# Patient Record
Sex: FEMALE | Race: WHITE | NOT HISPANIC OR LATINO | Employment: OTHER | URBAN - NONMETROPOLITAN AREA
[De-identification: names, ages, dates, MRNs, and addresses within clinical notes are randomized per-mention and may not be internally consistent; named-entity substitution may affect disease eponyms.]

---

## 2023-02-01 PROBLEM — M25.552 PAIN IN LEFT HIP: Status: ACTIVE | Noted: 2023-02-01

## 2023-02-01 PROBLEM — I25.10 CAD (CORONARY ARTERY DISEASE): Status: ACTIVE | Noted: 2023-02-01

## 2023-02-01 PROBLEM — I49.5 SINUS NODE DYSFUNCTION (MULTI): Status: ACTIVE | Noted: 2023-02-01

## 2023-02-01 PROBLEM — R47.9 SPEECH DEFECT: Status: ACTIVE | Noted: 2023-02-01

## 2023-02-01 PROBLEM — I35.1 SEVERE AORTIC REGURGITATION: Status: ACTIVE | Noted: 2023-02-01

## 2023-02-01 PROBLEM — M54.50 CHRONIC LOW BACK PAIN: Status: ACTIVE | Noted: 2023-02-01

## 2023-02-01 PROBLEM — R09.81 NASAL CONGESTION: Status: ACTIVE | Noted: 2023-02-01

## 2023-02-01 PROBLEM — N18.30 STAGE 3 CHRONIC KIDNEY DISEASE (MULTI): Status: ACTIVE | Noted: 2023-02-01

## 2023-02-01 PROBLEM — R35.1 NOCTURIA: Status: ACTIVE | Noted: 2023-02-01

## 2023-02-01 PROBLEM — E55.9 MILD VITAMIN D DEFICIENCY: Status: ACTIVE | Noted: 2023-02-01

## 2023-02-01 PROBLEM — M51.36 LUMBAR DEGENERATIVE DISC DISEASE: Status: ACTIVE | Noted: 2023-02-01

## 2023-02-01 PROBLEM — J98.4 RESTRICTIVE LUNG DISEASE: Status: ACTIVE | Noted: 2023-02-01

## 2023-02-01 PROBLEM — E87.6 HYPOKALEMIA: Status: ACTIVE | Noted: 2023-02-01

## 2023-02-01 PROBLEM — I35.2 AORTIC INSUFFICIENCY WITH AORTIC STENOSIS: Status: ACTIVE | Noted: 2023-02-01

## 2023-02-01 PROBLEM — R39.9 URINARY SYMPTOM OR SIGN: Status: ACTIVE | Noted: 2023-02-01

## 2023-02-01 PROBLEM — F51.04 CHRONIC INSOMNIA: Status: ACTIVE | Noted: 2023-02-01

## 2023-02-01 PROBLEM — R29.6 MULTIPLE FALLS: Status: ACTIVE | Noted: 2023-02-01

## 2023-02-01 PROBLEM — I10 BENIGN ESSENTIAL HTN: Status: ACTIVE | Noted: 2023-02-01

## 2023-02-01 PROBLEM — R60.0 LEG EDEMA, LEFT: Status: ACTIVE | Noted: 2023-02-01

## 2023-02-01 PROBLEM — M16.12 PRIMARY OSTEOARTHRITIS OF LEFT HIP: Status: ACTIVE | Noted: 2023-02-01

## 2023-02-01 PROBLEM — J45.909 ASTHMA (HHS-HCC): Status: ACTIVE | Noted: 2023-02-01

## 2023-02-01 PROBLEM — M81.0 OSTEOPOROSIS: Status: ACTIVE | Noted: 2023-02-01

## 2023-02-01 PROBLEM — I50.42 CHRONIC COMBINED SYSTOLIC AND DIASTOLIC CONGESTIVE HEART FAILURE (MULTI): Status: ACTIVE | Noted: 2023-02-01

## 2023-02-01 PROBLEM — J32.9 CHRONIC SINUSITIS: Status: ACTIVE | Noted: 2023-02-01

## 2023-02-01 PROBLEM — D69.6 THROMBOCYTOPENIA (CMS-HCC): Status: ACTIVE | Noted: 2023-02-01

## 2023-02-01 PROBLEM — J30.9 ALLERGIC RHINITIS: Status: ACTIVE | Noted: 2023-02-01

## 2023-02-01 PROBLEM — J96.11 CHRONIC RESPIRATORY FAILURE WITH HYPOXIA (MULTI): Status: ACTIVE | Noted: 2023-02-01

## 2023-02-01 PROBLEM — R19.7 BLOODY DIARRHEA: Status: ACTIVE | Noted: 2023-02-01

## 2023-02-01 PROBLEM — M54.9 UPPER BACK PAIN: Status: ACTIVE | Noted: 2023-02-01

## 2023-02-01 PROBLEM — R05.9 COUGH: Status: ACTIVE | Noted: 2023-02-01

## 2023-02-01 PROBLEM — R53.1 WEAKNESS: Status: ACTIVE | Noted: 2023-02-01

## 2023-02-01 PROBLEM — R51.9 HEADACHE: Status: ACTIVE | Noted: 2023-02-01

## 2023-02-01 PROBLEM — K59.00 CONSTIPATION: Status: ACTIVE | Noted: 2023-02-01

## 2023-02-01 PROBLEM — R91.1 LUNG NODULE: Status: ACTIVE | Noted: 2023-02-01

## 2023-02-01 PROBLEM — G89.29 CHRONIC LOW BACK PAIN: Status: ACTIVE | Noted: 2023-02-01

## 2023-02-01 PROBLEM — G62.9 NEUROPATHY: Status: ACTIVE | Noted: 2023-02-01

## 2023-02-01 PROBLEM — M87.051 AVASCULAR NECROSIS OF RIGHT FEMORAL HEAD (MULTI): Status: ACTIVE | Noted: 2023-02-01

## 2023-02-01 PROBLEM — R06.02 SHORTNESS OF BREATH: Status: ACTIVE | Noted: 2023-02-01

## 2023-02-01 PROBLEM — Z95.0 S/P CARDIAC PACEMAKER PROCEDURE: Status: ACTIVE | Noted: 2023-02-01

## 2023-02-01 PROBLEM — N39.0 ACUTE UTI: Status: ACTIVE | Noted: 2023-02-01

## 2023-02-01 PROBLEM — M25.551 RIGHT HIP PAIN: Status: ACTIVE | Noted: 2023-02-01

## 2023-02-01 PROBLEM — Z95.828 H/O ASCENDING AORTIC REPLACEMENT: Status: ACTIVE | Noted: 2023-02-01

## 2023-02-01 PROBLEM — Z95.5 HISTORY OF CORONARY ARTERY STENT PLACEMENT: Status: ACTIVE | Noted: 2023-02-01

## 2023-02-01 PROBLEM — E78.5 DYSLIPIDEMIA: Status: ACTIVE | Noted: 2023-02-01

## 2023-02-01 PROBLEM — M43.17 SPONDYLOLISTHESIS OF LUMBOSACRAL REGION: Status: ACTIVE | Noted: 2023-02-01

## 2023-02-01 PROBLEM — D50.9 IRON DEFICIENCY ANEMIA: Status: ACTIVE | Noted: 2023-02-01

## 2023-02-01 PROBLEM — S82.209A TIBIA FRACTURE: Status: ACTIVE | Noted: 2023-02-01

## 2023-02-01 PROBLEM — R05.3 CHRONIC COUGH: Status: ACTIVE | Noted: 2023-02-01

## 2023-02-01 PROBLEM — I35.9 AORTIC VALVE DISORDER: Status: ACTIVE | Noted: 2023-02-01

## 2023-02-01 PROBLEM — K27.9 PUD (PEPTIC ULCER DISEASE): Status: ACTIVE | Noted: 2023-02-01

## 2023-02-01 PROBLEM — R06.2 WHEEZING: Status: ACTIVE | Noted: 2023-02-01

## 2023-02-01 PROBLEM — Z95.3 S/P AORTIC VALVE REPLACEMENT WITH BIOPROSTHETIC VALVE: Status: ACTIVE | Noted: 2023-02-01

## 2023-02-01 PROBLEM — E03.9 HYPOTHYROID: Status: ACTIVE | Noted: 2023-02-01

## 2023-02-01 PROBLEM — Z95.4 STATUS POST COMBINED AORTIC ROOT AND VALVE REPLACEMENT USING STENTLESS BIOPROSTHETIC AORTIC VALVE: Status: ACTIVE | Noted: 2023-02-01

## 2023-02-01 PROBLEM — M16.11 ARTHROPATHY OF RIGHT HIP: Status: ACTIVE | Noted: 2023-02-01

## 2023-02-01 PROBLEM — E87.8 ELECTROLYTE AND FLUID DISORDER: Status: ACTIVE | Noted: 2023-02-01

## 2023-02-01 PROBLEM — L03.90 CELLULITIS: Status: ACTIVE | Noted: 2023-02-01

## 2023-02-01 PROBLEM — M51.369 LUMBAR DEGENERATIVE DISC DISEASE: Status: ACTIVE | Noted: 2023-02-01

## 2023-02-01 PROBLEM — R68.89 ACTIVITY INTOLERANCE: Status: ACTIVE | Noted: 2023-02-01

## 2023-02-01 PROBLEM — M79.89 SWELLING OF LOWER EXTREMITY: Status: ACTIVE | Noted: 2023-02-01

## 2023-02-01 PROBLEM — Z95.2 HISTORY OF AORTIC VALVE REPLACEMENT: Status: ACTIVE | Noted: 2023-02-01

## 2023-02-01 PROBLEM — D64.9 ANEMIA: Status: ACTIVE | Noted: 2023-02-01

## 2023-02-01 PROBLEM — M48.061 LUMBAR CANAL STENOSIS: Status: ACTIVE | Noted: 2023-02-01

## 2023-02-01 PROBLEM — R53.83 FATIGUE: Status: ACTIVE | Noted: 2023-02-01

## 2023-02-01 PROBLEM — K20.90 ESOPHAGITIS: Status: ACTIVE | Noted: 2023-02-01

## 2023-02-01 PROBLEM — R31.9 HEMATURIA: Status: ACTIVE | Noted: 2023-02-01

## 2023-02-01 PROBLEM — I48.91 ATRIAL FIBRILLATION (MULTI): Status: ACTIVE | Noted: 2023-02-01

## 2023-02-01 PROBLEM — J84.9 ILD (INTERSTITIAL LUNG DISEASE) (MULTI): Status: ACTIVE | Noted: 2023-02-01

## 2023-02-01 PROBLEM — E83.42 HYPOMAGNESEMIA: Status: ACTIVE | Noted: 2023-02-01

## 2023-02-01 PROBLEM — M47.816 FACET DEGENERATION OF LUMBAR REGION: Status: ACTIVE | Noted: 2023-02-01

## 2023-02-01 PROBLEM — N63.0 BREAST LUMP: Status: ACTIVE | Noted: 2023-02-01

## 2023-02-01 PROBLEM — R23.3 EASY BRUISING: Status: ACTIVE | Noted: 2023-02-01

## 2023-02-01 RX ORDER — METOPROLOL SUCCINATE 25 MG/1
25 TABLET, EXTENDED RELEASE ORAL NIGHTLY
COMMUNITY
Start: 2020-05-21 | End: 2024-06-03

## 2023-02-01 RX ORDER — ALLOPURINOL 100 MG/1
100 TABLET ORAL DAILY
Status: ON HOLD | COMMUNITY
Start: 2022-12-19 | End: 2023-10-09 | Stop reason: ALTCHOICE

## 2023-02-01 RX ORDER — CLOPIDOGREL BISULFATE 75 MG/1
75 TABLET ORAL DAILY
COMMUNITY
Start: 2022-08-14 | End: 2023-10-31

## 2023-02-01 RX ORDER — LEVOTHYROXINE SODIUM 88 UG/1
88 TABLET ORAL
Status: ON HOLD | COMMUNITY
Start: 2021-07-26 | End: 2023-10-09 | Stop reason: ALTCHOICE

## 2023-02-01 RX ORDER — GABAPENTIN 300 MG/1
300 CAPSULE ORAL 2 TIMES DAILY
COMMUNITY
Start: 2022-08-14 | End: 2023-10-03

## 2023-02-01 RX ORDER — DOCUSATE SODIUM 100 MG/1
100 CAPSULE, LIQUID FILLED ORAL 2 TIMES DAILY PRN
Status: ON HOLD | COMMUNITY
Start: 2021-11-01 | End: 2023-10-09 | Stop reason: ALTCHOICE

## 2023-02-01 RX ORDER — LANOLIN ALCOHOL/MO/W.PET/CERES
400 CREAM (GRAM) TOPICAL DAILY
COMMUNITY
Start: 2022-12-12 | End: 2023-07-13

## 2023-02-01 RX ORDER — FERROUS SULFATE 325(65) MG
65 TABLET ORAL 2 TIMES DAILY
COMMUNITY
Start: 2022-05-17 | End: 2023-06-07

## 2023-02-01 RX ORDER — VALSARTAN 40 MG/1
40 TABLET ORAL DAILY
COMMUNITY
Start: 2022-06-14 | End: 2023-10-16

## 2023-02-01 RX ORDER — TORSEMIDE 20 MG/1
60 TABLET ORAL DAILY
Status: ON HOLD | COMMUNITY
Start: 2019-06-19 | End: 2023-10-09 | Stop reason: ALTCHOICE

## 2023-02-01 RX ORDER — ACETAMINOPHEN 500 MG
5 TABLET ORAL NIGHTLY
Status: ON HOLD | COMMUNITY
End: 2023-10-09 | Stop reason: ALTCHOICE

## 2023-02-01 RX ORDER — FLUTICASONE PROPIONATE 50 MCG
1 SPRAY, SUSPENSION (ML) NASAL 2 TIMES DAILY
Status: ON HOLD | COMMUNITY
End: 2023-10-09 | Stop reason: ALTCHOICE

## 2023-02-01 RX ORDER — PANTOPRAZOLE SODIUM 40 MG/1
40 TABLET, DELAYED RELEASE ORAL 2 TIMES DAILY
COMMUNITY
Start: 2022-05-17 | End: 2023-04-17

## 2023-02-01 RX ORDER — BISACODYL 5 MG
10 TABLET, DELAYED RELEASE (ENTERIC COATED) ORAL
Status: ON HOLD | COMMUNITY
Start: 2022-05-23 | End: 2023-10-09 | Stop reason: ALTCHOICE

## 2023-02-01 RX ORDER — DAPAGLIFLOZIN 10 MG/1
10 TABLET, FILM COATED ORAL DAILY
COMMUNITY
End: 2023-10-26 | Stop reason: ALTCHOICE

## 2023-02-01 RX ORDER — LIDOCAINE 50 MG/G
1 PATCH TOPICAL
Status: ON HOLD | COMMUNITY
Start: 2021-08-28 | End: 2023-10-09 | Stop reason: ALTCHOICE

## 2023-02-01 RX ORDER — SPIRONOLACTONE 25 MG/1
25 TABLET ORAL DAILY
Status: ON HOLD | COMMUNITY
Start: 2017-11-21 | End: 2023-10-09 | Stop reason: ALTCHOICE

## 2023-03-21 DIAGNOSIS — I48.91 ATRIAL FIBRILLATION, UNSPECIFIED TYPE (MULTI): Primary | ICD-10-CM

## 2023-03-22 RX ORDER — APIXABAN 2.5 MG/1
TABLET, FILM COATED ORAL
Qty: 60 TABLET | Refills: 6 | Status: SHIPPED | OUTPATIENT
Start: 2023-03-22 | End: 2023-11-13

## 2023-04-06 LAB
ALBUMIN (G/DL) IN SER/PLAS: 4.4 G/DL (ref 3.4–5)
ANION GAP IN SER/PLAS: 16 MMOL/L (ref 10–20)
CALCIUM (MG/DL) IN SER/PLAS: 9.7 MG/DL (ref 8.6–10.3)
CARBON DIOXIDE, TOTAL (MMOL/L) IN SER/PLAS: 26 MMOL/L (ref 21–32)
CHLORIDE (MMOL/L) IN SER/PLAS: 100 MMOL/L (ref 98–107)
CREATININE (MG/DL) IN SER/PLAS: 1.26 MG/DL (ref 0.5–1.05)
ERYTHROCYTE DISTRIBUTION WIDTH (RATIO) BY AUTOMATED COUNT: 14.5 % (ref 11.5–14.5)
ERYTHROCYTE MEAN CORPUSCULAR HEMOGLOBIN CONCENTRATION (G/DL) BY AUTOMATED: 33.6 G/DL (ref 32–36)
ERYTHROCYTE MEAN CORPUSCULAR VOLUME (FL) BY AUTOMATED COUNT: 94 FL (ref 80–100)
ERYTHROCYTES (10*6/UL) IN BLOOD BY AUTOMATED COUNT: 4.5 X10E12/L (ref 4–5.2)
GFR FEMALE: 43 ML/MIN/1.73M2
GLUCOSE (MG/DL) IN SER/PLAS: 72 MG/DL (ref 74–99)
HEMATOCRIT (%) IN BLOOD BY AUTOMATED COUNT: 42.5 % (ref 36–46)
HEMOGLOBIN (G/DL) IN BLOOD: 14.3 G/DL (ref 12–16)
LEUKOCYTES (10*3/UL) IN BLOOD BY AUTOMATED COUNT: 7.1 X10E9/L (ref 4.4–11.3)
PHOSPHATE (MG/DL) IN SER/PLAS: 3.6 MG/DL (ref 2.5–4.9)
PLATELETS (10*3/UL) IN BLOOD AUTOMATED COUNT: 164 X10E9/L (ref 150–450)
POTASSIUM (MMOL/L) IN SER/PLAS: 4.6 MMOL/L (ref 3.5–5.3)
SODIUM (MMOL/L) IN SER/PLAS: 137 MMOL/L (ref 136–145)
URATE (MG/DL) IN SER/PLAS: 6.1 MG/DL (ref 2.3–6.7)
UREA NITROGEN (MG/DL) IN SER/PLAS: 34 MG/DL (ref 6–23)

## 2023-04-16 DIAGNOSIS — K20.90 ESOPHAGITIS: Primary | ICD-10-CM

## 2023-04-17 RX ORDER — PANTOPRAZOLE SODIUM 40 MG/1
TABLET, DELAYED RELEASE ORAL
Qty: 180 TABLET | Refills: 1 | Status: SHIPPED | OUTPATIENT
Start: 2023-04-17 | End: 2023-10-24

## 2023-05-03 ENCOUNTER — TELEPHONE (OUTPATIENT)
Dept: PRIMARY CARE | Facility: CLINIC | Age: 83
End: 2023-05-03
Payer: MEDICARE

## 2023-05-03 NOTE — TELEPHONE ENCOUNTER
Son called stating that the passed couple of days, Shaina has been passing blood in her urine. She said that she does not want to go to the ER. Appt? Culture?

## 2023-05-04 ENCOUNTER — LAB (OUTPATIENT)
Dept: LAB | Facility: LAB | Age: 83
End: 2023-05-04
Payer: MEDICARE

## 2023-05-04 ENCOUNTER — OFFICE VISIT (OUTPATIENT)
Dept: PRIMARY CARE | Facility: CLINIC | Age: 83
End: 2023-05-04
Payer: MEDICARE

## 2023-05-04 VITALS
WEIGHT: 130.4 LBS | TEMPERATURE: 98.6 F | OXYGEN SATURATION: 94 % | SYSTOLIC BLOOD PRESSURE: 104 MMHG | HEIGHT: 60 IN | DIASTOLIC BLOOD PRESSURE: 62 MMHG | HEART RATE: 82 BPM | BODY MASS INDEX: 25.6 KG/M2

## 2023-05-04 DIAGNOSIS — I48.20 CHRONIC ATRIAL FIBRILLATION, UNSPECIFIED (MULTI): ICD-10-CM

## 2023-05-04 DIAGNOSIS — N39.0 ACUTE UTI: ICD-10-CM

## 2023-05-04 DIAGNOSIS — I50.42 CHRONIC COMBINED SYSTOLIC (CONGESTIVE) AND DIASTOLIC (CONGESTIVE) HEART FAILURE (MULTI): ICD-10-CM

## 2023-05-04 DIAGNOSIS — N18.30 STAGE 3 CHRONIC KIDNEY DISEASE, UNSPECIFIED WHETHER STAGE 3A OR 3B CKD (MULTI): ICD-10-CM

## 2023-05-04 DIAGNOSIS — D69.6 THROMBOCYTOPENIA (CMS-HCC): ICD-10-CM

## 2023-05-04 DIAGNOSIS — J96.11 CHRONIC RESPIRATORY FAILURE WITH HYPOXIA (MULTI): ICD-10-CM

## 2023-05-04 DIAGNOSIS — Z00.00 MEDICARE ANNUAL WELLNESS VISIT, SUBSEQUENT: Primary | ICD-10-CM

## 2023-05-04 DIAGNOSIS — R31.9 HEMATURIA, UNSPECIFIED TYPE: ICD-10-CM

## 2023-05-04 DIAGNOSIS — J84.10 PULMONARY FIBROSIS, UNSPECIFIED (MULTI): ICD-10-CM

## 2023-05-04 PROBLEM — I35.1 SEVERE AORTIC REGURGITATION: Status: RESOLVED | Noted: 2023-02-01 | Resolved: 2023-05-04

## 2023-05-04 PROBLEM — E87.8 ELECTROLYTE AND FLUID DISORDER: Status: RESOLVED | Noted: 2023-02-01 | Resolved: 2023-05-04

## 2023-05-04 PROBLEM — M54.9 UPPER BACK PAIN: Status: RESOLVED | Noted: 2023-02-01 | Resolved: 2023-05-04

## 2023-05-04 PROBLEM — Z95.828 H/O ASCENDING AORTIC REPLACEMENT: Status: RESOLVED | Noted: 2023-02-01 | Resolved: 2023-05-04

## 2023-05-04 PROBLEM — R51.9 HEADACHE: Status: RESOLVED | Noted: 2023-02-01 | Resolved: 2023-05-04

## 2023-05-04 PROBLEM — M25.552 PAIN IN LEFT HIP: Status: RESOLVED | Noted: 2023-02-01 | Resolved: 2023-05-04

## 2023-05-04 PROBLEM — M25.551 RIGHT HIP PAIN: Status: RESOLVED | Noted: 2023-02-01 | Resolved: 2023-05-04

## 2023-05-04 PROBLEM — R05.9 COUGH: Status: RESOLVED | Noted: 2023-02-01 | Resolved: 2023-05-04

## 2023-05-04 PROBLEM — R53.83 FATIGUE: Status: RESOLVED | Noted: 2023-02-01 | Resolved: 2023-05-04

## 2023-05-04 PROBLEM — M87.051 AVASCULAR NECROSIS OF RIGHT FEMORAL HEAD (MULTI): Status: RESOLVED | Noted: 2023-02-01 | Resolved: 2023-05-04

## 2023-05-04 PROBLEM — R47.9 SPEECH DEFECT: Status: RESOLVED | Noted: 2023-02-01 | Resolved: 2023-05-04

## 2023-05-04 PROBLEM — R60.0 LEG EDEMA, LEFT: Status: RESOLVED | Noted: 2023-02-01 | Resolved: 2023-05-04

## 2023-05-04 PROBLEM — I49.5 SINUS NODE DYSFUNCTION (MULTI): Status: RESOLVED | Noted: 2023-02-01 | Resolved: 2023-05-04

## 2023-05-04 PROBLEM — S82.209A TIBIA FRACTURE: Status: RESOLVED | Noted: 2023-02-01 | Resolved: 2023-05-04

## 2023-05-04 PROBLEM — E87.6 HYPOKALEMIA: Status: RESOLVED | Noted: 2023-02-01 | Resolved: 2023-05-04

## 2023-05-04 PROBLEM — R19.7 BLOODY DIARRHEA: Status: RESOLVED | Noted: 2023-02-01 | Resolved: 2023-05-04

## 2023-05-04 PROBLEM — R06.02 SHORTNESS OF BREATH: Status: RESOLVED | Noted: 2023-02-01 | Resolved: 2023-05-04

## 2023-05-04 PROBLEM — R09.81 NASAL CONGESTION: Status: RESOLVED | Noted: 2023-02-01 | Resolved: 2023-05-04

## 2023-05-04 PROBLEM — Z95.4 STATUS POST COMBINED AORTIC ROOT AND VALVE REPLACEMENT USING STENTLESS BIOPROSTHETIC AORTIC VALVE: Status: RESOLVED | Noted: 2023-02-01 | Resolved: 2023-05-04

## 2023-05-04 PROBLEM — Z95.2 STATUS POST COMBINED AORTIC ROOT AND VALVE REPLACEMENT USING STENTLESS BIOPROSTHETIC AORTIC VALVE: Status: RESOLVED | Noted: 2023-02-01 | Resolved: 2023-05-04

## 2023-05-04 PROBLEM — L03.90 CELLULITIS: Status: RESOLVED | Noted: 2023-02-01 | Resolved: 2023-05-04

## 2023-05-04 PROBLEM — R68.89 ACTIVITY INTOLERANCE: Status: RESOLVED | Noted: 2023-02-01 | Resolved: 2023-05-04

## 2023-05-04 PROBLEM — Z95.0 S/P CARDIAC PACEMAKER PROCEDURE: Status: RESOLVED | Noted: 2023-02-01 | Resolved: 2023-05-04

## 2023-05-04 PROBLEM — R53.1 WEAKNESS: Status: RESOLVED | Noted: 2023-02-01 | Resolved: 2023-05-04

## 2023-05-04 PROBLEM — R39.9 URINARY SYMPTOM OR SIGN: Status: RESOLVED | Noted: 2023-02-01 | Resolved: 2023-05-04

## 2023-05-04 PROBLEM — R06.2 WHEEZING: Status: RESOLVED | Noted: 2023-02-01 | Resolved: 2023-05-04

## 2023-05-04 PROBLEM — M79.89 SWELLING OF LOWER EXTREMITY: Status: RESOLVED | Noted: 2023-02-01 | Resolved: 2023-05-04

## 2023-05-04 PROBLEM — R35.1 NOCTURIA: Status: RESOLVED | Noted: 2023-02-01 | Resolved: 2023-05-04

## 2023-05-04 PROBLEM — R23.3 EASY BRUISING: Status: RESOLVED | Noted: 2023-02-01 | Resolved: 2023-05-04

## 2023-05-04 PROBLEM — K59.00 CONSTIPATION: Status: RESOLVED | Noted: 2023-02-01 | Resolved: 2023-05-04

## 2023-05-04 PROBLEM — N63.0 BREAST LUMP: Status: RESOLVED | Noted: 2023-02-01 | Resolved: 2023-05-04

## 2023-05-04 PROBLEM — R05.3 CHRONIC COUGH: Status: RESOLVED | Noted: 2023-02-01 | Resolved: 2023-05-04

## 2023-05-04 PROBLEM — D64.9 ANEMIA: Status: RESOLVED | Noted: 2023-02-01 | Resolved: 2023-05-04

## 2023-05-04 PROBLEM — Z95.5 S/P CORONARY ARTERY STENT PLACEMENT: Status: RESOLVED | Noted: 2023-02-01 | Resolved: 2023-05-04

## 2023-05-04 PROBLEM — E83.42 HYPOMAGNESEMIA: Status: RESOLVED | Noted: 2023-02-01 | Resolved: 2023-05-04

## 2023-05-04 LAB
ERYTHROCYTE DISTRIBUTION WIDTH (RATIO) BY AUTOMATED COUNT: 14.1 % (ref 11.5–14.5)
ERYTHROCYTE MEAN CORPUSCULAR HEMOGLOBIN CONCENTRATION (G/DL) BY AUTOMATED: 31.4 G/DL (ref 32–36)
ERYTHROCYTE MEAN CORPUSCULAR VOLUME (FL) BY AUTOMATED COUNT: 96 FL (ref 80–100)
ERYTHROCYTES (10*6/UL) IN BLOOD BY AUTOMATED COUNT: 4.07 X10E12/L (ref 4–5.2)
HEMATOCRIT (%) IN BLOOD BY AUTOMATED COUNT: 39.2 % (ref 36–46)
HEMOGLOBIN (G/DL) IN BLOOD: 12.3 G/DL (ref 12–16)
LEUKOCYTES (10*3/UL) IN BLOOD BY AUTOMATED COUNT: 5.2 X10E9/L (ref 4.4–11.3)
PLATELETS (10*3/UL) IN BLOOD AUTOMATED COUNT: 156 X10E9/L (ref 150–450)
POC APPEARANCE, URINE: ABNORMAL
POC BILIRUBIN, URINE: ABNORMAL
POC BLOOD, URINE: ABNORMAL
POC COLOR, URINE: ABNORMAL
POC GLUCOSE, URINE: NEGATIVE MG/DL
POC KETONES, URINE: ABNORMAL MG/DL
POC LEUKOCYTES, URINE: ABNORMAL
POC NITRITE,URINE: POSITIVE
POC PH, URINE: 6 PH
POC PROTEIN, URINE: ABNORMAL MG/DL
POC SPECIFIC GRAVITY, URINE: 1.02
POC UROBILINOGEN, URINE: 1 EU/DL

## 2023-05-04 PROCEDURE — 1160F RVW MEDS BY RX/DR IN RCRD: CPT | Performed by: FAMILY MEDICINE

## 2023-05-04 PROCEDURE — 81003 URINALYSIS AUTO W/O SCOPE: CPT | Performed by: FAMILY MEDICINE

## 2023-05-04 PROCEDURE — 1159F MED LIST DOCD IN RCRD: CPT | Performed by: FAMILY MEDICINE

## 2023-05-04 PROCEDURE — 1157F ADVNC CARE PLAN IN RCRD: CPT | Performed by: FAMILY MEDICINE

## 2023-05-04 PROCEDURE — 3078F DIAST BP <80 MM HG: CPT | Performed by: FAMILY MEDICINE

## 2023-05-04 PROCEDURE — 87077 CULTURE AEROBIC IDENTIFY: CPT

## 2023-05-04 PROCEDURE — 3074F SYST BP LT 130 MM HG: CPT | Performed by: FAMILY MEDICINE

## 2023-05-04 PROCEDURE — 1036F TOBACCO NON-USER: CPT | Performed by: FAMILY MEDICINE

## 2023-05-04 PROCEDURE — 1170F FXNL STATUS ASSESSED: CPT | Performed by: FAMILY MEDICINE

## 2023-05-04 PROCEDURE — 36415 COLL VENOUS BLD VENIPUNCTURE: CPT

## 2023-05-04 PROCEDURE — 85027 COMPLETE CBC AUTOMATED: CPT

## 2023-05-04 PROCEDURE — G0439 PPPS, SUBSEQ VISIT: HCPCS | Performed by: FAMILY MEDICINE

## 2023-05-04 PROCEDURE — 87186 SC STD MICRODIL/AGAR DIL: CPT

## 2023-05-04 PROCEDURE — 87086 URINE CULTURE/COLONY COUNT: CPT

## 2023-05-04 RX ORDER — CEPHALEXIN 500 MG/1
500 CAPSULE ORAL 2 TIMES DAILY
Qty: 14 CAPSULE | Refills: 0 | Status: SHIPPED | OUTPATIENT
Start: 2023-05-04 | End: 2023-05-11

## 2023-05-04 ASSESSMENT — PATIENT HEALTH QUESTIONNAIRE - PHQ9
SUM OF ALL RESPONSES TO PHQ9 QUESTIONS 1 AND 2: 0
1. LITTLE INTEREST OR PLEASURE IN DOING THINGS: NOT AT ALL
2. FEELING DOWN, DEPRESSED OR HOPELESS: NOT AT ALL

## 2023-05-04 ASSESSMENT — ACTIVITIES OF DAILY LIVING (ADL)
BATHING: INDEPENDENT
GROCERY_SHOPPING: INDEPENDENT
DRESSING: INDEPENDENT
DOING_HOUSEWORK: INDEPENDENT
TAKING_MEDICATION: INDEPENDENT
MANAGING_FINANCES: INDEPENDENT

## 2023-05-04 NOTE — PROGRESS NOTES
Subjective   Reason for Visit: Shaina Iverson is an 82 y.o. female here for a Medicare Wellness visit.     Past Medical, Surgical, and Family History reviewed and updated in chart.    Reviewed all medications by prescribing practitioner or clinical pharmacist (such as prescriptions, OTCs, herbal therapies and supplements) and documented in the medical record.    HPI    Here for medicare annual physical, also has been having hematuria x 3 days, along with clots. No dysuria or urinary urgency. No pelvic pain. Does have spotting on her underwear. S/p hysterectomy  Is on eliquis.  Has CHF and A-fib, following with cardiology, denies any chest pain, sob.  Has stage 3 CKD, following with renal, has ultrasound kidney scheduled  Had thrombocytopenia on recent blood work  Following with pulmonology for pulmonary fibrosis, on 2LO2 with exertion    Patient Care Team:  Steven Hdz MD as PCP - General  Steven Hdz MD as PCP - [employer] PCP     Review of Systems  General: no fever  Eyes: no blurry vision  ENT: no sore throat, no ear pain  Resp: no cough, sob or wheezing  Cardio: no chest pain, no palpitations  Abd: no nausea/vomiting  : see HPI      Objective   Vitals:  /62   Pulse 82   Temp 37 °C (98.6 °F)   Ht 1.524 m (5')   Wt 59.1 kg (130 lb 6.4 oz)   SpO2 94%   BMI 25.47 kg/m²       Physical Exam  Gen: NAD, alert  Head: normocephalic/atraumatic  Eyes: conjunctivae normal  Ears: canals clear bilaterally, TM normal   Nose: Deferred due to covid precautions, wearing mask  Oropharynx: Deferred due to covid precautions, wearing mask  Resp: Clear to auscultation  CVS: Regular rate and rhythm  Abdomen: soft, NT, ND  Ext: no edema, NT of lower extremities      Assessment/Plan   Problem List Items Addressed This Visit       Acute UTI    Relevant Medications    cephalexin (Keflex) 500 mg capsule    Chronic respiratory failure with hypoxia (CMS/HCC)    Hematuria    Relevant  Orders    POCT UA Automated manually resulted (Completed)    CBC (Completed)    Referral to Urology    Urine Culture (Completed)    US bladder    Stage 3 chronic kidney disease (CMS/HCC)    Thrombocytopenia (CMS/HCC)  Check CBC     Other Visit Diagnoses       Medicare annual wellness visit, subsequent    -  Primary    Chronic combined systolic (congestive) and diastolic (congestive) heart failure (CMS/HCC)        Chronic atrial fibrillation, unspecified (CMS/HCC)      Continue follow up with cardiology    Pulmonary fibrosis, unspecified (CMS/HCC)      Continue follow up with pulmonology

## 2023-05-07 LAB — URINE CULTURE: ABNORMAL

## 2023-06-05 LAB
BACTERIA, URINE: ABNORMAL /HPF
RBC, URINE: ABNORMAL /HPF (ref 0–5)
WBC CLUMPS, URINE: ABNORMAL /HPF
WBC, URINE: 47 /HPF (ref 0–5)

## 2023-06-06 ENCOUNTER — TELEPHONE (OUTPATIENT)
Dept: PRIMARY CARE | Facility: CLINIC | Age: 83
End: 2023-06-06
Payer: MEDICARE

## 2023-06-06 DIAGNOSIS — D50.0 IRON DEFICIENCY ANEMIA DUE TO CHRONIC BLOOD LOSS: ICD-10-CM

## 2023-06-06 NOTE — TELEPHONE ENCOUNTER
Went to see Dr. Miner, urology.  He wants her to stop Plavix Friday and stop Eliquis on Tuesday for procedure to remove cyst on bladder on the 16th.  Yi Pugh is out of the office, will you please advise on how to proceed? Javi [phone]

## 2023-06-07 RX ORDER — FERROUS SULFATE 325(65) MG
1 TABLET ORAL 2 TIMES DAILY
Qty: 180 TABLET | Refills: 1 | Status: SHIPPED | OUTPATIENT
Start: 2023-06-07 | End: 2024-02-06

## 2023-06-09 LAB — URINE CULTURE: ABNORMAL

## 2023-06-14 LAB
ACTIVATED PARTIAL THROMBOPLASTIN TIME IN PPP BY COAGULATION ASSAY: 33 SEC (ref 26–39)
ANION GAP IN SER/PLAS: 13 MMOL/L (ref 10–20)
APPEARANCE, URINE: ABNORMAL
BACTERIA, URINE: ABNORMAL /HPF
BASOPHILS (10*3/UL) IN BLOOD BY AUTOMATED COUNT: 0.03 X10E9/L (ref 0–0.1)
BASOPHILS/100 LEUKOCYTES IN BLOOD BY AUTOMATED COUNT: 0.6 % (ref 0–2)
BILIRUBIN, URINE: NEGATIVE
BLOOD, URINE: ABNORMAL
CALCIUM (MG/DL) IN SER/PLAS: 9.8 MG/DL (ref 8.6–10.3)
CARBON DIOXIDE, TOTAL (MMOL/L) IN SER/PLAS: 27 MMOL/L (ref 21–32)
CHLORIDE (MMOL/L) IN SER/PLAS: 102 MMOL/L (ref 98–107)
COLOR, URINE: YELLOW
CREATININE (MG/DL) IN SER/PLAS: 1.12 MG/DL (ref 0.5–1.05)
EOSINOPHILS (10*3/UL) IN BLOOD BY AUTOMATED COUNT: 0.27 X10E9/L (ref 0–0.4)
EOSINOPHILS/100 LEUKOCYTES IN BLOOD BY AUTOMATED COUNT: 5.3 % (ref 0–6)
ERYTHROCYTE DISTRIBUTION WIDTH (RATIO) BY AUTOMATED COUNT: 12.7 % (ref 11.5–14.5)
ERYTHROCYTE MEAN CORPUSCULAR HEMOGLOBIN CONCENTRATION (G/DL) BY AUTOMATED: 31.3 G/DL (ref 32–36)
ERYTHROCYTE MEAN CORPUSCULAR VOLUME (FL) BY AUTOMATED COUNT: 99 FL (ref 80–100)
ERYTHROCYTES (10*6/UL) IN BLOOD BY AUTOMATED COUNT: 4.36 X10E12/L (ref 4–5.2)
GFR FEMALE: 50 ML/MIN/1.73M2
GLUCOSE (MG/DL) IN SER/PLAS: 81 MG/DL (ref 74–99)
GLUCOSE, URINE: NEGATIVE MG/DL
HEMATOCRIT (%) IN BLOOD BY AUTOMATED COUNT: 43.2 % (ref 36–46)
HEMOGLOBIN (G/DL) IN BLOOD: 13.5 G/DL (ref 12–16)
IMMATURE GRANULOCYTES/100 LEUKOCYTES IN BLOOD BY AUTOMATED COUNT: 0.4 % (ref 0–0.9)
INR IN PPP BY COAGULATION ASSAY: 1.2 (ref 0.9–1.1)
KETONES, URINE: NEGATIVE MG/DL
LEUKOCYTE ESTERASE, URINE: ABNORMAL
LEUKOCYTES (10*3/UL) IN BLOOD BY AUTOMATED COUNT: 5.1 X10E9/L (ref 4.4–11.3)
LYMPHOCYTES (10*3/UL) IN BLOOD BY AUTOMATED COUNT: 1.23 X10E9/L (ref 0.8–3)
LYMPHOCYTES/100 LEUKOCYTES IN BLOOD BY AUTOMATED COUNT: 24 % (ref 13–44)
MONOCYTES (10*3/UL) IN BLOOD BY AUTOMATED COUNT: 0.49 X10E9/L (ref 0.05–0.8)
MONOCYTES/100 LEUKOCYTES IN BLOOD BY AUTOMATED COUNT: 9.6 % (ref 2–10)
NEUTROPHILS (10*3/UL) IN BLOOD BY AUTOMATED COUNT: 3.09 X10E9/L (ref 1.6–5.5)
NEUTROPHILS/100 LEUKOCYTES IN BLOOD BY AUTOMATED COUNT: 60.1 % (ref 40–80)
NITRITE, URINE: POSITIVE
PH, URINE: 7 (ref 5–8)
PLATELETS (10*3/UL) IN BLOOD AUTOMATED COUNT: 171 X10E9/L (ref 150–450)
POTASSIUM (MMOL/L) IN SER/PLAS: 4.1 MMOL/L (ref 3.5–5.3)
PROTEIN, URINE: NEGATIVE MG/DL
PROTHROMBIN TIME (PT) IN PPP BY COAGULATION ASSAY: 13.8 SEC (ref 9.8–13.4)
RBC, URINE: 1 /HPF (ref 0–5)
SODIUM (MMOL/L) IN SER/PLAS: 138 MMOL/L (ref 136–145)
SPECIFIC GRAVITY, URINE: 1 (ref 1–1.03)
UREA NITROGEN (MG/DL) IN SER/PLAS: 36 MG/DL (ref 6–23)
UROBILINOGEN, URINE: <2 MG/DL (ref 0–1.9)
WBC CLUMPS, URINE: ABNORMAL /HPF
WBC, URINE: 126 /HPF (ref 0–5)

## 2023-06-16 ENCOUNTER — HOSPITAL ENCOUNTER (OUTPATIENT)
Dept: DATA CONVERSION | Facility: HOSPITAL | Age: 83
End: 2023-06-16
Attending: STUDENT IN AN ORGANIZED HEALTH CARE EDUCATION/TRAINING PROGRAM | Admitting: STUDENT IN AN ORGANIZED HEALTH CARE EDUCATION/TRAINING PROGRAM
Payer: MEDICARE

## 2023-06-16 DIAGNOSIS — J45.909 UNSPECIFIED ASTHMA, UNCOMPLICATED (HHS-HCC): ICD-10-CM

## 2023-06-16 DIAGNOSIS — I13.0 HYPERTENSIVE HEART AND CHRONIC KIDNEY DISEASE WITH HEART FAILURE AND STAGE 1 THROUGH STAGE 4 CHRONIC KIDNEY DISEASE, OR UNSPECIFIED CHRONIC KIDNEY DISEASE (MULTI): ICD-10-CM

## 2023-06-16 DIAGNOSIS — I50.42 CHRONIC COMBINED SYSTOLIC (CONGESTIVE) AND DIASTOLIC (CONGESTIVE) HEART FAILURE (MULTI): ICD-10-CM

## 2023-06-16 DIAGNOSIS — I48.20 CHRONIC ATRIAL FIBRILLATION, UNSPECIFIED (MULTI): ICD-10-CM

## 2023-06-16 DIAGNOSIS — E03.9 HYPOTHYROIDISM, UNSPECIFIED: ICD-10-CM

## 2023-06-16 DIAGNOSIS — E78.5 HYPERLIPIDEMIA, UNSPECIFIED: ICD-10-CM

## 2023-06-16 DIAGNOSIS — I25.10 ATHEROSCLEROTIC HEART DISEASE OF NATIVE CORONARY ARTERY WITHOUT ANGINA PECTORIS: ICD-10-CM

## 2023-06-16 DIAGNOSIS — Z79.82 LONG TERM (CURRENT) USE OF ASPIRIN: ICD-10-CM

## 2023-06-16 DIAGNOSIS — N30.01 ACUTE CYSTITIS WITH HEMATURIA: ICD-10-CM

## 2023-06-16 DIAGNOSIS — Z95.5 PRESENCE OF CORONARY ANGIOPLASTY IMPLANT AND GRAFT: ICD-10-CM

## 2023-06-16 DIAGNOSIS — N18.30 CHRONIC KIDNEY DISEASE, STAGE 3 UNSPECIFIED (MULTI): ICD-10-CM

## 2023-06-16 DIAGNOSIS — N17.9 ACUTE KIDNEY FAILURE, UNSPECIFIED (CMS-HCC): ICD-10-CM

## 2023-06-16 DIAGNOSIS — Z79.01 LONG TERM (CURRENT) USE OF ANTICOAGULANTS: ICD-10-CM

## 2023-06-16 DIAGNOSIS — N32.89 OTHER SPECIFIED DISORDERS OF BLADDER: ICD-10-CM

## 2023-06-16 DIAGNOSIS — N30.21 OTHER CHRONIC CYSTITIS WITH HEMATURIA: ICD-10-CM

## 2023-06-16 DIAGNOSIS — Z95.810 PRESENCE OF AUTOMATIC (IMPLANTABLE) CARDIAC DEFIBRILLATOR: ICD-10-CM

## 2023-06-16 DIAGNOSIS — D64.9 ANEMIA, UNSPECIFIED: ICD-10-CM

## 2023-06-29 LAB
COMPLETE PATHOLOGY REPORT: NORMAL
CONVERTED CLINICAL DIAGNOSIS-HISTORY: NORMAL
CONVERTED FINAL DIAGNOSIS: NORMAL
CONVERTED FINAL REPORT PDF LINK TO COPY AND PASTE: NORMAL
CONVERTED GROSS DESCRIPTION: NORMAL

## 2023-07-06 LAB
ALANINE AMINOTRANSFERASE (SGPT) (U/L) IN SER/PLAS: 5 U/L (ref 7–45)
ALBUMIN (G/DL) IN SER/PLAS: 3.8 G/DL (ref 3.4–5)
ALKALINE PHOSPHATASE (U/L) IN SER/PLAS: 138 U/L (ref 33–136)
ANION GAP IN SER/PLAS: 16 MMOL/L (ref 10–20)
ASPARTATE AMINOTRANSFERASE (SGOT) (U/L) IN SER/PLAS: 14 U/L (ref 9–39)
BACTERIA, URINE: ABNORMAL /HPF
BILIRUBIN TOTAL (MG/DL) IN SER/PLAS: 0.3 MG/DL (ref 0–1.2)
CALCIUM (MG/DL) IN SER/PLAS: 9.3 MG/DL (ref 8.6–10.6)
CARBON DIOXIDE, TOTAL (MMOL/L) IN SER/PLAS: 24 MMOL/L (ref 21–32)
CHLORIDE (MMOL/L) IN SER/PLAS: 106 MMOL/L (ref 98–107)
CREATININE (MG/DL) IN SER/PLAS: 1.88 MG/DL (ref 0.5–1.05)
ERYTHROCYTE DISTRIBUTION WIDTH (RATIO) BY AUTOMATED COUNT: 12.8 % (ref 11.5–14.5)
ERYTHROCYTE MEAN CORPUSCULAR HEMOGLOBIN CONCENTRATION (G/DL) BY AUTOMATED: 31.5 G/DL (ref 32–36)
ERYTHROCYTE MEAN CORPUSCULAR VOLUME (FL) BY AUTOMATED COUNT: 96 FL (ref 80–100)
ERYTHROCYTES (10*6/UL) IN BLOOD BY AUTOMATED COUNT: 2.7 X10E12/L (ref 4–5.2)
GFR FEMALE: 27 ML/MIN/1.73M2
GLUCOSE (MG/DL) IN SER/PLAS: 81 MG/DL (ref 74–99)
HEMATOCRIT (%) IN BLOOD BY AUTOMATED COUNT: 26 % (ref 36–46)
HEMOGLOBIN (G/DL) IN BLOOD: 8.2 G/DL (ref 12–16)
LEUKOCYTES (10*3/UL) IN BLOOD BY AUTOMATED COUNT: 4.3 X10E9/L (ref 4.4–11.3)
NRBC (PER 100 WBCS) BY AUTOMATED COUNT: 0 /100 WBC (ref 0–0)
PLATELETS (10*3/UL) IN BLOOD AUTOMATED COUNT: 177 X10E9/L (ref 150–450)
POTASSIUM (MMOL/L) IN SER/PLAS: 4.5 MMOL/L (ref 3.5–5.3)
PROTEIN TOTAL: 6 G/DL (ref 6.4–8.2)
RBC, URINE: 689 /HPF (ref 0–5)
SODIUM (MMOL/L) IN SER/PLAS: 141 MMOL/L (ref 136–145)
SQUAMOUS EPITHELIAL CELLS, URINE: 1 /HPF
UREA NITROGEN (MG/DL) IN SER/PLAS: 44 MG/DL (ref 6–23)
WBC CLUMPS, URINE: ABNORMAL /HPF
WBC, URINE: 885 /HPF (ref 0–5)

## 2023-07-08 LAB — URINE CULTURE: ABNORMAL

## 2023-07-13 DIAGNOSIS — D50.9 IRON DEFICIENCY ANEMIA, UNSPECIFIED IRON DEFICIENCY ANEMIA TYPE: ICD-10-CM

## 2023-07-13 RX ORDER — LANOLIN ALCOHOL/MO/W.PET/CERES
CREAM (GRAM) TOPICAL
Qty: 30 TABLET | Refills: 2 | Status: SHIPPED | OUTPATIENT
Start: 2023-07-13 | End: 2023-10-31

## 2023-07-24 LAB
ERYTHROCYTE DISTRIBUTION WIDTH (RATIO) BY AUTOMATED COUNT: 15 % (ref 11.5–14.5)
ERYTHROCYTE MEAN CORPUSCULAR HEMOGLOBIN CONCENTRATION (G/DL) BY AUTOMATED: 30 G/DL (ref 32–36)
ERYTHROCYTE MEAN CORPUSCULAR VOLUME (FL) BY AUTOMATED COUNT: 100 FL (ref 80–100)
ERYTHROCYTES (10*6/UL) IN BLOOD BY AUTOMATED COUNT: 3.26 X10E12/L (ref 4–5.2)
HEMATOCRIT (%) IN BLOOD BY AUTOMATED COUNT: 32.7 % (ref 36–46)
HEMOGLOBIN (G/DL) IN BLOOD: 9.8 G/DL (ref 12–16)
LEUKOCYTES (10*3/UL) IN BLOOD BY AUTOMATED COUNT: 3.7 X10E9/L (ref 4.4–11.3)
NRBC (PER 100 WBCS) BY AUTOMATED COUNT: 0 /100 WBC (ref 0–0)
PLATELETS (10*3/UL) IN BLOOD AUTOMATED COUNT: 162 X10E9/L (ref 150–450)

## 2023-07-27 LAB — URINE CULTURE: ABNORMAL

## 2023-09-07 VITALS — BODY MASS INDEX: 23.83 KG/M2 | WEIGHT: 134.48 LBS | HEIGHT: 63 IN

## 2023-09-08 PROBLEM — I25.10 CORONARY ARTERIOSCLEROSIS: Status: ACTIVE | Noted: 2023-09-08

## 2023-09-08 PROBLEM — Z79.899 MEDICATION MANAGEMENT: Status: ACTIVE | Noted: 2023-09-08

## 2023-09-08 PROBLEM — I10 ESSENTIAL HYPERTENSION: Status: ACTIVE | Noted: 2023-09-08

## 2023-09-08 PROBLEM — R31.0 INTERMITTENT GROSS HEMATURIA: Status: ACTIVE | Noted: 2023-09-08

## 2023-09-08 PROBLEM — Z95.5 HISTORY OF CORONARY ARTERY STENT PLACEMENT: Status: ACTIVE | Noted: 2023-09-08

## 2023-09-08 PROBLEM — E11.9 DIABETES MELLITUS (MULTI): Status: ACTIVE | Noted: 2023-09-08

## 2023-09-08 PROBLEM — Z96.641 STATUS POST RIGHT HIP REPLACEMENT: Status: ACTIVE | Noted: 2023-09-08

## 2023-09-08 PROBLEM — M48.07 NEUROFORAMINAL STENOSIS OF LUMBOSACRAL SPINE: Status: ACTIVE | Noted: 2023-09-08

## 2023-09-08 PROBLEM — Z96.649 HISTORY OF TOTAL HIP REPLACEMENT: Status: ACTIVE | Noted: 2023-09-08

## 2023-09-08 PROBLEM — Z95.3 S/P AORTIC VALVE REPLACEMENT WITH BIOPROSTHETIC VALVE: Status: ACTIVE | Noted: 2023-09-08

## 2023-09-08 PROBLEM — I50.9 HEART FAILURE (MULTI): Status: ACTIVE | Noted: 2023-09-08

## 2023-09-08 PROBLEM — J44.9 CHRONIC OBSTRUCTIVE LUNG DISEASE (MULTI): Status: ACTIVE | Noted: 2023-09-08

## 2023-09-08 PROBLEM — Z87.19 HISTORY OF GI BLEED: Status: ACTIVE | Noted: 2023-09-08

## 2023-09-08 PROBLEM — R31.0 GROSS HEMATURIA: Status: ACTIVE | Noted: 2023-09-08

## 2023-09-08 PROBLEM — N18.9 CHRONIC KIDNEY DISEASE: Status: ACTIVE | Noted: 2023-09-08

## 2023-09-08 PROBLEM — Z95.2 S/P AORTIC VALVE REPLACEMENT: Status: ACTIVE | Noted: 2023-09-08

## 2023-09-08 RX ORDER — TORSEMIDE 20 MG/1
2 TABLET ORAL DAILY
Status: ON HOLD | COMMUNITY
Start: 2019-06-19 | End: 2023-10-09 | Stop reason: ALTCHOICE

## 2023-09-08 RX ORDER — ASPIRIN 81 MG/1
1 TABLET ORAL DAILY
Status: ON HOLD | COMMUNITY
End: 2023-10-09 | Stop reason: ALTCHOICE

## 2023-09-08 RX ORDER — BUTALBITAL, ACETAMINOPHEN AND CAFFEINE 50; 325; 40 MG/1; MG/1; MG/1
1 TABLET ORAL 3 TIMES DAILY PRN
Status: ON HOLD | COMMUNITY
Start: 2023-07-08 | End: 2023-10-09 | Stop reason: ALTCHOICE

## 2023-09-30 NOTE — H&P
History & Physical Reviewed:   I have reviewed the History and Physical dated:  05-Jun-2023   History and Physical reviewed and relevant findings noted. Patient examined to review pertinent physical  findings.: Significant findings noted below   Findings: Patient has cardiac clearance by Yi Pugh with 's office. EKG from 6/14 reviewed and seen by anesthesia. Showing atrial flutter but this is not new. Stopped Plavix 6/9. Stopped Eliquis 6/13. She also took her morning dose of nitrofurantoin.   Home Medications Reviewed: no changes noted   Allergies Reviewed: no changes noted       ERAS (Enhanced Recovery After Surgery):  ·  ERAS Patient: no     Consent:   COVID-19 Consent:  ·  COVID-19 Risk Consent Surgeon has reviewed key risks related to the risk of pro COVID-19 and if they contract COVID-19 what the risks are.       Electronic Signatures:  Radha Castellanos (PAC)   (Signed 16-Jun-2023 09:49)   Authored: History & Physical Reviewed, ERAS, Consent, Note Completion  Greta Miner)   (Signed 17-Jun-2023 08:07)   Co-Signer: History & Physical Reviewed, ERAS, Consent, Note Completion    Last Updated: 17-Jun-2023 08:07 by Greta Miner)

## 2023-10-02 NOTE — OP NOTE
PROCEDURE DETAILS    Preoperative Diagnosis:  Bladder mass  Postoperative Diagnosis:  Hemorrhagic cystitis  Suspicious bladder tumor  Surgeon: Greta Miner  Resident/Fellow/Other Assistant: None of these were associated with this case    Procedure:  1. Bladder Resection Transurethral (TURBT)  2. Fulguration of bleeder    Anesthesia: No anesthesiologist associated with this case  Estimated Blood Loss: 10 cc  Findings: Several areas of spontaneous mucosal cracks and bleeding  Suspicious 1cm area of urothelial atypia on left anterolateral wall  No mass  Specimens(s) Collected: yes,  bladder lesion   Complications: none  Drains and/or Catheters: 18 Fr Zhang catheter  Patient Returned To/Condition: PACU/stable        Operative Report:   Patient was consented and antibiotics were started on call to OR. In OR, under GA, patient in dorsal lithotomy position, genitalia was scrubbed and draped in  the usual manner.  No 26 resectoscope was inserted and cystoscopy was performed showing no large mass as described on the ultrasound.  There was an area of urothelial atypic on the left anterolateral wall, 1cm in size, that was resected  The bladder started to bleed from multiple sites which were fulgurated but the urine remained slightly pink in color.  18 Fr Zhang catheter was placed to decompress the bladder.  The resected specimen was sent for pathological examination.  This concluded the procedure.  Patient tolerated it well, was awakened, and transferred to PACU in stable condition.                        Attestation:   Note Completion:  Attending Attestation I performed the procedure without a resident         Electronic Signatures:  Greta Miner)  (Signed 16-Jun-2023 17:33)   Authored: Post-Operative Note, Chart Review, Note Completion      Last Updated: 16-Jun-2023 17:33 by Greta Miner)

## 2023-10-03 DIAGNOSIS — G62.9 NEUROPATHY: ICD-10-CM

## 2023-10-03 RX ORDER — GABAPENTIN 300 MG/1
300 CAPSULE ORAL 2 TIMES DAILY
Qty: 60 CAPSULE | Refills: 0 | Status: SHIPPED | OUTPATIENT
Start: 2023-10-03 | End: 2023-11-18

## 2023-10-04 ENCOUNTER — LAB (OUTPATIENT)
Dept: LAB | Facility: LAB | Age: 83
End: 2023-10-04
Payer: MEDICARE

## 2023-10-04 DIAGNOSIS — I25.10 ATHEROSCLEROTIC HEART DISEASE OF NATIVE CORONARY ARTERY WITHOUT ANGINA PECTORIS: Primary | ICD-10-CM

## 2023-10-04 DIAGNOSIS — E87.6 HYPOKALEMIA: ICD-10-CM

## 2023-10-04 DIAGNOSIS — D64.9 ANEMIA, UNSPECIFIED: ICD-10-CM

## 2023-10-04 DIAGNOSIS — N18.30 CHRONIC KIDNEY DISEASE, STAGE 3 UNSPECIFIED (MULTI): ICD-10-CM

## 2023-10-04 LAB
ALBUMIN SERPL BCP-MCNC: 3.7 G/DL (ref 3.4–5)
ANION GAP SERPL CALC-SCNC: 14 MMOL/L (ref 10–20)
BUN SERPL-MCNC: 35 MG/DL (ref 6–23)
CALCIUM SERPL-MCNC: 8.9 MG/DL (ref 8.6–10.3)
CHLORIDE SERPL-SCNC: 100 MMOL/L (ref 98–107)
CO2 SERPL-SCNC: 25 MMOL/L (ref 21–32)
CREAT SERPL-MCNC: 1.61 MG/DL (ref 0.5–1.05)
ERYTHROCYTE [DISTWIDTH] IN BLOOD BY AUTOMATED COUNT: 14.5 % (ref 11.5–14.5)
FERRITIN SERPL-MCNC: 85 NG/ML (ref 8–150)
GFR SERPL CREATININE-BSD FRML MDRD: 32 ML/MIN/1.73M*2
GLUCOSE SERPL-MCNC: 88 MG/DL (ref 74–99)
HCT VFR BLD AUTO: 37.3 % (ref 36–46)
HGB BLD-MCNC: 11.7 G/DL (ref 12–16)
IRON SATN MFR SERPL: 10 % (ref 25–45)
IRON SERPL-MCNC: 25 UG/DL (ref 35–150)
MCH RBC QN AUTO: 29.3 PG (ref 26–34)
MCHC RBC AUTO-ENTMCNC: 31.4 G/DL (ref 32–36)
MCV RBC AUTO: 94 FL (ref 80–100)
NRBC BLD-RTO: 0 /100 WBCS (ref 0–0)
PHOSPHATE SERPL-MCNC: 3.5 MG/DL (ref 2.5–4.9)
PLATELET # BLD AUTO: 102 X10*3/UL (ref 150–450)
PMV BLD AUTO: 9.2 FL (ref 7.5–11.5)
POTASSIUM SERPL-SCNC: 4 MMOL/L (ref 3.5–5.3)
RBC # BLD AUTO: 3.99 X10*6/UL (ref 4–5.2)
SODIUM SERPL-SCNC: 135 MMOL/L (ref 136–145)
TIBC SERPL-MCNC: 262 UG/DL (ref 240–445)
UIBC SERPL-MCNC: 237 UG/DL (ref 110–370)
URATE SERPL-MCNC: 7.5 MG/DL (ref 2.3–6.7)
WBC # BLD AUTO: 2.5 X10*3/UL (ref 4.4–11.3)

## 2023-10-04 PROCEDURE — 82306 VITAMIN D 25 HYDROXY: CPT

## 2023-10-04 PROCEDURE — 36415 COLL VENOUS BLD VENIPUNCTURE: CPT

## 2023-10-05 LAB — 25(OH)D3 SERPL-MCNC: 27 NG/ML (ref 30–100)

## 2023-10-08 ENCOUNTER — HOSPITAL ENCOUNTER (OUTPATIENT)
Facility: HOSPITAL | Age: 83
Setting detail: OBSERVATION
Discharge: STILL ADMITTED OR ADMINISTRATIVE | End: 2023-10-11
Attending: EMERGENCY MEDICINE | Admitting: INTERNAL MEDICINE
Payer: MEDICARE

## 2023-10-08 ENCOUNTER — APPOINTMENT (OUTPATIENT)
Dept: RADIOLOGY | Facility: HOSPITAL | Age: 83
End: 2023-10-08
Payer: MEDICARE

## 2023-10-08 DIAGNOSIS — N12 PYELONEPHRITIS: ICD-10-CM

## 2023-10-08 DIAGNOSIS — G89.29 CHRONIC BILATERAL LOW BACK PAIN WITH BILATERAL SCIATICA: Primary | ICD-10-CM

## 2023-10-08 DIAGNOSIS — M16.11 ARTHROPATHY OF RIGHT HIP: ICD-10-CM

## 2023-10-08 DIAGNOSIS — N28.9 ACUTE ON CHRONIC RENAL INSUFFICIENCY: ICD-10-CM

## 2023-10-08 DIAGNOSIS — N18.9 ACUTE ON CHRONIC RENAL INSUFFICIENCY: ICD-10-CM

## 2023-10-08 DIAGNOSIS — M54.42 CHRONIC BILATERAL LOW BACK PAIN WITH BILATERAL SCIATICA: Primary | ICD-10-CM

## 2023-10-08 DIAGNOSIS — M54.40 BACK PAIN OF LUMBAR REGION WITH SCIATICA: ICD-10-CM

## 2023-10-08 DIAGNOSIS — M54.41 CHRONIC BILATERAL LOW BACK PAIN WITH BILATERAL SCIATICA: Primary | ICD-10-CM

## 2023-10-08 LAB
ALBUMIN SERPL BCP-MCNC: 4.1 G/DL (ref 3.4–5)
ALP SERPL-CCNC: 129 U/L (ref 33–136)
ALT SERPL W P-5'-P-CCNC: 9 U/L (ref 7–45)
AMORPH CRY #/AREA UR COMP ASSIST: ABNORMAL /HPF
ANION GAP SERPL CALC-SCNC: 15 MMOL/L (ref 10–20)
APPEARANCE UR: ABNORMAL
AST SERPL W P-5'-P-CCNC: 16 U/L (ref 9–39)
BACTERIA #/AREA URNS AUTO: ABNORMAL /HPF
BASOPHILS # BLD AUTO: 0.03 X10*3/UL (ref 0–0.1)
BASOPHILS NFR BLD AUTO: 0.3 %
BILIRUB SERPL-MCNC: 1 MG/DL (ref 0–1.2)
BILIRUB UR STRIP.AUTO-MCNC: NEGATIVE MG/DL
BUN SERPL-MCNC: 42 MG/DL (ref 6–23)
CALCIUM SERPL-MCNC: 9.6 MG/DL (ref 8.6–10.3)
CHLORIDE SERPL-SCNC: 97 MMOL/L (ref 98–107)
CO2 SERPL-SCNC: 27 MMOL/L (ref 21–32)
COLOR UR: YELLOW
CREAT SERPL-MCNC: 1.95 MG/DL (ref 0.5–1.05)
CRP SERPL-MCNC: 21.65 MG/DL
EOSINOPHIL # BLD AUTO: 0.09 X10*3/UL (ref 0–0.4)
EOSINOPHIL NFR BLD AUTO: 1 %
ERYTHROCYTE [DISTWIDTH] IN BLOOD BY AUTOMATED COUNT: 14.6 % (ref 11.5–14.5)
GFR SERPL CREATININE-BSD FRML MDRD: 26 ML/MIN/1.73M*2
GLUCOSE SERPL-MCNC: 96 MG/DL (ref 74–99)
GLUCOSE UR STRIP.AUTO-MCNC: NEGATIVE MG/DL
HCT VFR BLD AUTO: 39.5 % (ref 36–46)
HGB BLD-MCNC: 12.6 G/DL (ref 12–16)
HYALINE CASTS #/AREA URNS AUTO: ABNORMAL /LPF
IMM GRANULOCYTES # BLD AUTO: 0.04 X10*3/UL (ref 0–0.5)
IMM GRANULOCYTES NFR BLD AUTO: 0.4 % (ref 0–0.9)
KETONES UR STRIP.AUTO-MCNC: NEGATIVE MG/DL
LACTATE SERPL-SCNC: 2.5 MMOL/L (ref 0.4–2)
LEUKOCYTE ESTERASE UR QL STRIP.AUTO: ABNORMAL
LYMPHOCYTES # BLD AUTO: 1.41 X10*3/UL (ref 0.8–3)
LYMPHOCYTES NFR BLD AUTO: 15.3 %
MCH RBC QN AUTO: 29.7 PG (ref 26–34)
MCHC RBC AUTO-ENTMCNC: 31.9 G/DL (ref 32–36)
MCV RBC AUTO: 93 FL (ref 80–100)
MONOCYTES # BLD AUTO: 0.88 X10*3/UL (ref 0.05–0.8)
MONOCYTES NFR BLD AUTO: 9.6 %
NEUTROPHILS # BLD AUTO: 6.74 X10*3/UL (ref 1.6–5.5)
NEUTROPHILS NFR BLD AUTO: 73.4 %
NITRITE UR QL STRIP.AUTO: NEGATIVE
NRBC BLD-RTO: 0 /100 WBCS (ref 0–0)
PH UR STRIP.AUTO: 7 [PH]
PLATELET # BLD AUTO: 137 X10*3/UL (ref 150–450)
PMV BLD AUTO: 10.2 FL (ref 7.5–11.5)
POTASSIUM SERPL-SCNC: 4.2 MMOL/L (ref 3.5–5.3)
PROT SERPL-MCNC: 7.4 G/DL (ref 6.4–8.2)
PROT UR STRIP.AUTO-MCNC: ABNORMAL MG/DL
RBC # BLD AUTO: 4.24 X10*6/UL (ref 4–5.2)
RBC # UR STRIP.AUTO: ABNORMAL /UL
RBC #/AREA URNS AUTO: ABNORMAL /HPF
SODIUM SERPL-SCNC: 135 MMOL/L (ref 136–145)
SP GR UR STRIP.AUTO: 1.01
SQUAMOUS #/AREA URNS AUTO: ABNORMAL /HPF
UROBILINOGEN UR STRIP.AUTO-MCNC: <2 MG/DL
WBC # BLD AUTO: 9.2 X10*3/UL (ref 4.4–11.3)
WBC #/AREA URNS AUTO: >50 /HPF
WBC CLUMPS #/AREA URNS AUTO: ABNORMAL /HPF

## 2023-10-08 PROCEDURE — 96365 THER/PROPH/DIAG IV INF INIT: CPT

## 2023-10-08 PROCEDURE — 2500000004 HC RX 250 GENERAL PHARMACY W/ HCPCS (ALT 636 FOR OP/ED): Performed by: EMERGENCY MEDICINE

## 2023-10-08 PROCEDURE — 85025 COMPLETE CBC W/AUTO DIFF WBC: CPT | Performed by: EMERGENCY MEDICINE

## 2023-10-08 PROCEDURE — 99285 EMERGENCY DEPT VISIT HI MDM: CPT | Performed by: EMERGENCY MEDICINE

## 2023-10-08 PROCEDURE — 86140 C-REACTIVE PROTEIN: CPT | Performed by: EMERGENCY MEDICINE

## 2023-10-08 PROCEDURE — 72131 CT LUMBAR SPINE W/O DYE: CPT | Mod: FOREIGN READ | Performed by: RADIOLOGY

## 2023-10-08 PROCEDURE — 72131 CT LUMBAR SPINE W/O DYE: CPT | Mod: ME

## 2023-10-08 PROCEDURE — 36415 COLL VENOUS BLD VENIPUNCTURE: CPT | Performed by: EMERGENCY MEDICINE

## 2023-10-08 PROCEDURE — 81001 URINALYSIS AUTO W/SCOPE: CPT | Performed by: EMERGENCY MEDICINE

## 2023-10-08 PROCEDURE — 87086 URINE CULTURE/COLONY COUNT: CPT | Mod: GENLAB | Performed by: EMERGENCY MEDICINE

## 2023-10-08 PROCEDURE — 83605 ASSAY OF LACTIC ACID: CPT | Mod: 91 | Performed by: EMERGENCY MEDICINE

## 2023-10-08 PROCEDURE — 87086 URINE CULTURE/COLONY COUNT: CPT | Mod: CMCLAB,GENLAB | Performed by: EMERGENCY MEDICINE

## 2023-10-08 PROCEDURE — 87186 SC STD MICRODIL/AGAR DIL: CPT | Mod: CMCLAB,GENLAB | Performed by: EMERGENCY MEDICINE

## 2023-10-08 PROCEDURE — 80053 COMPREHEN METABOLIC PANEL: CPT | Performed by: EMERGENCY MEDICINE

## 2023-10-08 PROCEDURE — 96375 TX/PRO/DX INJ NEW DRUG ADDON: CPT

## 2023-10-08 PROCEDURE — G1004 CDSM NDSC: HCPCS

## 2023-10-08 RX ORDER — CEFTRIAXONE 2 G/50ML
2 INJECTION, SOLUTION INTRAVENOUS ONCE
Status: COMPLETED | OUTPATIENT
Start: 2023-10-08 | End: 2023-10-08

## 2023-10-08 RX ORDER — MORPHINE SULFATE 4 MG/ML
4 INJECTION, SOLUTION INTRAMUSCULAR; INTRAVENOUS ONCE
Status: COMPLETED | OUTPATIENT
Start: 2023-10-08 | End: 2023-10-08

## 2023-10-08 RX ORDER — ONDANSETRON HYDROCHLORIDE 2 MG/ML
4 INJECTION, SOLUTION INTRAVENOUS ONCE
Status: COMPLETED | OUTPATIENT
Start: 2023-10-08 | End: 2023-10-08

## 2023-10-08 RX ADMIN — MORPHINE SULFATE 4 MG: 4 INJECTION, SOLUTION INTRAMUSCULAR; INTRAVENOUS at 20:17

## 2023-10-08 RX ADMIN — ONDANSETRON 4 MG: 2 INJECTION INTRAMUSCULAR; INTRAVENOUS at 20:17

## 2023-10-08 RX ADMIN — CEFTRIAXONE 2 G: 2 INJECTION, SOLUTION INTRAVENOUS at 22:30

## 2023-10-08 RX ADMIN — SODIUM CHLORIDE 1000 ML: 9 INJECTION, SOLUTION INTRAVENOUS at 23:21

## 2023-10-08 ASSESSMENT — PAIN DESCRIPTION - DESCRIPTORS: DESCRIPTORS: SHARP;SHOOTING

## 2023-10-08 ASSESSMENT — PAIN DESCRIPTION - LOCATION: LOCATION: BACK

## 2023-10-08 ASSESSMENT — COLUMBIA-SUICIDE SEVERITY RATING SCALE - C-SSRS
1. IN THE PAST MONTH, HAVE YOU WISHED YOU WERE DEAD OR WISHED YOU COULD GO TO SLEEP AND NOT WAKE UP?: NO
2. HAVE YOU ACTUALLY HAD ANY THOUGHTS OF KILLING YOURSELF?: NO
6. HAVE YOU EVER DONE ANYTHING, STARTED TO DO ANYTHING, OR PREPARED TO DO ANYTHING TO END YOUR LIFE?: NO

## 2023-10-08 ASSESSMENT — PAIN SCALES - GENERAL
PAINLEVEL_OUTOF10: 10 - WORST POSSIBLE PAIN
PAINLEVEL_OUTOF10: 10 - WORST POSSIBLE PAIN

## 2023-10-08 ASSESSMENT — PAIN DESCRIPTION - PAIN TYPE: TYPE: ACUTE PAIN

## 2023-10-08 ASSESSMENT — PAIN - FUNCTIONAL ASSESSMENT: PAIN_FUNCTIONAL_ASSESSMENT: 0-10

## 2023-10-09 ENCOUNTER — APPOINTMENT (OUTPATIENT)
Dept: RADIOLOGY | Facility: HOSPITAL | Age: 83
End: 2023-10-09
Payer: MEDICARE

## 2023-10-09 DIAGNOSIS — M54.50 LOWER BACK PAIN: Primary | ICD-10-CM

## 2023-10-09 PROBLEM — G89.29 CHRONIC BILATERAL LOW BACK PAIN WITH BILATERAL SCIATICA: Status: ACTIVE | Noted: 2023-10-09

## 2023-10-09 PROBLEM — M54.41 CHRONIC BILATERAL LOW BACK PAIN WITH BILATERAL SCIATICA: Status: ACTIVE | Noted: 2023-10-09

## 2023-10-09 PROBLEM — M54.42 CHRONIC BILATERAL LOW BACK PAIN WITH BILATERAL SCIATICA: Status: ACTIVE | Noted: 2023-10-09

## 2023-10-09 PROBLEM — M54.40 BACK PAIN OF LUMBAR REGION WITH SCIATICA: Status: ACTIVE | Noted: 2023-10-09

## 2023-10-09 LAB
ANION GAP SERPL CALC-SCNC: 10 MMOL/L (ref 10–20)
BUN SERPL-MCNC: 37 MG/DL (ref 6–23)
CALCIUM SERPL-MCNC: 8.8 MG/DL (ref 8.6–10.3)
CHLORIDE SERPL-SCNC: 101 MMOL/L (ref 98–107)
CO2 SERPL-SCNC: 26 MMOL/L (ref 21–32)
CREAT SERPL-MCNC: 1.66 MG/DL (ref 0.5–1.05)
ERYTHROCYTE [DISTWIDTH] IN BLOOD BY AUTOMATED COUNT: 14.4 % (ref 11.5–14.5)
GFR SERPL CREATININE-BSD FRML MDRD: 31 ML/MIN/1.73M*2
GLUCOSE SERPL-MCNC: 96 MG/DL (ref 74–99)
HCT VFR BLD AUTO: 33.5 % (ref 36–46)
HGB BLD-MCNC: 10.6 G/DL (ref 12–16)
LACTATE SERPL-SCNC: 0.6 MMOL/L (ref 0.4–2)
MCH RBC QN AUTO: 29.3 PG (ref 26–34)
MCHC RBC AUTO-ENTMCNC: 31.6 G/DL (ref 32–36)
MCV RBC AUTO: 93 FL (ref 80–100)
NRBC BLD-RTO: 0 /100 WBCS (ref 0–0)
PLATELET # BLD AUTO: 101 X10*3/UL (ref 150–450)
PMV BLD AUTO: 9.1 FL (ref 7.5–11.5)
POTASSIUM SERPL-SCNC: 3.9 MMOL/L (ref 3.5–5.3)
RBC # BLD AUTO: 3.62 X10*6/UL (ref 4–5.2)
SODIUM SERPL-SCNC: 133 MMOL/L (ref 136–145)
WBC # BLD AUTO: 6.8 X10*3/UL (ref 4.4–11.3)

## 2023-10-09 PROCEDURE — 80048 BASIC METABOLIC PNL TOTAL CA: CPT | Performed by: NURSE PRACTITIONER

## 2023-10-09 PROCEDURE — 36415 COLL VENOUS BLD VENIPUNCTURE: CPT | Performed by: NURSE PRACTITIONER

## 2023-10-09 PROCEDURE — 85027 COMPLETE CBC AUTOMATED: CPT | Performed by: NURSE PRACTITIONER

## 2023-10-09 PROCEDURE — 71045 X-RAY EXAM CHEST 1 VIEW: CPT | Mod: FY,FR

## 2023-10-09 PROCEDURE — 2500000001 HC RX 250 WO HCPCS SELF ADMINISTERED DRUGS (ALT 637 FOR MEDICARE OP): Performed by: NURSE PRACTITIONER

## 2023-10-09 PROCEDURE — 83605 ASSAY OF LACTIC ACID: CPT | Performed by: EMERGENCY MEDICINE

## 2023-10-09 PROCEDURE — 2500000004 HC RX 250 GENERAL PHARMACY W/ HCPCS (ALT 636 FOR OP/ED): Mod: MUE | Performed by: NURSE PRACTITIONER

## 2023-10-09 PROCEDURE — 2500000004 HC RX 250 GENERAL PHARMACY W/ HCPCS (ALT 636 FOR OP/ED): Performed by: NURSE PRACTITIONER

## 2023-10-09 PROCEDURE — G0378 HOSPITAL OBSERVATION PER HR: HCPCS

## 2023-10-09 PROCEDURE — 99223 1ST HOSP IP/OBS HIGH 75: CPT | Performed by: NURSE PRACTITIONER

## 2023-10-09 PROCEDURE — 71045 X-RAY EXAM CHEST 1 VIEW: CPT | Mod: FOREIGN READ | Performed by: RADIOLOGY

## 2023-10-09 RX ORDER — OXYCODONE HYDROCHLORIDE 5 MG/1
5 TABLET ORAL EVERY 6 HOURS PRN
Status: DISCONTINUED | OUTPATIENT
Start: 2023-10-09 | End: 2023-10-11 | Stop reason: HOSPADM

## 2023-10-09 RX ORDER — ONDANSETRON HYDROCHLORIDE 2 MG/ML
4 INJECTION, SOLUTION INTRAVENOUS EVERY 6 HOURS PRN
Status: DISCONTINUED | OUTPATIENT
Start: 2023-10-09 | End: 2023-10-11 | Stop reason: HOSPADM

## 2023-10-09 RX ORDER — METOPROLOL SUCCINATE 25 MG/1
25 TABLET, EXTENDED RELEASE ORAL NIGHTLY
Status: DISCONTINUED | OUTPATIENT
Start: 2023-10-09 | End: 2023-10-11 | Stop reason: HOSPADM

## 2023-10-09 RX ORDER — LANOLIN ALCOHOL/MO/W.PET/CERES
400 CREAM (GRAM) TOPICAL DAILY
Status: DISCONTINUED | OUTPATIENT
Start: 2023-10-09 | End: 2023-10-11 | Stop reason: HOSPADM

## 2023-10-09 RX ORDER — DAPAGLIFLOZIN 10 MG/1
10 TABLET, FILM COATED ORAL DAILY
Status: DISCONTINUED | OUTPATIENT
Start: 2023-10-09 | End: 2023-10-11 | Stop reason: HOSPADM

## 2023-10-09 RX ORDER — CLOPIDOGREL BISULFATE 75 MG/1
75 TABLET ORAL DAILY
Status: DISCONTINUED | OUTPATIENT
Start: 2023-10-09 | End: 2023-10-11 | Stop reason: HOSPADM

## 2023-10-09 RX ORDER — PANTOPRAZOLE SODIUM 40 MG/1
40 TABLET, DELAYED RELEASE ORAL 2 TIMES DAILY
Status: DISCONTINUED | OUTPATIENT
Start: 2023-10-09 | End: 2023-10-11 | Stop reason: HOSPADM

## 2023-10-09 RX ORDER — TIZANIDINE 4 MG/1
2 TABLET ORAL DAILY
Status: DISCONTINUED | OUTPATIENT
Start: 2023-10-09 | End: 2023-10-11 | Stop reason: HOSPADM

## 2023-10-09 RX ORDER — ACETAMINOPHEN 325 MG/1
975 TABLET ORAL EVERY 8 HOURS
Status: DISCONTINUED | OUTPATIENT
Start: 2023-10-09 | End: 2023-10-11 | Stop reason: HOSPADM

## 2023-10-09 RX ORDER — FERROUS SULFATE 325(65) MG
1 TABLET ORAL 2 TIMES DAILY
Status: DISCONTINUED | OUTPATIENT
Start: 2023-10-09 | End: 2023-10-11 | Stop reason: HOSPADM

## 2023-10-09 RX ORDER — VALSARTAN 80 MG/1
40 TABLET ORAL DAILY
Status: DISCONTINUED | OUTPATIENT
Start: 2023-10-09 | End: 2023-10-11 | Stop reason: HOSPADM

## 2023-10-09 RX ORDER — GABAPENTIN 300 MG/1
300 CAPSULE ORAL 2 TIMES DAILY
Status: DISCONTINUED | OUTPATIENT
Start: 2023-10-09 | End: 2023-10-11 | Stop reason: HOSPADM

## 2023-10-09 RX ORDER — TIZANIDINE 4 MG/1
2 TABLET ORAL 3 TIMES DAILY
Status: DISCONTINUED | OUTPATIENT
Start: 2023-10-09 | End: 2023-10-09

## 2023-10-09 RX ORDER — PREDNISONE 20 MG/1
40 TABLET ORAL DAILY
Status: DISCONTINUED | OUTPATIENT
Start: 2023-10-09 | End: 2023-10-11 | Stop reason: HOSPADM

## 2023-10-09 RX ORDER — CEFTRIAXONE 2 G/50ML
2 INJECTION, SOLUTION INTRAVENOUS ONCE
Status: COMPLETED | OUTPATIENT
Start: 2023-10-09 | End: 2023-10-09

## 2023-10-09 RX ADMIN — VALSARTAN 40 MG: 80 TABLET, FILM COATED ORAL at 09:24

## 2023-10-09 RX ADMIN — APIXABAN 2.5 MG: 2.5 TABLET, FILM COATED ORAL at 09:25

## 2023-10-09 RX ADMIN — PANTOPRAZOLE SODIUM 40 MG: 40 TABLET, DELAYED RELEASE ORAL at 09:25

## 2023-10-09 RX ADMIN — GABAPENTIN 300 MG: 300 CAPSULE ORAL at 20:40

## 2023-10-09 RX ADMIN — SALINE NASAL SPRAY 2 SPRAY: 1.5 SOLUTION NASAL at 09:25

## 2023-10-09 RX ADMIN — METOPROLOL SUCCINATE 25 MG: 25 TABLET, FILM COATED, EXTENDED RELEASE ORAL at 20:40

## 2023-10-09 RX ADMIN — FERROUS SULFATE TAB 325 MG (65 MG ELEMENTAL FE) 325 MG: 325 (65 FE) TAB at 09:24

## 2023-10-09 RX ADMIN — PREDNISONE 40 MG: 20 TABLET ORAL at 11:53

## 2023-10-09 RX ADMIN — Medication 400 MG: at 09:25

## 2023-10-09 RX ADMIN — DAPAGLIFLOZIN 10 MG: 10 TABLET, FILM COATED ORAL at 09:25

## 2023-10-09 RX ADMIN — ACETAMINOPHEN 975 MG: 325 TABLET ORAL at 19:16

## 2023-10-09 RX ADMIN — GABAPENTIN 300 MG: 300 CAPSULE ORAL at 09:24

## 2023-10-09 RX ADMIN — CLOPIDOGREL BISULFATE 75 MG: 75 TABLET ORAL at 09:25

## 2023-10-09 RX ADMIN — APIXABAN 2.5 MG: 2.5 TABLET, FILM COATED ORAL at 20:40

## 2023-10-09 RX ADMIN — TIZANIDINE 2 MG: 4 TABLET ORAL at 11:53

## 2023-10-09 RX ADMIN — FERROUS SULFATE TAB 325 MG (65 MG ELEMENTAL FE) 325 MG: 325 (65 FE) TAB at 20:40

## 2023-10-09 RX ADMIN — PANTOPRAZOLE SODIUM 40 MG: 40 TABLET, DELAYED RELEASE ORAL at 20:40

## 2023-10-09 RX ADMIN — CEFTRIAXONE 2 G: 2 INJECTION, SOLUTION INTRAVENOUS at 22:11

## 2023-10-09 RX ADMIN — ACETAMINOPHEN 975 MG: 325 TABLET ORAL at 11:53

## 2023-10-09 SDOH — ECONOMIC STABILITY: INCOME INSECURITY: HOW HARD IS IT FOR YOU TO PAY FOR THE VERY BASICS LIKE FOOD, HOUSING, MEDICAL CARE, AND HEATING?: NOT HARD AT ALL

## 2023-10-09 SDOH — SOCIAL STABILITY: SOCIAL INSECURITY: WITHIN THE LAST YEAR, HAVE YOU BEEN HUMILIATED OR EMOTIONALLY ABUSED IN OTHER WAYS BY YOUR PARTNER OR EX-PARTNER?: NO

## 2023-10-09 SDOH — ECONOMIC STABILITY: INCOME INSECURITY: IN THE PAST 12 MONTHS, HAS THE ELECTRIC, GAS, OIL, OR WATER COMPANY THREATENED TO SHUT OFF SERVICE IN YOUR HOME?: NO

## 2023-10-09 SDOH — ECONOMIC STABILITY: TRANSPORTATION INSECURITY
IN THE PAST 12 MONTHS, HAS LACK OF TRANSPORTATION KEPT YOU FROM MEETINGS, WORK, OR FROM GETTING THINGS NEEDED FOR DAILY LIVING?: NO

## 2023-10-09 SDOH — SOCIAL STABILITY: SOCIAL INSECURITY
WITHIN THE LAST YEAR, HAVE TO BEEN RAPED OR FORCED TO HAVE ANY KIND OF SEXUAL ACTIVITY BY YOUR PARTNER OR EX-PARTNER?: NO

## 2023-10-09 SDOH — ECONOMIC STABILITY: HOUSING INSECURITY
IN THE LAST 12 MONTHS, WAS THERE A TIME WHEN YOU DID NOT HAVE A STEADY PLACE TO SLEEP OR SLEPT IN A SHELTER (INCLUDING NOW)?: NO

## 2023-10-09 SDOH — SOCIAL STABILITY: SOCIAL INSECURITY: WERE YOU ABLE TO COMPLETE ALL THE BEHAVIORAL HEALTH SCREENINGS?: YES

## 2023-10-09 SDOH — SOCIAL STABILITY: SOCIAL INSECURITY
WITHIN THE LAST YEAR, HAVE YOU BEEN KICKED, HIT, SLAPPED, OR OTHERWISE PHYSICALLY HURT BY YOUR PARTNER OR EX-PARTNER?: NO

## 2023-10-09 SDOH — SOCIAL STABILITY: SOCIAL INSECURITY: ARE THERE ANY APPARENT SIGNS OF INJURIES/BEHAVIORS THAT COULD BE RELATED TO ABUSE/NEGLECT?: NO

## 2023-10-09 SDOH — ECONOMIC STABILITY: INCOME INSECURITY: IN THE LAST 12 MONTHS, WAS THERE A TIME WHEN YOU WERE NOT ABLE TO PAY THE MORTGAGE OR RENT ON TIME?: NO

## 2023-10-09 SDOH — SOCIAL STABILITY: SOCIAL INSECURITY: DO YOU FEEL ANYONE HAS EXPLOITED OR TAKEN ADVANTAGE OF YOU FINANCIALLY OR OF YOUR PERSONAL PROPERTY?: NO

## 2023-10-09 SDOH — SOCIAL STABILITY: SOCIAL INSECURITY: DOES ANYONE TRY TO KEEP YOU FROM HAVING/CONTACTING OTHER FRIENDS OR DOING THINGS OUTSIDE YOUR HOME?: NO

## 2023-10-09 SDOH — SOCIAL STABILITY: SOCIAL INSECURITY: WITHIN THE LAST YEAR, HAVE YOU BEEN AFRAID OF YOUR PARTNER OR EX-PARTNER?: NO

## 2023-10-09 SDOH — SOCIAL STABILITY: SOCIAL INSECURITY: HAS ANYONE EVER THREATENED TO HURT YOUR FAMILY OR YOUR PETS?: NO

## 2023-10-09 SDOH — ECONOMIC STABILITY: TRANSPORTATION INSECURITY
IN THE PAST 12 MONTHS, HAS THE LACK OF TRANSPORTATION KEPT YOU FROM MEDICAL APPOINTMENTS OR FROM GETTING MEDICATIONS?: NO

## 2023-10-09 SDOH — ECONOMIC STABILITY: FOOD INSECURITY: WITHIN THE PAST 12 MONTHS, THE FOOD YOU BOUGHT JUST DIDN'T LAST AND YOU DIDN'T HAVE MONEY TO GET MORE.: NEVER TRUE

## 2023-10-09 SDOH — SOCIAL STABILITY: SOCIAL INSECURITY: DO YOU FEEL UNSAFE GOING BACK TO THE PLACE WHERE YOU ARE LIVING?: NO

## 2023-10-09 SDOH — SOCIAL STABILITY: SOCIAL INSECURITY: HAVE YOU HAD THOUGHTS OF HARMING ANYONE ELSE?: NO

## 2023-10-09 SDOH — ECONOMIC STABILITY: FOOD INSECURITY: WITHIN THE PAST 12 MONTHS, YOU WORRIED THAT YOUR FOOD WOULD RUN OUT BEFORE YOU GOT MONEY TO BUY MORE.: NEVER TRUE

## 2023-10-09 SDOH — ECONOMIC STABILITY: HOUSING INSECURITY: IN THE LAST 12 MONTHS, HOW MANY PLACES HAVE YOU LIVED?: 1

## 2023-10-09 SDOH — SOCIAL STABILITY: SOCIAL INSECURITY: ABUSE: ADULT

## 2023-10-09 SDOH — SOCIAL STABILITY: SOCIAL INSECURITY: ARE YOU OR HAVE YOU BEEN THREATENED OR ABUSED PHYSICALLY, EMOTIONALLY, OR SEXUALLY BY ANYONE?: NO

## 2023-10-09 ASSESSMENT — ENCOUNTER SYMPTOMS
ENDOCRINE NEGATIVE: 1
GASTROINTESTINAL NEGATIVE: 1
FEVER: 0
EYES NEGATIVE: 1
RESPIRATORY NEGATIVE: 1
BACK PAIN: 1
CHILLS: 0
ACTIVITY CHANGE: 1
HEMATOLOGIC/LYMPHATIC NEGATIVE: 1
PSYCHIATRIC NEGATIVE: 1
CARDIOVASCULAR NEGATIVE: 1

## 2023-10-09 ASSESSMENT — ACTIVITIES OF DAILY LIVING (ADL)
JUDGMENT_ADEQUATE_SAFELY_COMPLETE_DAILY_ACTIVITIES: YES
HEARING - LEFT EAR: FUNCTIONAL
GROOMING: NEEDS ASSISTANCE
DRESSING YOURSELF: NEEDS ASSISTANCE
PATIENT'S MEMORY ADEQUATE TO SAFELY COMPLETE DAILY ACTIVITIES?: YES
JUDGMENT_ADEQUATE_SAFELY_COMPLETE_DAILY_ACTIVITIES: YES
HEARING - RIGHT EAR: FUNCTIONAL
FEEDING YOURSELF: INDEPENDENT
PATIENT'S MEMORY ADEQUATE TO SAFELY COMPLETE DAILY ACTIVITIES?: YES
GROOMING: INDEPENDENT
ADEQUATE_TO_COMPLETE_ADL: YES
TOILETING: INDEPENDENT
ASSISTIVE_DEVICE: WALKER
HEARING - RIGHT EAR: FUNCTIONAL
TOILETING: NEEDS ASSISTANCE
BATHING: INDEPENDENT
DRESSING YOURSELF: INDEPENDENT
HEARING - LEFT EAR: FUNCTIONAL
ADEQUATE_TO_COMPLETE_ADL: YES
WALKS IN HOME: INDEPENDENT
BATHING: NEEDS ASSISTANCE
FEEDING YOURSELF: INDEPENDENT
WALKS IN HOME: INDEPENDENT

## 2023-10-09 ASSESSMENT — PAIN SCALES - GENERAL
PAINLEVEL_OUTOF10: 2
PAINLEVEL_OUTOF10: 0 - NO PAIN
PAINLEVEL_OUTOF10: 2
PAINLEVEL_OUTOF10: 2
PAINLEVEL_OUTOF10: 0 - NO PAIN

## 2023-10-09 ASSESSMENT — COGNITIVE AND FUNCTIONAL STATUS - GENERAL
PATIENT BASELINE BEDBOUND: NO
DAILY ACTIVITIY SCORE: 14
DRESSING REGULAR UPPER BODY CLOTHING: A LOT
MOVING TO AND FROM BED TO CHAIR: A LOT
DAILY ACTIVITIY SCORE: 19
WALKING IN HOSPITAL ROOM: A LOT
CLIMB 3 TO 5 STEPS WITH RAILING: A LITTLE
STANDING UP FROM CHAIR USING ARMS: A LITTLE
DRESSING REGULAR UPPER BODY CLOTHING: A LITTLE
TOILETING: A LOT
HELP NEEDED FOR BATHING: A LOT
MOVING FROM LYING ON BACK TO SITTING ON SIDE OF FLAT BED WITH BEDRAILS: A LOT
TURNING FROM BACK TO SIDE WHILE IN FLAT BAD: A LOT
MOVING FROM LYING ON BACK TO SITTING ON SIDE OF FLAT BED WITH BEDRAILS: A LITTLE
HELP NEEDED FOR BATHING: A LITTLE
CLIMB 3 TO 5 STEPS WITH RAILING: A LOT
PERSONAL GROOMING: A LITTLE
TURNING FROM BACK TO SIDE WHILE IN FLAT BAD: A LITTLE
WALKING IN HOSPITAL ROOM: A LITTLE
STANDING UP FROM CHAIR USING ARMS: A LOT
DRESSING REGULAR LOWER BODY CLOTHING: A LOT
PERSONAL GROOMING: A LOT
MOBILITY SCORE: 12
TOILETING: A LITTLE
DRESSING REGULAR LOWER BODY CLOTHING: A LITTLE
MOBILITY SCORE: 18
MOVING TO AND FROM BED TO CHAIR: A LITTLE

## 2023-10-09 ASSESSMENT — PATIENT HEALTH QUESTIONNAIRE - PHQ9
2. FEELING DOWN, DEPRESSED OR HOPELESS: NOT AT ALL
SUM OF ALL RESPONSES TO PHQ9 QUESTIONS 1 & 2: 0
1. LITTLE INTEREST OR PLEASURE IN DOING THINGS: NOT AT ALL

## 2023-10-09 ASSESSMENT — COLUMBIA-SUICIDE SEVERITY RATING SCALE - C-SSRS
6. HAVE YOU EVER DONE ANYTHING, STARTED TO DO ANYTHING, OR PREPARED TO DO ANYTHING TO END YOUR LIFE?: NO
1. IN THE PAST MONTH, HAVE YOU WISHED YOU WERE DEAD OR WISHED YOU COULD GO TO SLEEP AND NOT WAKE UP?: NO
2. HAVE YOU ACTUALLY HAD ANY THOUGHTS OF KILLING YOURSELF?: NO

## 2023-10-09 ASSESSMENT — PAIN - FUNCTIONAL ASSESSMENT
PAIN_FUNCTIONAL_ASSESSMENT: 0-10
PAIN_FUNCTIONAL_ASSESSMENT: 0-10
PAIN_FUNCTIONAL_ASSESSMENT: CPOT (CRITICAL CARE PAIN OBSERVATION TOOL)
PAIN_FUNCTIONAL_ASSESSMENT: CPOT (CRITICAL CARE PAIN OBSERVATION TOOL)
PAIN_FUNCTIONAL_ASSESSMENT: 0-10

## 2023-10-09 ASSESSMENT — LIFESTYLE VARIABLES
AUDIT-C TOTAL SCORE: 0
SUBSTANCE_ABUSE_PAST_12_MONTHS: NO
HOW OFTEN DO YOU HAVE 6 OR MORE DRINKS ON ONE OCCASION: NEVER
HOW OFTEN DO YOU HAVE A DRINK CONTAINING ALCOHOL: NEVER
PRESCIPTION_ABUSE_PAST_12_MONTHS: NO
HOW MANY STANDARD DRINKS CONTAINING ALCOHOL DO YOU HAVE ON A TYPICAL DAY: PATIENT DOES NOT DRINK
SKIP TO QUESTIONS 9-10: 1
AUDIT-C TOTAL SCORE: 0

## 2023-10-09 ASSESSMENT — PAIN SCALES - PAIN ASSESSMENT IN ADVANCED DEMENTIA (PAINAD)
FACIALEXPRESSION: SMILING OR INEXPRESSIVE
CONSOLABILITY: NO NEED TO CONSOLE

## 2023-10-09 NOTE — PROGRESS NOTES
TCC spoke with patient regarding discharge planning and home going needs. Patient lives with her son Pradeep. Patient says she is independent  with ADLs.  She has DME:  BSC, Raised toilet, grab bars, walk in shower with seat, adjustable bed, rollator, WW, and a cane.  She lives in a 2 story house and she utilizes the first floor.  There is a ramp going into the house.  She has two dogs.  She denies C.  Confirmed with patient PCP is Dr. Salgado and was scene recently.    Discharge Plan is for patient to return home.. Her son can drive her home.

## 2023-10-09 NOTE — CARE PLAN
The patient's goals for the shift include pain at 6 or less    The clinical goals for the shift include pt will rate pain at 5 or less    Pt is new admit for low back pain Pt denies pain unless moved. Usually independent with ADLs @ home. On antibiotic for UTI- denies symptoms. VS stable

## 2023-10-09 NOTE — ED PROVIDER NOTES
HPI   Chief Complaint   Patient presents with    Back Pain     Low back pain that began last night., denies any falls or injury.       Patient is a pleasant 82-year-old female a little long and comes in tonight because of low back pain.  She says her back hurts all the time but today is much worse.  It hurts to move breeze to try to rollover and she can barely stand with the pain.  She has also had some alternating hot and cold episodes.  Denies any shortness of breath or cough.  No chest pain.  She states the pain in her low back goes into both hips and buttocks.  No neurological deficits in the lower extremities.  Her work-up shows a lot of degenerative disc changes and arthritic spurs in the low back.  Urinalysis consistent with UTI which we will treat as a pyelonephritis.  I need to get a chest x-ray as well to rule out any other source of infection since her CRP was quite high tonight.                          Garcia Coma Scale Score: 15                  Patient History   Past Medical History:   Diagnosis Date    Acute on chronic diastolic (congestive) heart failure (CMS/[facility]) 01/28/2020    Acute on chronic diastolic CHF (congestive heart failure), NYHA class 3    Aneurysm of the ascending aorta, without rupture (CMS/[facility]) 01/28/2020    Ascending aortic aneurysm    Atherosclerotic heart disease of native coronary artery with other forms of angina pectoris (CMS/[facility]) 11/15/2019    Coronary artery disease with other form of angina pectoris    H/O ascending aortic replacement 02/01/2023    Heart failure, unspecified (CMS/[facility]) 08/26/2019    CHF exacerbation    Nonrheumatic aortic (valve) insufficiency 06/23/2022    Severe aortic regurgitation    Other general symptoms and signs 04/03/2018    Flu-like symptoms    Personal history of other diseases of the circulatory system     History of hypertension    Personal history of other diseases of the circulatory system     History of cardiac disorder    Personal history of  other diseases of the musculoskeletal system and connective tissue 07/27/2021    History of inflammation of sacroiliac joint    S/P cardiac pacemaker procedure 02/01/2023    S/P coronary artery stent placement 02/01/2023    Severe aortic regurgitation 02/01/2023    Sinus node dysfunction (CMS/HCC) 02/01/2023    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    Tibia fracture 02/01/2023    Urinary tract infection, site not specified 09/21/2017    Acute lower urinary tract infection     Past Surgical History:   Procedure Laterality Date    BLADDER SURGERY  04/06/2017    Bladder Surgery    CARDIAC PACEMAKER PLACEMENT  04/06/2017    Pacemaker Placement    HYSTERECTOMY  04/06/2017    Hysterectomy    OTHER SURGICAL HISTORY  09/21/2017    Total Hip Replacement Left    OTHER SURGICAL HISTORY  04/06/2017    Previous Stent Placement    OTHER SURGICAL HISTORY  03/13/2019    Aortic valve replacement     Family History   Problem Relation Name Age of Onset    Diabetes Mother      Hypertension Brother      Other (Cardiac Disorder) Brother      Liver cancer Brother      Diabetes Mother's Sister      Diabetes Mother's Brother       Social History     Tobacco Use    Smoking status: Never    Smokeless tobacco: Never   Substance Use Topics    Alcohol use: Never    Drug use: Not on file       Physical Exam   ED Triage Vitals [10/08/23 1918]   Temp Heart Rate Resp BP   36.9 °C (98.5 °F) 91 18 165/85      SpO2 Temp Source Heart Rate Source Patient Position   96 % Temporal Monitor --      BP Location FiO2 (%)     -- --       Physical Exam    ED Course & MDM   Diagnoses as of 10/09/23 0037   Chronic bilateral low back pain with bilateral sciatica   Acute on chronic renal insufficiency   Pyelonephritis       Medical Decision Making  We will check her chest x-ray and alter our antibiotic choice if it shows any sort of infiltrative process.  Given the location of her pain and the  findings in her urine it is likely to be more urinary issue and respiratory, but both together could really change the inflammatory markers considerably.    Amount and/or Complexity of Data Reviewed  Labs:  Decision-making details documented in ED Course.  Radiology:  Decision-making details documented in ED Course.        Procedure  Procedures     Mayank Baker MD  10/09/23 0046

## 2023-10-09 NOTE — CARE PLAN
The patient's goals for the shift include pain at 6 or less    The clinical goals for the shift include pt will rate pain at 5 or less    Over the shift, the patient did not make progress toward the following goals. Barriers to progression include not being able to maintain being able to turn comfortably to left side or supine . Recommendations to address these barriers include continue with plan of care to maintain comfort .

## 2023-10-09 NOTE — NURSING NOTE
"Pt. States she is comfortable & pain at a \"2\". Continue to monitor. Son at bedside & updated on pt.s tatus.  "

## 2023-10-09 NOTE — H&P
History Of Present Illness  Shaina Iverson is a 82 y.o. female presenting with a complaint of lower back pain. She has chronic back pain; but yesterday was worse. It hurts to move, breath, she can barely stand. She has been hot than cold. She denies chest pain, SOB, or cough.    In the ED:  Labs: Glu 96; Na 135; K 4.2; BUN 42; Cr 1.95; Lactate 2.5; CRP 21.65;WBC 9.2; Hb 12.6; Hcrt 39.5; ; UA 2+ leukocytes; > 50 WBC; 2+ bacteria;  Radiology: CT L Spine Multilevel degenerative changes of the lumbar spine. No acute  osseous findings. Dextroconvex scoliosis of the lumbar spine is present. Grade 1 anterolisthesis of L4 on L5 and L5 on S1 is present, secondary to adjacent facet arthropathy. Hypertrophic degenerative changes noted about the concave surface of the curvature with multilevel disc space narrowing. Moderate severe vertebral canal narrowing at L4-L5 is present. Moderate vertebral canal narrowing at L3-L4 and L5-S1 is present. Multilevel neural foraminal narrowing noted, worse on the LEFT L2-L3 and L3-L4 and on the RIGHT at L4-L5. CXR negative  Medication: ceftriaxone  Disposition: Admitted to Med/surg     Past Medical History  She has a past medical history of Acute on chronic diastolic (congestive) heart failure (CMS/[facility]) (01/28/2020), Aneurysm of the ascending aorta, without rupture (CMS/[facility]) (01/28/2020), Atherosclerotic heart disease of native coronary artery with other forms of angina pectoris (CMS/[facility]) (11/15/2019), H/O ascending aortic replacement (02/01/2023), Heart failure, unspecified (CMS/[facility]) (08/26/2019), Nonrheumatic aortic (valve) insufficiency (06/23/2022), Other general symptoms and signs (04/03/2018), Personal history of other diseases of the circulatory system, Personal history of other diseases of the circulatory system, Personal history of other diseases of the musculoskeletal system and connective tissue (07/27/2021), S/P cardiac pacemaker procedure (02/01/2023), S/P coronary  artery stent placement (02/01/2023), Severe aortic regurgitation (02/01/2023), Sinus node dysfunction (CMS/HCC) (02/01/2023), 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    Surgical History  She has a past surgical history that includes Other surgical history (09/21/2017); Bladder surgery (04/06/2017); Hysterectomy (04/06/2017); Other surgical history (04/06/2017); Cardiac pacemaker placement (04/06/2017); and Other surgical history (03/13/2019).     Social History  She reports that she has never smoked. She has never used smokeless tobacco. She reports that she does not drink alcohol. No history on file for drug use.    Family History  Family History   Problem Relation Name Age of Onset    Diabetes Mother      Hypertension Brother      Other (Cardiac Disorder) Brother      Liver cancer Brother      Diabetes Mother's Sister      Diabetes Mother's Brother          Allergies  Farxiga [dapagliflozin], Iodides, Iodinated contrast media, Ketorolac, Lisinopril, Nsaids (non-steroidal anti-inflammatory drug), and Shellfish containing products    Review of Systems   Constitutional:  Positive for activity change. Negative for chills and fever.   HENT: Negative.     Eyes: Negative.    Respiratory: Negative.     Cardiovascular: Negative.    Gastrointestinal: Negative.    Endocrine: Negative.    Genitourinary: Negative.    Musculoskeletal:  Positive for back pain and gait problem.   Skin: Negative.    Hematological: Negative.    Psychiatric/Behavioral: Negative.          Physical Exam  Constitutional:       Appearance: Normal appearance.   HENT:      Head: Normocephalic and atraumatic.      Nose: Nose normal.      Mouth/Throat:      Mouth: Mucous membranes are moist.      Pharynx: Oropharynx is clear.   Eyes:      Conjunctiva/sclera: Conjunctivae normal.   Cardiovascular:       Rate and Rhythm: Normal rate and regular rhythm.      Pulses: Normal pulses.      Heart sounds: Normal heart sounds.   Pulmonary:      Effort: Pulmonary effort is normal.      Breath sounds: Normal breath sounds.   Abdominal:      General: Abdomen is flat.      Palpations: Abdomen is soft.   Musculoskeletal:         General: Tenderness present.      Cervical back: Normal range of motion and neck supple.      Comments: Pain in lower back   Skin:     General: Skin is warm and dry.   Neurological:      General: No focal deficit present.      Mental Status: She is alert and oriented to person, place, and time.   Psychiatric:         Mood and Affect: Mood normal.         Behavior: Behavior normal.          Last Recorded Vitals  /60 (BP Location: Right arm, Patient Position: Lying)   Pulse 98   Temp 36.8 °C (98.2 °F) (Temporal)   Resp 22   Wt 60.8 kg (134 lb 0.6 oz)   SpO2 96%     Relevant Results    Scheduled medications  apixaban, 2.5 mg, oral, BID  cefTRIAXone, 2 g, intravenous, Once  clopidogrel, 75 mg, oral, Daily  dapagliflozin propanediol, 10 mg, oral, Daily  ferrous sulfate, 1 tablet, oral, BID  influenza, 0.7 mL, intramuscular, During hospitalization  gabapentin, 300 mg, oral, BID  magnesium oxide, 400 mg, oral, Daily  metoprolol succinate XL, 25 mg, oral, Nightly  pantoprazole, 40 mg, oral, BID  predniSONE, 40 mg, oral, Daily  tiZANidine, 2 mg, oral, TID  valsartan, 40 mg, oral, Daily      Continuous medications     PRN medications  PRN medications: sodium chloride    Results for orders placed or performed during the hospital encounter of 10/08/23 (from the past 24 hour(s))   Urinalysis with Reflex Microscopic and Culture   Result Value Ref Range    Color, Urine Yellow Straw, Yellow    Appearance, Urine Hazy (N) Clear    Specific Gravity, Urine 1.011 1.005 - 1.035    pH, Urine 7.0 5.0, 5.5, 6.0, 6.5, 7.0, 7.5, 8.0    Protein, Urine 100 (2+) (N) NEGATIVE mg/dL    Glucose, Urine NEGATIVE NEGATIVE  mg/dL    Blood, Urine MODERATE (2+) (A) NEGATIVE    Ketones, Urine NEGATIVE NEGATIVE mg/dL    Bilirubin, Urine NEGATIVE NEGATIVE    Urobilinogen, Urine <2.0 <2.0 mg/dL    Nitrite, Urine NEGATIVE NEGATIVE    Leukocyte Esterase, Urine MODERATE (2+) (A) NEGATIVE   Urinalysis Microscopic Only   Result Value Ref Range    WBC, Urine >50 (A) 1-5, NONE /HPF    WBC Clumps, Urine MANY Reference range not established. /HPF    RBC, Urine 6-10 (A) NONE, 1-2, 3-5 /HPF    Squamous Epithelial Cells, Urine 1-9 (SPARSE) Reference range not established. /HPF    Bacteria, Urine 2+ (A) NONE SEEN /HPF    Hyaline Casts, Urine 2+ (A) NONE /LPF    Amorphous Crystals, Urine 1+ NONE, 1+, 2+ /HPF   Comprehensive metabolic panel   Result Value Ref Range    Glucose 96 74 - 99 mg/dL    Sodium 135 (L) 136 - 145 mmol/L    Potassium 4.2 3.5 - 5.3 mmol/L    Chloride 97 (L) 98 - 107 mmol/L    Bicarbonate 27 21 - 32 mmol/L    Anion Gap 15 10 - 20 mmol/L    Urea Nitrogen 42 (H) 6 - 23 mg/dL    Creatinine 1.95 (H) 0.50 - 1.05 mg/dL    eGFR 26 (L) >60 mL/min/1.73m*2    Calcium 9.6 8.6 - 10.3 mg/dL    Albumin 4.1 3.4 - 5.0 g/dL    Alkaline Phosphatase 129 33 - 136 U/L    Total Protein 7.4 6.4 - 8.2 g/dL    AST 16 9 - 39 U/L    Bilirubin, Total 1.0 0.0 - 1.2 mg/dL    ALT 9 7 - 45 U/L   CBC and Auto Differential   Result Value Ref Range    WBC 9.2 4.4 - 11.3 x10*3/uL    nRBC 0.0 0.0 - 0.0 /100 WBCs    RBC 4.24 4.00 - 5.20 x10*6/uL    Hemoglobin 12.6 12.0 - 16.0 g/dL    Hematocrit 39.5 36.0 - 46.0 %    MCV 93 80 - 100 fL    MCH 29.7 26.0 - 34.0 pg    MCHC 31.9 (L) 32.0 - 36.0 g/dL    RDW 14.6 (H) 11.5 - 14.5 %    Platelets 137 (L) 150 - 450 x10*3/uL    MPV 10.2 7.5 - 11.5 fL    Neutrophils % 73.4 40.0 - 80.0 %    Immature Granulocytes %, Automated 0.4 0.0 - 0.9 %    Lymphocytes % 15.3 13.0 - 44.0 %    Monocytes % 9.6 2.0 - 10.0 %    Eosinophils % 1.0 0.0 - 6.0 %    Basophils % 0.3 0.0 - 2.0 %    Neutrophils Absolute 6.74 (H) 1.60 - 5.50 x10*3/uL    Immature  Granulocytes Absolute, Automated 0.04 0.00 - 0.50 x10*3/uL    Lymphocytes Absolute 1.41 0.80 - 3.00 x10*3/uL    Monocytes Absolute 0.88 (H) 0.05 - 0.80 x10*3/uL    Eosinophils Absolute 0.09 0.00 - 0.40 x10*3/uL    Basophils Absolute 0.03 0.00 - 0.10 x10*3/uL   Lactate   Result Value Ref Range    Lactate 2.5 (H) 0.4 - 2.0 mmol/L   C-Reactive Protein   Result Value Ref Range    C-Reactive Protein 21.65 (H) <1.00 mg/dL   Lactate   Result Value Ref Range    Lactate 0.6 0.4 - 2.0 mmol/L   CBC   Result Value Ref Range    WBC 6.8 4.4 - 11.3 x10*3/uL    nRBC 0.0 0.0 - 0.0 /100 WBCs    RBC 3.62 (L) 4.00 - 5.20 x10*6/uL    Hemoglobin 10.6 (L) 12.0 - 16.0 g/dL    Hematocrit 33.5 (L) 36.0 - 46.0 %    MCV 93 80 - 100 fL    MCH 29.3 26.0 - 34.0 pg    MCHC 31.6 (L) 32.0 - 36.0 g/dL    RDW 14.4 11.5 - 14.5 %    Platelets 101 (L) 150 - 450 x10*3/uL    MPV 9.1 7.5 - 11.5 fL   Basic metabolic panel   Result Value Ref Range    Glucose 96 74 - 99 mg/dL    Sodium 133 (L) 136 - 145 mmol/L    Potassium 3.9 3.5 - 5.3 mmol/L    Chloride 101 98 - 107 mmol/L    Bicarbonate 26 21 - 32 mmol/L    Anion Gap 10 10 - 20 mmol/L    Urea Nitrogen 37 (H) 6 - 23 mg/dL    Creatinine 1.66 (H) 0.50 - 1.05 mg/dL    eGFR 31 (L) >60 mL/min/1.73m*2    Calcium 8.8 8.6 - 10.3 mg/dL      XR chest 1 view    Result Date: 10/9/2023  STUDY: Chest Radiograph;  10/9/23 at 12:47am INDICATION: Back pain, cough. COMPARISON: 6/14/23 XR Chest, 5/20/22 XR Chest ACCESSION NUMBER(S): [id number] ORDERING CLINICIAN: LETY MCKEON TECHNIQUE:  Frontal chest was obtained at 00:32 hours. FINDINGS: CARDIOMEDIASTINAL SILHOUETTE: Cardiomediastinal silhouette is enlarged.  Atrial ventricular cardiac leads are demonstrated. LUNGS: Lungs reveal minor atelectasis at the bases. ABDOMEN: No remarkable upper abdominal findings.  BONES: No acute osseous changes.    Minor atelectasis at the bases.  Cardiomegaly Signed by Sergio Turk DO    CT lumbar spine wo IV contrast    Result Date:  10/8/2023  STUDY: CT Lumbar Spine without IV Contrast; 10/08/2023 at 8:06 PM INDICATION: Severe lumbar pain. COMPARISON: CT lumbar spine 05/20/22, 06/10/20. ACCESSION NUMBER(S): [id number] ORDERING CLINICIAN: LETY MCKEON TECHNIQUE:  CT of the lumbar spine was performed without intravenous or intrathecal contrast.  Sagittal and coronal reconstructions were generated.  Automated mA/kV exposure control was utilized and patient examination was performed in strict accordance with principles of ALARA. FINDINGS: There is no fracture or traumatic subluxation.  Dextroconvex scoliosis of the lumbar spine is present.  Grade 1 anterolisthesis of L4 on L5 and L5 on S1 is present, secondary to adjacent facet arthropathy. Hypertrophic degenerative change noted about the concave surface of the curvature with multilevel disc space narrowing.  Broad-based disc bulges noted throughout the lumbar spine with multilevel vertebral canal narrowing.  Moderate to severe vertebral canal narrowing at L4-L5 is present.  Moderate vertebral canal narrowing at L3-L4 and L5-S1 is present.  Multilevel neural foraminal narrowing noted, worse on the LEFT at L2-L3 and L3-L4 and on the at RIGHT L4-L5.  The paravertebral soft tissues are within normal limits.  Atelectatic changes noted within the lower lobes.  Partially imaged low-attenuation lesion RIGHT hepatic lobe noted measuring 3.2 cm AP. This is presumably a cyst or hemangioma but lack of IV contrast limits assessment.  Similar smaller lesion about the medial aspect of the RIGHT hepatic lobe measures 1.4 cm in size.    1. Multilevel degenerative changes of the lumbar spine. No acute osseous findings. 2. Low-attenuation lesions in the liver, likely benign. Follow-up ultrasound can be considered for further assessment of these findings. 3. Dextroconvex scoliosis of the lumbar spine is present. Grade 1 anterolisthesis of L4 on L5 and L5 on S1 is present, secondary to adjacent facet  arthropathy. Hypertrophic degenerative changes noted about the concave surface of the curvature with multilevel disc space narrowing. Moderate severe vertebral canal narrowing at L4-L5 is present. Moderate vertebral canal narrowing at L3-L4 and L5-S1 is present. Multilevel neural foraminal narrowing noted, worse on the LEFT L2-L3 and L3-L4 and on the RIGHT at L4-L5. Signed by Abe De La Garza II, MD               Assessment/Plan   Principal Problem:    Chronic bilateral low back pain with bilateral sciatica  Active Problems:    Back pain of lumbar region with sciatica  UTI    Acute on Chronic Lower Back pain  - CT L Spine: negative for fracture; multiple degenerative changes  - Bone scan pending  - continue gabapentin  - started prednisone, tizanidine  - started acetaminophen scheduled  - started oxycodone prn   - PT/OT evaluation    UTI  - UA 2+ leukocytes; >50 WBC; 2+ bacteria  - Urine culture pending  - given ceftriaxone in the ED  - continue ceftriaxone    Chronic Atrial Fibrillation  CAD  Essential HTN  - continue apixaban, clopidogrel, dapagliflozin,metoprolol succinate, valsartan  - monitor BP and HR    Iron Deficiency Anemia  - continue ferrous sulfate    GERD  - continue pantoprazole    DVT ppx  - on apixaban    Code Status: Full    Disposition: Patient requires more than 2 inpatient days.    Total accumulated time spent face to face and not face to face preparing to see the patient, obtaining and reviewing separately obtained history; performing a medically appropriate examination and/or evaluation; counseling and educating the patient, family; ordering medications, tests, or procedures; referring and communicating with other health care professionals; documenting clinical information in the patient's medical record; independently interpreting results and communicating the results to the patient, family; and care coordination was 60 minutes.                Lizzeth Houston, APRN-CNP

## 2023-10-09 NOTE — NURSING NOTE
Pt. Repositioned as tolerated for pain control. Pt. Tolerating only minor adjustments to  bed for comfort. Pt. Declining to be turned to sides for comfort.

## 2023-10-10 ENCOUNTER — APPOINTMENT (OUTPATIENT)
Dept: RADIOLOGY | Facility: HOSPITAL | Age: 83
End: 2023-10-10
Payer: MEDICARE

## 2023-10-10 ENCOUNTER — HOSPITAL ENCOUNTER (OUTPATIENT)
Dept: RADIOLOGY | Facility: HOSPITAL | Age: 83
Setting detail: OBSERVATION
Discharge: HOME | End: 2023-10-10
Payer: MEDICARE

## 2023-10-10 DIAGNOSIS — M54.50 LOWER BACK PAIN: ICD-10-CM

## 2023-10-10 LAB
ANION GAP SERPL CALC-SCNC: 16 MMOL/L (ref 10–20)
BUN SERPL-MCNC: 40 MG/DL (ref 6–23)
CALCIUM SERPL-MCNC: 9 MG/DL (ref 8.6–10.3)
CHLORIDE SERPL-SCNC: 99 MMOL/L (ref 98–107)
CO2 SERPL-SCNC: 23 MMOL/L (ref 21–32)
CREAT SERPL-MCNC: 1.6 MG/DL (ref 0.5–1.05)
ERYTHROCYTE [DISTWIDTH] IN BLOOD BY AUTOMATED COUNT: 14.2 % (ref 11.5–14.5)
GFR SERPL CREATININE-BSD FRML MDRD: 32 ML/MIN/1.73M*2
GLUCOSE SERPL-MCNC: 124 MG/DL (ref 74–99)
HCT VFR BLD AUTO: 32.2 % (ref 36–46)
HGB BLD-MCNC: 10.3 G/DL (ref 12–16)
MCH RBC QN AUTO: 29.4 PG (ref 26–34)
MCHC RBC AUTO-ENTMCNC: 32 G/DL (ref 32–36)
MCV RBC AUTO: 92 FL (ref 80–100)
NRBC BLD-RTO: 0 /100 WBCS (ref 0–0)
PLATELET # BLD AUTO: 139 X10*3/UL (ref 150–450)
PMV BLD AUTO: 9.9 FL (ref 7.5–11.5)
POTASSIUM SERPL-SCNC: 4.2 MMOL/L (ref 3.5–5.3)
RBC # BLD AUTO: 3.5 X10*6/UL (ref 4–5.2)
SODIUM SERPL-SCNC: 134 MMOL/L (ref 136–145)
WBC # BLD AUTO: 5.5 X10*3/UL (ref 4.4–11.3)

## 2023-10-10 PROCEDURE — 85027 COMPLETE CBC AUTOMATED: CPT | Performed by: NURSE PRACTITIONER

## 2023-10-10 PROCEDURE — 78315 BONE IMAGING 3 PHASE: CPT | Performed by: STUDENT IN AN ORGANIZED HEALTH CARE EDUCATION/TRAINING PROGRAM

## 2023-10-10 PROCEDURE — 99233 SBSQ HOSP IP/OBS HIGH 50: CPT | Performed by: NURSE PRACTITIONER

## 2023-10-10 PROCEDURE — 2500000004 HC RX 250 GENERAL PHARMACY W/ HCPCS (ALT 636 FOR OP/ED): Performed by: NURSE PRACTITIONER

## 2023-10-10 PROCEDURE — 97161 PT EVAL LOW COMPLEX 20 MIN: CPT | Mod: GP | Performed by: PHYSICAL THERAPIST

## 2023-10-10 PROCEDURE — A9503 TC99M MEDRONATE: HCPCS | Performed by: INTERNAL MEDICINE

## 2023-10-10 PROCEDURE — 74176 CT ABD & PELVIS W/O CONTRAST: CPT | Performed by: STUDENT IN AN ORGANIZED HEALTH CARE EDUCATION/TRAINING PROGRAM

## 2023-10-10 PROCEDURE — 74176 CT ABD & PELVIS W/O CONTRAST: CPT | Mod: ME

## 2023-10-10 PROCEDURE — 3430000001 HC RX 343 DIAGNOSTIC RADIOPHARMACEUTICALS

## 2023-10-10 PROCEDURE — G0378 HOSPITAL OBSERVATION PER HR: HCPCS

## 2023-10-10 PROCEDURE — 2500000001 HC RX 250 WO HCPCS SELF ADMINISTERED DRUGS (ALT 637 FOR MEDICARE OP): Performed by: NURSE PRACTITIONER

## 2023-10-10 PROCEDURE — 97535 SELF CARE MNGMENT TRAINING: CPT | Mod: GO

## 2023-10-10 PROCEDURE — 3430000001 HC RX 343 DIAGNOSTIC RADIOPHARMACEUTICALS: Performed by: INTERNAL MEDICINE

## 2023-10-10 PROCEDURE — 36415 COLL VENOUS BLD VENIPUNCTURE: CPT | Performed by: NURSE PRACTITIONER

## 2023-10-10 PROCEDURE — 80048 BASIC METABOLIC PNL TOTAL CA: CPT | Performed by: NURSE PRACTITIONER

## 2023-10-10 PROCEDURE — A9503 TC99M MEDRONATE: HCPCS

## 2023-10-10 PROCEDURE — G1004 CDSM NDSC: HCPCS

## 2023-10-10 PROCEDURE — 97166 OT EVAL MOD COMPLEX 45 MIN: CPT | Mod: GO

## 2023-10-10 RX ORDER — CEFTRIAXONE 1 G/50ML
1 INJECTION, SOLUTION INTRAVENOUS EVERY 24 HOURS
Status: DISCONTINUED | OUTPATIENT
Start: 2023-10-10 | End: 2023-10-11 | Stop reason: HOSPADM

## 2023-10-10 RX ORDER — TC 99M MEDRONATE 20 MG/10ML
25 INJECTION, POWDER, LYOPHILIZED, FOR SOLUTION INTRAVENOUS
Status: COMPLETED | OUTPATIENT
Start: 2023-10-10 | End: 2023-10-10

## 2023-10-10 RX ADMIN — GABAPENTIN 300 MG: 300 CAPSULE ORAL at 09:00

## 2023-10-10 RX ADMIN — CEFTRIAXONE 1 G: 1 INJECTION, SOLUTION INTRAVENOUS at 20:06

## 2023-10-10 RX ADMIN — TIZANIDINE 2 MG: 4 TABLET ORAL at 08:43

## 2023-10-10 RX ADMIN — ACETAMINOPHEN 975 MG: 325 TABLET ORAL at 11:25

## 2023-10-10 RX ADMIN — VALSARTAN 40 MG: 80 TABLET, FILM COATED ORAL at 08:47

## 2023-10-10 RX ADMIN — ACETAMINOPHEN 975 MG: 325 TABLET ORAL at 02:35

## 2023-10-10 RX ADMIN — FERROUS SULFATE TAB 325 MG (65 MG ELEMENTAL FE) 325 MG: 325 (65 FE) TAB at 20:33

## 2023-10-10 RX ADMIN — GABAPENTIN 300 MG: 300 CAPSULE ORAL at 20:33

## 2023-10-10 RX ADMIN — ACETAMINOPHEN 975 MG: 325 TABLET ORAL at 18:39

## 2023-10-10 RX ADMIN — CLOPIDOGREL BISULFATE 75 MG: 75 TABLET ORAL at 08:50

## 2023-10-10 RX ADMIN — TC 99M MEDRONATE 25 MILLICURIE: 20 INJECTION, POWDER, LYOPHILIZED, FOR SOLUTION INTRAVENOUS at 09:10

## 2023-10-10 RX ADMIN — PANTOPRAZOLE SODIUM 40 MG: 40 TABLET, DELAYED RELEASE ORAL at 08:42

## 2023-10-10 RX ADMIN — PREDNISONE 40 MG: 20 TABLET ORAL at 08:50

## 2023-10-10 RX ADMIN — METOPROLOL SUCCINATE 25 MG: 25 TABLET, FILM COATED, EXTENDED RELEASE ORAL at 20:32

## 2023-10-10 RX ADMIN — APIXABAN 2.5 MG: 2.5 TABLET, FILM COATED ORAL at 20:33

## 2023-10-10 RX ADMIN — PANTOPRAZOLE SODIUM 40 MG: 40 TABLET, DELAYED RELEASE ORAL at 20:33

## 2023-10-10 RX ADMIN — DAPAGLIFLOZIN 10 MG: 10 TABLET, FILM COATED ORAL at 08:42

## 2023-10-10 RX ADMIN — APIXABAN 2.5 MG: 2.5 TABLET, FILM COATED ORAL at 08:42

## 2023-10-10 RX ADMIN — FERROUS SULFATE TAB 325 MG (65 MG ELEMENTAL FE) 325 MG: 325 (65 FE) TAB at 08:49

## 2023-10-10 RX ADMIN — Medication 400 MG: at 08:49

## 2023-10-10 ASSESSMENT — COGNITIVE AND FUNCTIONAL STATUS - GENERAL
HELP NEEDED FOR BATHING: A LITTLE
CLIMB 3 TO 5 STEPS WITH RAILING: A LOT
DRESSING REGULAR UPPER BODY CLOTHING: A LITTLE
STANDING UP FROM CHAIR USING ARMS: A LITTLE
TURNING FROM BACK TO SIDE WHILE IN FLAT BAD: A LITTLE
MOBILITY SCORE: 17
WALKING IN HOSPITAL ROOM: A LITTLE
PERSONAL GROOMING: A LITTLE
DAILY ACTIVITIY SCORE: 16
MOBILITY SCORE: 14
MOVING TO AND FROM BED TO CHAIR: A LITTLE
MOVING FROM LYING ON BACK TO SITTING ON SIDE OF FLAT BED WITH BEDRAILS: A LITTLE
DAILY ACTIVITIY SCORE: 19
DRESSING REGULAR LOWER BODY CLOTHING: A LOT
TURNING FROM BACK TO SIDE WHILE IN FLAT BAD: A LITTLE
WALKING IN HOSPITAL ROOM: A LITTLE
DRESSING REGULAR LOWER BODY CLOTHING: A LITTLE
MOVING FROM LYING ON BACK TO SITTING ON SIDE OF FLAT BED WITH BEDRAILS: A LITTLE
TOILETING: A LITTLE
TOILETING: A LOT
DAILY ACTIVITIY SCORE: 19
HELP NEEDED FOR BATHING: A LOT
PERSONAL GROOMING: A LITTLE
HELP NEEDED FOR BATHING: A LITTLE
PERSONAL GROOMING: A LITTLE
STANDING UP FROM CHAIR USING ARMS: A LOT
WALKING IN HOSPITAL ROOM: A LOT
MOVING TO AND FROM BED TO CHAIR: A LOT
MOBILITY SCORE: 17
CLIMB 3 TO 5 STEPS WITH RAILING: A LOT
DRESSING REGULAR UPPER BODY CLOTHING: A LITTLE
MOVING TO AND FROM BED TO CHAIR: A LITTLE
STANDING UP FROM CHAIR USING ARMS: A LITTLE
TOILETING: A LITTLE
CLIMB 3 TO 5 STEPS WITH RAILING: A LOT
DRESSING REGULAR UPPER BODY CLOTHING: A LITTLE
TURNING FROM BACK TO SIDE WHILE IN FLAT BAD: A LITTLE
MOVING FROM LYING ON BACK TO SITTING ON SIDE OF FLAT BED WITH BEDRAILS: A LITTLE
DRESSING REGULAR LOWER BODY CLOTHING: A LITTLE

## 2023-10-10 ASSESSMENT — ACTIVITIES OF DAILY LIVING (ADL)
BATHING_LEVEL_OF_ASSISTANCE: MAXIMUM ASSISTANCE
BATHING_ASSISTANCE: MAXIMAL
BATHING_WHERE_ASSESSED: EDGE OF BED
HOME_MANAGEMENT_TIME_ENTRY: 25

## 2023-10-10 ASSESSMENT — PAIN SCALES - PAIN ASSESSMENT IN ADVANCED DEMENTIA (PAINAD)
CONSOLABILITY: NO NEED TO CONSOLE
FACIALEXPRESSION: SMILING OR INEXPRESSIVE

## 2023-10-10 ASSESSMENT — PAIN SCALES - GENERAL
PAINLEVEL_OUTOF10: 0 - NO PAIN
PAINLEVEL_OUTOF10: 0 - NO PAIN
PAINLEVEL_OUTOF10: 2
PAINLEVEL_OUTOF10: 5 - MODERATE PAIN
PAINLEVEL_OUTOF10: 0 - NO PAIN

## 2023-10-10 ASSESSMENT — PAIN - FUNCTIONAL ASSESSMENT
PAIN_FUNCTIONAL_ASSESSMENT: 0-10

## 2023-10-10 ASSESSMENT — PAIN DESCRIPTION - DESCRIPTORS
DESCRIPTORS: ACHING
DESCRIPTORS: ACHING

## 2023-10-10 ASSESSMENT — PAIN SCALES - WONG BAKER: WONGBAKER_NUMERICALRESPONSE: NO HURT

## 2023-10-10 NOTE — CARE PLAN
Problem: Balance  Goal: STG - Maintains dynamic standing balance without upper extremity support    Outcome: Progressing     Problem: Mobility  Goal: LTG - Patient will ambulate household distance  Outcome: Progressing     Problem: Transfers  Goal: STG - Patient will perform bed mobility IND  Outcome: Progressing     Problem: Transfers  Goal: STG - Patient will transfer sit to and from stand IND  Outcome: Progressing

## 2023-10-10 NOTE — PROGRESS NOTES
Occupational Therapy    Evaluation/Treatment    Patient Name: Shaina Iverson  MRN: [id number]  : 1940  Today's Date: 10/10/23      Assessment:  OT Assessment: The pt. displays a decline from her prior level of functioning for self care skills & functional transfers due to her hospitalization for low back apin with B sciatica acute on renal insufficiency & pyelonephritis.  OT intervention is indicated to address the pt.'s deficit areas to restore the pt. to her previous level of functioning.  Prognosis: Good  Barriers to Discharge: Pain, comorbidities  Evaluation/Treatment Tolerance: Patient limited by pain  OT Assessment Results: Decreased ADL status, Decreased upper extremity range of motion, Decreased endurance, Decreased functional mobility  Prognosis: Good  Barriers to Discharge:  (Pain, comorbidities)  Evaluation/Treatment Tolerance: Patient limited by pain  Barriers to Participation: Comorbidities  Plan:  Treatment Interventions: ADL retraining, Patient/family training, Functional transfer training  OT Frequency: 4 times per week  OT Discharge Recommendations: Moderate intensity level of continued care  OT Recommended Transfer Status: Assist of 1  Treatment Interventions: ADL retraining, Patient/family training, Functional transfer training    Subjective   Current Problem:  1. Chronic bilateral low back pain with bilateral sciatica        2. Acute on chronic renal insufficiency        3. Pyelonephritis          General:   OT Received On: 10/10/23  General  Reason for Referral: Impaired self care skills & functional transfers  Referred By: Lizzeth Houston CNP  Past Medical History Relevant to Rehab: CHF, aneurysm of ascending aorta, severe aortic regurgitation, pacemaker, coronary artery stent, tibia fx, bladder surgery, aortic valve replacement, L HAN  Family/Caregiver Present: No  Prior to Session Communication: Bedside nurse  Preferred Learning Style: auditory, verbal, visual  Precautions:  Medical  Precautions: Cardiac precautions, Fall precautions.Spine precautions observed due to the pt.'s c/o back pain.  Vital Signs:  Heart Rate: 75  Heart Rate Source: Monitor  SpO2: 96 %  BP: 158/83  BP Location: Left arm  Patient Position: Lying  Pain:  Pain Assessment  Pain Assessment: 0-10  Pain Score: 5 - Moderate pain  Pain Location:  (Back with the pt. reporting primarily on her L side.)  Pain Descriptors: Aching  Pain Interventions: Repositioned (Pt.'s nurse was notified of the pt.'s request for pain medication.)    Objective   Cognition:  Overall Cognitive Status: Within Functional Limits  Attention: Within Functional Limits  Memory: Within Funtional Limits  Safety/Judgement: Min to mod cues for safety during transfers.       Home Living:  Lives With: Her son  Home Adaptive Equipment: rollator, FWW, adjustable bed, raised toilet seat with arms, built in shower chair, BSC, reacher, sock aid, grab bars in the shower  Home Layout: One level  Home Access: Ramped entrance  Bathroom Shower/Tub: Walk-in shower  Bathroom Toilet: Raised toilet seat with arms  Prior Function:  Level of [place]: Independent with ADLs and functional transfers  Receives Help From: Pt.'s son prepares the meals & she has hired help to do the laundry.  Homemaking Assistance: Needs assistance  Driving/Transportation: Pt. has her license, however does not drive.  Leisure: Pt. likes to read & gloria.  Hand Dominance: Right  Prior Function Comments: Pt.'s son is home with her during the day, however he is an uber  in the evening when she is settled in bed.    ADL:  Equipment: Reacher, Sock aid, Long-handled sponge  Eating Assistance: Independent  Grooming Assistance: Close SBA with set up  Bathing Assistance: Maximal  UE Dressing Assistance: Minimal  UE Dressing Deficit: Daniella to assist with donning a [facility] gown)  LE Dressing Assistance: Maximal  LE Dressing Deficit: Don/doff R sock, Don/doff L sock, Verbal cueing, Steadying, Setup,  Thread RLE into pants, Thread LLE into pants, Thread RLE into underwear, Thread LLE into underwear, Pull up over hips  Toileting Assistance with Device: Maximal  Toileting Deficit: Steadying, Verbal cueing, Perineal hygiene, Clothing management up, Clothing management down    Bed Mobility/Transfers: Bed Mobility  Bed Mobility:modA & cues for supine to sit  Balance:CGA/Chandra & cues for static standing balance with B UE support.  Transfer 1  Technique 1: Stand pivot, Sit to stand  Transfer Device 1: Walker  Transfer Level of Assistance 1: Moderate assistance  Trials/Comments 1: Pt. displayed difficulty with transitional movements.  Transfers 2  Technique 2: Sit to stand, Stand pivot  Transfer Device 2: Walker  Transfer Level of Assistance 2: Moderate assistance    Vision:WFL.  The pt. wears glasses for reading.    Strength:  Strength Comments: Distal R UE strength is grossly G-, L UE distal strength is grossly F+     Hand Function  Coordination: Arthritic changes B hands/digits  Extremities: RUE   RUE : Within Functional Limits and LUE   LUE: Exceptions to WFL  LUE AROM (degrees)  LUE AROM Comment: Crepitation with L UE ROM  L Shoulder Flexion  0-170: 40 Degrees  L Shoulder ABduction 0-160/180: 20 Degrees    Outcome Measures: [facility] Daily Activity  Putting on and taking off regular lower body clothing: A lot  Bathing (including washing, rinsing, drying): A lot  Putting on and taking off regular upper body clothing: A little  Toileting, which includes using toilet, bedpan or urinal: A lot  Taking care of personal grooming such as brushing teeth: A little  Eating Meals: None  Daily Activity - Total Score: 16      Education Documentation  Precautions, taught by Becki Rudolph OT at 10/10/2023 12:13 PM.  Learner: Patient  Readiness: Acceptance  Method: Explanation, Demonstration  Response: Verbalizes Understanding    ADL Training, taught by Becki Rudolph OT at 10/10/2023 12:13 PM.  Learner: Patient  Readiness:  Acceptance  Method: Explanation, Demonstration  Response: Verbalizes Understanding    Education Comments  No comments found.      OP EDUCATION:  Education  Individual(s) Educated: Patient  Education Provided: Fall precautons, POC discussed and agreed upon (compensatory techniques for self care)  Diagnosis and Precautions:Instructed the pt. on use of spine precautions due to her c/o back pain at this time.  Risk and Benefits Discussed with Patient/Caregiver/Other: yes  Patient/Caregiver Demonstrated Understanding: yes  Patient Response to Education: Patient/Caregiver Verbalized Understanding of Information    Goals:  Encounter Problems       Encounter Problems (Active)       ADLs       OT GOAL 1 (Progressing)       Start:  10/10/23    Expected End:  10/23/23       Close S toileting with A/safety devices         OT GOAL 2 (Progressing)       Start:  10/10/23    Expected End:  10/23/23       Close SBA LE bathing with A/safety devices.         OT GOAL 3 (Progressing)       Start:  10/10/23    Expected End:  10/23/23       Close S LE dressing with use of adaptive equipment as indicated            BALANCE        Close S standing balance as needed for self care performance with A device.      OT GOAL 1 (Progressing)       Start:  10/10/23    Expected End:  10/23/23               Balance       STG - Maintains dynamic standing balance without upper extremity support (Progressing)       Start:  10/10/23    Expected End:  10/24/23       INTERVENTIONS:  1. Practice standing with minimal support.  2. Educate patient about standing tolerance.  3. Educate patient about independence with gait, transfers, and ADL's.  4. Educate patient about use of assistive device.  5. Educate patient about self-directed care.            MOBILITY       Modified independent bed mobility with A device as indicated      OT GOAL 1 (Progressing)       Start:  10/10/23    Expected End:  10/23/23               Mobility       LTG - Patient will ambulate  household distance (Progressing)       Start:  10/10/23    Expected End:  10/24/23               TRANSFERS       Close S & good safety with transfers to the bed, chair & toilet with A/safety devices.      OT GOAL 1 (Progressing)       Start:  10/10/23    Expected End:  10/23/23               Transfers       STG - Patient will perform bed mobility IND (Progressing)       Start:  10/10/23    Expected End:  10/24/23            STG - Patient will transfer sit to and from stand IND (Progressing)       Start:  10/10/23    Expected End:  10/24/23

## 2023-10-10 NOTE — CARE PLAN
Problem: ADLs  Goal: OT GOAL 1  Description: Close S toileting with A/safety devices  Outcome: Progressing  Goal: OT GOAL 2  Description: Close SBA LE bathing with A/safety devices.  Outcome: Progressing  Goal: OT GOAL 3  Description: Close S LE dressing with use of adaptive equipment as indicated  Outcome: Progressing     Problem: TRANSFERS  Description: Close S & good safety with transfers to the bed, chair & toilet with A/safety devices.  Goal: OT GOAL 1  Outcome: Progressing     Problem: MOBILITY  Description: Modified independent bed mobility with A device as indicated  Goal: OT GOAL 1  Outcome: Progressing     Problem: BALANCE  Description:  Close S standing balance as needed for self care performance with A device.  Goal: OT GOAL 1  Outcome: Progressing

## 2023-10-10 NOTE — CARE PLAN
Problem: ADLs  Goal: S toileting with A/safety devices  Outcome: Progressing  Goal: Close SBA LE bathing with A/safety devices.  Outcome: Progressing  Goal: Close SBA LE dressing with adaptive equipment as indicated.  Outcome: Progressing     Problem: TRANSFERS  Goal: Close S transfers to the bed, chair & toilet with A/safety devices.  Outcome: Progressing     Problem: MOBILITY  Goal: S bed mobility as needed to participate in ADL's.  Outcome: Progressing     Problem: BALANCE  Goal: Close SBA standing balance as needed  for self care performance with A device.  Outcome: Progressing

## 2023-10-10 NOTE — CARE PLAN
The patient's goals for the shift include pain at 6 or less    The clinical goals for the shift include pt will rate pain at 5 or less    Over the shift, the patient did well with pain control & able to sit in chair & use BSC most of the shift without difficulty using a walker.

## 2023-10-10 NOTE — PROGRESS NOTES
Discussed with patient the MOD recommendation from therapy.  She does not want to go to rehab.  She would prefer [facility]-she said she has had [facility] in the past and would like them.  Sent message to provider regarding needing order for [facility].

## 2023-10-10 NOTE — PROGRESS NOTES
Physical Therapy    Physical Therapy Evaluation    Patient Name: Shaina Iverson  MRN: [id number]  Today's Date: 10/10/2023   Time Calculation  Start Time: 1020  Stop Time: 1040  Time Calculation (min): 20 min    Assessment/Plan   PT Assessment  PT Assessment Results: Decreased strength, Decreased endurance, Decreased mobility, Pain  Rehab Prognosis: Good  Strengths: Housing layout, Support of Caregivers  End of Session Communication: Bedside nurse  Assessment Comment: Pleasant 82 y.o presents with pain and weakness. Pt. lives with her son and is normally IND with amb. (uses furniture to amb.)  Pt. currently requires CGA for amb. and safety and would benefit from additional PT to address above noted limitaions and prevent further decline.  End of Session Patient Position: Up in chair  IP OR SWING BED PT PLAN  Inpatient or Swing Bed: Inpatient  PT Plan  PT Plan: Skilled PT  PT Frequency: 4 times per week  PT Discharge Recommendations: Moderate intensity level of continued care      Subjective   General Visit Information:  General  Reason for Referral: weakness; LBP  Referred By: Lizzeth Houston CNP  Past Medical History Relevant to Rehab: CHF, aneurysm of ascending aorta, severe aortic regurgitation, pacemaker, coronary artery stent, tibia fx, bladder surgery, aortic valve replacement, L HAN  Family/Caregiver Present: Yes  Prior to Session Communication: Bedside nurse  Home Living:  Home Living  Home Layout: One level  Home Access: Ramped entrance  Bathroom Shower/Tub: Walk-in shower  Bathroom Toilet: Handicapped height  Bathroom Equipment: Grab bars in shower (shower chair)  Prior Level of Function:  Prior Function Per Pt/Caregiver Report  Level of [place]:  (IND with transfers, bed mobility and amb. (states she furniture walks). IND with dressing, tolieting. Son does the cooking/cleaning. Has someone nearby with bathing)  Receives Help From:  (son and grandchildren)  Prior Function Comments: son is home  during the day  Precautions:  Precautions  Medical Precautions: Fall precautions  Vital Signs:  Vital Signs  Heart Rate: 97  SpO2: 94 %  BP: 176/74    Objective   Pain:  Pain Assessment  Pain Assessment: 0-10  Pain Score:  (0 at rest; 6//10 in L hip with mobility)  Pain Type: Chronic pain  Cognition:  Cognition  Overall Cognitive Status: Within Functional Limits (could not recall year)    General Assessments:      Pt. In supine with son present. Tele, pulse ox, cleared for PT       Static Sitting Balance  Static Sitting-Balance Support: No upper extremity supported  Dynamic Sitting Balance  Dynamic Sitting-Balance Support: No upper extremity supported    Static Standing Balance  Static Standing-Balance Support: Bilateral upper extremity supported  Static Standing-Level of Assistance: Contact guard  Dynamic Standing Balance  Dynamic Standing-Balance Support: Bilateral upper extremity supported  Functional Assessments:       Transfers  Transfer: Yes  Transfer 1  Technique 1: Sit to stand  Transfer Device 1: Walker  Transfer Level of Assistance 1: Contact guard  Transfers 2  Transfer From 2: Toilet to (stand)  Technique 2: Stand to sit  Transfer Device 2: Walker  Transfer Level of Assistance 2: Minimum assistance    Ambulation/Gait Training  Ambulation/Gait Training Performed: Yes  Ambulation/Gait Training 1  Surface 1: Level tile  Device 1: Rolling walker  Assistance 1: Contact guard  Quality of Gait 1: Diminished heel strike (decreased gait speed (pt. states this is not her norm))  Comments/Distance (ft) 1: 3' 6'  Extremity/Trunk Assessments:  Defer to OT for UE detail   BLE ROM WFL  BLE: grossly 4-/5  Outcome Measures:  [facility] Basic Mobility  Turning from your back to your side while in a flat bed without using bedrails: A little  Moving from lying on your back to sitting on the side of a flat bed without using bedrails: A little  Moving to and from bed to chair (including a wheelchair): A little  Standing up from a  chair using your arms (e.g. wheelchair or bedside chair): A little  To walk in hospital room: A little  Climbing 3-5 steps with railing: A lot  Basic Mobility - Total Score: 17    Encounter Problems       Encounter Problems (Active)       Balance       STG - Maintains dynamic standing balance without upper extremity support (Progressing)       Start:  10/10/23    Expected End:  10/24/23       INTERVENTIONS:  1. Practice standing with minimal support.  2. Educate patient about standing tolerance.  3. Educate patient about independence with gait, transfers, and ADL's.  4. Educate patient about use of assistive device.  5. Educate patient about self-directed care.            Mobility       LTG - Patient will ambulate household distance (Progressing)       Start:  10/10/23    Expected End:  10/24/23               Pain          Pain - Adult          Transfers       STG - Patient will perform bed mobility IND (Progressing)       Start:  10/10/23    Expected End:  10/24/23            STG - Patient will transfer sit to and from stand IND (Progressing)       Start:  10/10/23    Expected End:  10/24/23                   Education Documentation  Precautions, taught by Sepideh Coburn PT at 10/10/2023 10:59 AM.  Learner: Patient  Readiness: Acceptance  Method: Explanation  Response: Verbalizes Understanding, Needs Reinforcement  Comment: Reommend use of WW for support    Education Comments  No comments found.

## 2023-10-10 NOTE — CARE PLAN
The patient's goals for the shift include pain at 6 or less    The clinical goals for the shift include pt will rate pain at 5 or less    Over the shift, the patient did not c/o of back pain but we kept up on scheduled tylenol and repositioning this shift. Purwick in place for incontinence this shift. Patient receiving ATB therapy for UTI and tolerated, afebrile. Patient was able to get rest intermittently this shift. Patient became confused regarding when she got to the hospital and how long she has been here for. She is alert and oriented however. She was confused about when she got to the ED, remembers calling EMS, but does not recall when she got upstairs and into a room. RN re-oriented patient on when she was in the ED, when she was admitted upstairs, and what she will have happening during day shift, such as a bone density scan, and atb therapy for her UTI. Pt. [place] safe and unafraid. Patient was able to rest after this and did well during the shift.

## 2023-10-10 NOTE — PROGRESS NOTES
Shaina Iverson is a 82 y.o. female on day 0 of admission presenting with Chronic bilateral low back pain with bilateral sciatica.      Subjective   Patient denies burning with urination, n/v constipation, diarrhea, fever or chills.    She states she is eating well and her pain is controlled today.     Objective     Last Recorded Vitals  /83 (BP Location: Left arm, Patient Position: Lying)   Pulse 75   Temp 36.2 °C (97.2 °F) (Temporal)   Resp 15   Wt 60.8 kg (134 lb 0.6 oz)   SpO2 96%   Intake/Output last 3 Shifts:    Intake/Output Summary (Last 24 hours) at 10/10/2023 1014  Last data filed at 10/10/2023 0900  Gross per 24 hour   Intake 1120.18 ml   Output 1100 ml   Net 20.18 ml       Admission Weight  Weight: 59 kg (130 lb) (10/08/23 1918)    Daily Weight  10/09/23 : 60.8 kg (134 lb 0.6 oz)    Image Results  XR chest 1 view  Narrative: STUDY:  Chest Radiograph;  10/9/23 at 12:47am  INDICATION:  Back pain, cough.  COMPARISON:  6/14/23 XR Chest, 5/20/22 XR Chest  ACCESSION NUMBER(S):  [id number]  ORDERING CLINICIAN:  LETY MCKEON  TECHNIQUE:  Frontal chest was obtained at 00:32 hours.  FINDINGS:  CARDIOMEDIASTINAL SILHOUETTE:  Cardiomediastinal silhouette is enlarged.  Atrial ventricular cardiac  leads are demonstrated.  LUNGS:  Lungs reveal minor atelectasis at the bases.  ABDOMEN:  No remarkable upper abdominal findings.     BONES:  No acute osseous changes.  Impression: Minor atelectasis at the bases.  Cardiomegaly  Signed by Sergio Turk DO      Physical Exam  Constitutional:       Appearance: Normal appearance.   HENT:      Head: Normocephalic.      Nose: Nose normal.      Mouth/Throat:      Mouth: Mucous membranes are moist.   Eyes:      Pupils: Pupils are equal, round, and reactive to light.   Cardiovascular:      Rate and Rhythm: Normal rate. Rhythm irregular.      Pulses: Normal pulses.   Pulmonary:      Effort: Pulmonary effort is normal.      Breath sounds: Normal breath sounds.    Abdominal:      General: Bowel sounds are normal.      Palpations: Abdomen is soft.   Musculoskeletal:      Cervical back: Normal range of motion and neck supple.      Comments: 5/5 sharon upper extremities   4/5 sharon lower extremities - restricted by pain    Skin:     General: Skin is warm and dry.      Capillary Refill: Capillary refill takes 2 to 3 seconds.   Neurological:      General: No focal deficit present.      Mental Status: She is alert.      Motor: Weakness present.   Psychiatric:         Mood and Affect: Mood normal.         Behavior: Behavior normal.         Thought Content: Thought content normal.         Judgment: Judgment normal.         Relevant Results    Scheduled medications  acetaminophen, 975 mg, oral, q8h  apixaban, 2.5 mg, oral, BID  clopidogrel, 75 mg, oral, Daily  dapagliflozin propanediol, 10 mg, oral, Daily  ferrous sulfate, 1 tablet, oral, BID  influenza, 0.7 mL, intramuscular, During hospitalization  gabapentin, 300 mg, oral, BID  magnesium oxide, 400 mg, oral, Daily  metoprolol succinate XL, 25 mg, oral, Nightly  pantoprazole, 40 mg, oral, BID  predniSONE, 40 mg, oral, Daily  tiZANidine, 2 mg, oral, Daily  valsartan, 40 mg, oral, Daily      Results for orders placed or performed during the hospital encounter of 10/08/23 (from the past 24 hour(s))   Basic Metabolic Panel   Result Value Ref Range    Glucose 124 (H) 74 - 99 mg/dL    Sodium 134 (L) 136 - 145 mmol/L    Potassium 4.2 3.5 - 5.3 mmol/L    Chloride 99 98 - 107 mmol/L    Bicarbonate 23 21 - 32 mmol/L    Anion Gap 16 10 - 20 mmol/L    Urea Nitrogen 40 (H) 6 - 23 mg/dL    Creatinine 1.60 (H) 0.50 - 1.05 mg/dL    eGFR 32 (L) >60 mL/min/1.73m*2    Calcium 9.0 8.6 - 10.3 mg/dL   CBC   Result Value Ref Range    WBC 5.5 4.4 - 11.3 x10*3/uL    nRBC 0.0 0.0 - 0.0 /100 WBCs    RBC 3.50 (L) 4.00 - 5.20 x10*6/uL    Hemoglobin 10.3 (L) 12.0 - 16.0 g/dL    Hematocrit 32.2 (L) 36.0 - 46.0 %    MCV 92 80 - 100 fL    MCH 29.4 26.0 - 34.0 pg     MCHC 32.0 32.0 - 36.0 g/dL    RDW 14.2 11.5 - 14.5 %    Platelets 139 (L) 150 - 450 x10*3/uL    MPV 9.9 7.5 - 11.5 fL        XR chest 1 view    Result Date: 10/9/2023  STUDY: Chest Radiograph;  10/9/23 at 12:47am INDICATION: Back pain, cough. COMPARISON: 6/14/23 XR Chest, 5/20/22 XR Chest ACCESSION NUMBER(S): [id number] ORDERING CLINICIAN: LETY MCKEON TECHNIQUE:  Frontal chest was obtained at 00:32 hours. FINDINGS: CARDIOMEDIASTINAL SILHOUETTE: Cardiomediastinal silhouette is enlarged.  Atrial ventricular cardiac leads are demonstrated. LUNGS: Lungs reveal minor atelectasis at the bases. ABDOMEN: No remarkable upper abdominal findings.  BONES: No acute osseous changes.    Minor atelectasis at the bases.  Cardiomegaly Signed by Sergio Turk, DO    CT lumbar spine wo IV contrast    Result Date: 10/8/2023  STUDY: CT Lumbar Spine without IV Contrast; 10/08/2023 at 8:06 PM INDICATION: Severe lumbar pain. COMPARISON: CT lumbar spine 05/20/22, 06/10/20. ACCESSION NUMBER(S): [id number] ORDERING CLINICIAN: LETY MCKEON TECHNIQUE:  CT of the lumbar spine was performed without intravenous or intrathecal contrast.  Sagittal and coronal reconstructions were generated.  Automated mA/kV exposure control was utilized and patient examination was performed in strict accordance with principles of ALARA. FINDINGS: There is no fracture or traumatic subluxation.  Dextroconvex scoliosis of the lumbar spine is present.  Grade 1 anterolisthesis of L4 on L5 and L5 on S1 is present, secondary to adjacent facet arthropathy. Hypertrophic degenerative change noted about the concave surface of the curvature with multilevel disc space narrowing.  Broad-based disc bulges noted throughout the lumbar spine with multilevel vertebral canal narrowing.  Moderate to severe vertebral canal narrowing at L4-L5 is present.  Moderate vertebral canal narrowing at L3-L4 and L5-S1 is present.  Multilevel neural foraminal narrowing noted, worse on  the LEFT at L2-L3 and L3-L4 and on the at RIGHT L4-L5.  The paravertebral soft tissues are within normal limits.  Atelectatic changes noted within the lower lobes.  Partially imaged low-attenuation lesion RIGHT hepatic lobe noted measuring 3.2 cm AP. This is presumably a cyst or hemangioma but lack of IV contrast limits assessment.  Similar smaller lesion about the medial aspect of the RIGHT hepatic lobe measures 1.4 cm in size.    1. Multilevel degenerative changes of the lumbar spine. No acute osseous findings. 2. Low-attenuation lesions in the liver, likely benign. Follow-up ultrasound can be considered for further assessment of these findings. 3. Dextroconvex scoliosis of the lumbar spine is present. Grade 1 anterolisthesis of L4 on L5 and L5 on S1 is present, secondary to adjacent facet arthropathy. Hypertrophic degenerative changes noted about the concave surface of the curvature with multilevel disc space narrowing. Moderate severe vertebral canal narrowing at L4-L5 is present. Moderate vertebral canal narrowing at L3-L4 and L5-S1 is present. Multilevel neural foraminal narrowing noted, worse on the LEFT L2-L3 and L3-L4 and on the RIGHT at L4-L5. Signed by Abe De La Garza II, MD           Continuous medications     PRN medications  PRN medications: ondansetron, oxyCODONE, sodium chloride           Scheduled medications  acetaminophen, 975 mg, oral, q8h  apixaban, 2.5 mg, oral, BID  clopidogrel, 75 mg, oral, Daily  dapagliflozin propanediol, 10 mg, oral, Daily  ferrous sulfate, 1 tablet, oral, BID  influenza, 0.7 mL, intramuscular, During hospitalization  gabapentin, 300 mg, oral, BID  magnesium oxide, 400 mg, oral, Daily  metoprolol succinate XL, 25 mg, oral, Nightly  pantoprazole, 40 mg, oral, BID  predniSONE, 40 mg, oral, Daily  tiZANidine, 2 mg, oral, Daily  valsartan, 40 mg, oral, Daily      Continuous medications     PRN medications  PRN medications: ondansetron, oxyCODONE, sodium chloride  Results for  orders placed or performed during the hospital encounter of 10/08/23 (from the past 24 hour(s))   Basic Metabolic Panel   Result Value Ref Range    Glucose 124 (H) 74 - 99 mg/dL    Sodium 134 (L) 136 - 145 mmol/L    Potassium 4.2 3.5 - 5.3 mmol/L    Chloride 99 98 - 107 mmol/L    Bicarbonate 23 21 - 32 mmol/L    Anion Gap 16 10 - 20 mmol/L    Urea Nitrogen 40 (H) 6 - 23 mg/dL    Creatinine 1.60 (H) 0.50 - 1.05 mg/dL    eGFR 32 (L) >60 mL/min/1.73m*2    Calcium 9.0 8.6 - 10.3 mg/dL   CBC   Result Value Ref Range    WBC 5.5 4.4 - 11.3 x10*3/uL    nRBC 0.0 0.0 - 0.0 /100 WBCs    RBC 3.50 (L) 4.00 - 5.20 x10*6/uL    Hemoglobin 10.3 (L) 12.0 - 16.0 g/dL    Hematocrit 32.2 (L) 36.0 - 46.0 %    MCV 92 80 - 100 fL    MCH 29.4 26.0 - 34.0 pg    MCHC 32.0 32.0 - 36.0 g/dL    RDW 14.2 11.5 - 14.5 %    Platelets 139 (L) 150 - 450 x10*3/uL    MPV 9.9 7.5 - 11.5 fL     {   Assessment/Plan    Assessment/Plan   Principal Problem:    Chronic bilateral low back pain with bilateral sciatica  Active Problems:    Back pain of lumbar region with sciatica  UTI - UA cx growing enteric bacilli - await sensitivities and will order antibiotics   - abd/pelvis CT ordered - hx hemorrhagic cystitis with bladder fulguration 06/23 - has (+) UTI this admission and denies burning, frequency - admits urgency, denies foul odor to urine - currently with moderate blood in the urine      Acute on Chronic Lower Back pain  - CT L Spine: negative for fracture; multiple degenerative changes  - Bone scan pending  - continue gabapentin  - started prednisone, tizanidine  - started acetaminophen scheduled  - started oxycodone prn   - PT/OT evaluation - OOB to chair today, much more comfortable - lives with her son Pradeep and is normally independent with ADL's      UTI  Chronic cystitis/chronic hematuria   - hx hemorrhagic cystitis with fulguration 6/23   - UA 2+ leukocytes; >50 WBC; 2+ bacteria  - moderate blood in UA - chronic (7x's in the past 9 mos) - stable  H&H   - Urine culture: enteric bacilli, await sensitivities   - given ceftriaxone in the ED     Chronic Atrial Fibrillation  CAD  Essential HTN  CHF (chronic systolic and diastolic)   - continue apixaban, clopidogrel, dapagliflozin,metoprolol succinate, valsartan  - monitor BP and HR     Iron Deficiency Anemia  - continue ferrous sulfate     GERD  - continue pantoprazole     DVT ppx  - on apixaban     Code Status: Full     Disposition: Patient requires more than 2 inpatient days.     Total accumulated time spent face to face and not face to face preparing to see the patient, obtaining and reviewing separately obtained history; performing a medically appropriate examination and/or evaluation; counseling and educating the patient, family; ordering medications, tests, or procedures; referring and communicating with other health care professionals; documenting clinical information in the patient's medical record; independently interpreting results and communicating the results to the patient, family; and care coordination was 60 minutes.               Principal Problem:    Chronic bilateral low back pain with bilateral sciatica  Active Problems:    Back pain of lumbar region with sciatica                      Blanca Miller, APRN-CNP

## 2023-10-10 NOTE — NURSING NOTE
Pt. Sitting up in chair today with assistance of PT/OT. Pt. tolerating sitting up in chair without difficulty. Pt. States shei s feeling better. Pt. Aware of pending bone scan this afternoon.

## 2023-10-11 ENCOUNTER — TELEPHONE (OUTPATIENT)
Dept: PRIMARY CARE | Facility: CLINIC | Age: 83
End: 2023-10-11
Payer: MEDICARE

## 2023-10-11 ENCOUNTER — HOME HEALTH ADMISSION (OUTPATIENT)
Dept: HOME HEALTH SERVICES | Facility: HOME HEALTH | Age: 83
End: 2023-10-11
Payer: MEDICARE

## 2023-10-11 VITALS
HEART RATE: 80 BPM | BODY MASS INDEX: 26.32 KG/M2 | DIASTOLIC BLOOD PRESSURE: 77 MMHG | TEMPERATURE: 97.5 F | RESPIRATION RATE: 14 BRPM | SYSTOLIC BLOOD PRESSURE: 173 MMHG | HEIGHT: 60 IN | WEIGHT: 134.04 LBS | OXYGEN SATURATION: 96 %

## 2023-10-11 LAB
ANION GAP SERPL CALC-SCNC: 13 MMOL/L (ref 10–20)
BACTERIA UR CULT: ABNORMAL
BASOPHILS # BLD AUTO: 0.01 X10*3/UL (ref 0–0.1)
BASOPHILS NFR BLD AUTO: 0.1 %
BUN SERPL-MCNC: 42 MG/DL (ref 6–23)
CALCIUM SERPL-MCNC: 8.9 MG/DL (ref 8.6–10.3)
CHLORIDE SERPL-SCNC: 103 MMOL/L (ref 98–107)
CO2 SERPL-SCNC: 23 MMOL/L (ref 21–32)
CREAT SERPL-MCNC: 1.4 MG/DL (ref 0.5–1.05)
EOSINOPHIL # BLD AUTO: 0 X10*3/UL (ref 0–0.4)
EOSINOPHIL NFR BLD AUTO: 0 %
ERYTHROCYTE [DISTWIDTH] IN BLOOD BY AUTOMATED COUNT: 14.1 % (ref 11.5–14.5)
GFR SERPL CREATININE-BSD FRML MDRD: 38 ML/MIN/1.73M*2
GLUCOSE SERPL-MCNC: 94 MG/DL (ref 74–99)
HCT VFR BLD AUTO: 33.5 % (ref 36–46)
HGB BLD-MCNC: 10.6 G/DL (ref 12–16)
IMM GRANULOCYTES # BLD AUTO: 0.07 X10*3/UL (ref 0–0.5)
IMM GRANULOCYTES NFR BLD AUTO: 0.9 % (ref 0–0.9)
LYMPHOCYTES # BLD AUTO: 0.48 X10*3/UL (ref 0.8–3)
LYMPHOCYTES NFR BLD AUTO: 6.3 %
MCH RBC QN AUTO: 29.1 PG (ref 26–34)
MCHC RBC AUTO-ENTMCNC: 31.6 G/DL (ref 32–36)
MCV RBC AUTO: 92 FL (ref 80–100)
MONOCYTES # BLD AUTO: 0.68 X10*3/UL (ref 0.05–0.8)
MONOCYTES NFR BLD AUTO: 8.9 %
NEUTROPHILS # BLD AUTO: 6.43 X10*3/UL (ref 1.6–5.5)
NEUTROPHILS NFR BLD AUTO: 83.8 %
NRBC BLD-RTO: 0 /100 WBCS (ref 0–0)
PLATELET # BLD AUTO: 186 X10*3/UL (ref 150–450)
PMV BLD AUTO: 9.3 FL (ref 7.5–11.5)
POTASSIUM SERPL-SCNC: 4.1 MMOL/L (ref 3.5–5.3)
RBC # BLD AUTO: 3.64 X10*6/UL (ref 4–5.2)
SODIUM SERPL-SCNC: 135 MMOL/L (ref 136–145)
WBC # BLD AUTO: 7.7 X10*3/UL (ref 4.4–11.3)

## 2023-10-11 PROCEDURE — G0008 ADMIN INFLUENZA VIRUS VAC: HCPCS | Performed by: INTERNAL MEDICINE

## 2023-10-11 PROCEDURE — 2500000001 HC RX 250 WO HCPCS SELF ADMINISTERED DRUGS (ALT 637 FOR MEDICARE OP): Performed by: NURSE PRACTITIONER

## 2023-10-11 PROCEDURE — 99239 HOSP IP/OBS DSCHRG MGMT >30: CPT | Performed by: NURSE PRACTITIONER

## 2023-10-11 PROCEDURE — 36415 COLL VENOUS BLD VENIPUNCTURE: CPT | Performed by: NURSE PRACTITIONER

## 2023-10-11 PROCEDURE — 85025 COMPLETE CBC W/AUTO DIFF WBC: CPT | Performed by: NURSE PRACTITIONER

## 2023-10-11 PROCEDURE — 2500000004 HC RX 250 GENERAL PHARMACY W/ HCPCS (ALT 636 FOR OP/ED): Performed by: NURSE PRACTITIONER

## 2023-10-11 PROCEDURE — 97110 THERAPEUTIC EXERCISES: CPT | Mod: GP | Performed by: PHYSICAL THERAPIST

## 2023-10-11 PROCEDURE — 97116 GAIT TRAINING THERAPY: CPT | Mod: GP | Performed by: PHYSICAL THERAPIST

## 2023-10-11 PROCEDURE — 90662 IIV NO PRSV INCREASED AG IM: CPT | Performed by: INTERNAL MEDICINE

## 2023-10-11 PROCEDURE — 2500000004 HC RX 250 GENERAL PHARMACY W/ HCPCS (ALT 636 FOR OP/ED): Performed by: INTERNAL MEDICINE

## 2023-10-11 PROCEDURE — 80048 BASIC METABOLIC PNL TOTAL CA: CPT | Performed by: NURSE PRACTITIONER

## 2023-10-11 PROCEDURE — 87493 C DIFF AMPLIFIED PROBE: CPT | Mod: CMCLAB,GENLAB | Performed by: NURSE PRACTITIONER

## 2023-10-11 PROCEDURE — 97535 SELF CARE MNGMENT TRAINING: CPT | Mod: GO

## 2023-10-11 PROCEDURE — G0378 HOSPITAL OBSERVATION PER HR: HCPCS

## 2023-10-11 RX ORDER — PREDNISONE 20 MG/1
TABLET ORAL
Qty: 18 TABLET | Refills: 0 | Status: SHIPPED | OUTPATIENT
Start: 2023-10-11 | End: 2023-10-11 | Stop reason: HOSPADM

## 2023-10-11 RX ORDER — PREDNISONE 10 MG/1
TABLET ORAL
Qty: 16 TABLET | Refills: 0 | Status: SHIPPED | OUTPATIENT
Start: 2023-10-11 | End: 2023-10-18

## 2023-10-11 RX ORDER — LOPERAMIDE HYDROCHLORIDE 2 MG/1
2 CAPSULE ORAL 4 TIMES DAILY PRN
Status: DISCONTINUED | OUTPATIENT
Start: 2023-10-11 | End: 2023-10-11 | Stop reason: HOSPADM

## 2023-10-11 RX ORDER — CEFPODOXIME PROXETIL 200 MG/1
200 TABLET, FILM COATED ORAL 2 TIMES DAILY
Qty: 14 TABLET | Refills: 0 | Status: SHIPPED | OUTPATIENT
Start: 2023-10-11 | End: 2023-10-18

## 2023-10-11 RX ADMIN — CLOPIDOGREL BISULFATE 75 MG: 75 TABLET ORAL at 08:43

## 2023-10-11 RX ADMIN — INFLUENZA A VIRUS A/VICTORIA/4897/2022 IVR-238 (H1N1) ANTIGEN (FORMALDEHYDE INACTIVATED), INFLUENZA A VIRUS A/DARWIN/9/2021 SAN-010 (H3N2) ANTIGEN (FORMALDEHYDE INACTIVATED), INFLUENZA B VIRUS B/PHUKET/3073/2013 ANTIGEN (FORMALDEHYDE INACTIVATED), AND INFLUENZA B VIRUS B/MICHIGAN/01/2021 ANTIGEN (FORMALDEHYDE INACTIVATED) 0.7 ML: 60; 60; 60; 60 INJECTION, SUSPENSION INTRAMUSCULAR at 13:55

## 2023-10-11 RX ADMIN — DAPAGLIFLOZIN 10 MG: 10 TABLET, FILM COATED ORAL at 08:43

## 2023-10-11 RX ADMIN — Medication 400 MG: at 08:48

## 2023-10-11 RX ADMIN — PREDNISONE 40 MG: 20 TABLET ORAL at 08:48

## 2023-10-11 RX ADMIN — VALSARTAN 40 MG: 80 TABLET, FILM COATED ORAL at 08:48

## 2023-10-11 RX ADMIN — ACETAMINOPHEN 975 MG: 325 TABLET ORAL at 11:57

## 2023-10-11 RX ADMIN — GABAPENTIN 300 MG: 300 CAPSULE ORAL at 08:48

## 2023-10-11 RX ADMIN — FERROUS SULFATE TAB 325 MG (65 MG ELEMENTAL FE) 325 MG: 325 (65 FE) TAB at 08:48

## 2023-10-11 RX ADMIN — PANTOPRAZOLE SODIUM 40 MG: 40 TABLET, DELAYED RELEASE ORAL at 08:48

## 2023-10-11 RX ADMIN — TIZANIDINE 2 MG: 4 TABLET ORAL at 08:43

## 2023-10-11 RX ADMIN — APIXABAN 2.5 MG: 2.5 TABLET, FILM COATED ORAL at 08:42

## 2023-10-11 ASSESSMENT — COGNITIVE AND FUNCTIONAL STATUS - GENERAL
DRESSING REGULAR UPPER BODY CLOTHING: A LITTLE
DAILY ACTIVITIY SCORE: 18
STANDING UP FROM CHAIR USING ARMS: A LITTLE
DRESSING REGULAR LOWER BODY CLOTHING: A LOT
MOBILITY SCORE: 19
HELP NEEDED FOR BATHING: A LITTLE
TOILETING: A LITTLE
CLIMB 3 TO 5 STEPS WITH RAILING: A LITTLE
MOBILITY SCORE: 18
DRESSING REGULAR LOWER BODY CLOTHING: A LITTLE
TURNING FROM BACK TO SIDE WHILE IN FLAT BAD: A LITTLE
WALKING IN HOSPITAL ROOM: A LITTLE
TOILETING: A LITTLE
HELP NEEDED FOR BATHING: A LITTLE
MOVING TO AND FROM BED TO CHAIR: A LITTLE
CLIMB 3 TO 5 STEPS WITH RAILING: A LOT
PERSONAL GROOMING: A LITTLE
MOVING TO AND FROM BED TO CHAIR: A LITTLE
TURNING FROM BACK TO SIDE WHILE IN FLAT BAD: A LITTLE
STANDING UP FROM CHAIR USING ARMS: A LITTLE
WALKING IN HOSPITAL ROOM: A LITTLE
PERSONAL GROOMING: A LITTLE
DRESSING REGULAR UPPER BODY CLOTHING: A LITTLE
DAILY ACTIVITIY SCORE: 19

## 2023-10-11 ASSESSMENT — PAIN - FUNCTIONAL ASSESSMENT
PAIN_FUNCTIONAL_ASSESSMENT: 0-10

## 2023-10-11 ASSESSMENT — PAIN SCALES - GENERAL
PAINLEVEL_OUTOF10: 0 - NO PAIN

## 2023-10-11 ASSESSMENT — ACTIVITIES OF DAILY LIVING (ADL)
HOME_MANAGEMENT_TIME_ENTRY: 45
BATHING_LEVEL_OF_ASSISTANCE: MINIMUM ASSISTANCE

## 2023-10-11 NOTE — CARE PLAN
Problem: ADLs  Goal: S toileting with A/safety devices  Outcome: Progressing  Goal: Close SBA LE bathing with A/safety devices.  Outcome: Progressing  Goal: Close SBA LE dressing with adaptive equipment as indicated.  Outcome: Progressing     Problem: TRANSFERS  Goal: Close S transfers to the bed, chair & toilet with A/safety devices.  Outcome: Progressing     Problem: MOBILITY  Goal: S bed mobility as needed to participate in ADL's.  Outcome: Progressing

## 2023-10-11 NOTE — CARE PLAN
The patient's goals for the shift include pain at 6 or less    The clinical goals for the shift include pt will rate pain at 5 or less    Over the shift, the patient denies back pain this shift. Patient states that she is ready to go home. However she does not want to complete rehab at a nursing home but would like to have in home rehab services instead. ATB therapy tolerated this shift, afebrile. At HS patient stated she was tired from the day having test completed and being up and down to the bsc and would like to have a pur-wick for over night so she could be well rested to go home. Patient tolerated well and was able to get adequate sleep this shift.

## 2023-10-11 NOTE — CARE PLAN
The patient's goals for the shift include Patient will be up to chiar during the day this shift 10/11/23 1900.    The clinical goals for the shift include Patient will rate pain at 5 or less than this shift 10/11/23 1900    Patient discharged home, discharge instructions reviewed, questions regarding CT of the chest and why, note left for NP to call patient and discuss. IV removed. No complaints of pain this shift.

## 2023-10-11 NOTE — CARE PLAN
Problem: Balance  Goal: STG - Maintains dynamic standing balance without upper extremity support       Problem: Mobility  Goal: LTG - Patient will ambulate household distance  Outcome: Progressing     Problem: Transfers  Goal: STG - Patient will perform bed mobility IND  Outcome: Progressing     Problem: Transfers  Goal: STG - Patient will transfer sit to and from stand IND  Outcome: Progressing

## 2023-10-11 NOTE — PROGRESS NOTES
Patient medically ready for discharge.  Patient follow up information on discharge instructions.  Patient will be returning home with [facility].   Patient is in agreement with discharge plan.  Rosemary Mas RN,TCC

## 2023-10-11 NOTE — NURSING NOTE
Patient utilizing call light for bathroom assistance, RN assisted patient with the walker. Patient requesting to utilize the pur-wick as she had a busy day and is very tired and wants to sleep. RN in agreement for patient request. STNA to unit and assisted patient  back to bed and placed pur-wick. Call light in reach.

## 2023-10-11 NOTE — TELEPHONE ENCOUNTER
Guanaco Reyes, can you please make Shaina a follow up appt, home care requiring a face to face appt, and it has been more than 90 days since last seen. She needs to be seen for hospital follow up  Thanks     10/13/2023 Spoke with patients son, advised him an appointment was needed so we could sign for home health orders.  Advised I spoke with him last week also to set up an appointment.  He sttd he needed a handicap placard, advised when he came in for the appointment we could do then.  He stated that he had a lot of follow up appointments on his calendar and will call me on Monday to schedule a hosp follow up.

## 2023-10-11 NOTE — DISCHARGE SUMMARY
Discharge Diagnosis  Chronic bilateral low back pain with bilateral sciatica    Discharge Meds     Your medication list        START taking these medications        Instructions Last Dose Given Next Dose Due   cefpodoxime 200 mg tablet  Commonly known as: Vantin      Take 1 tablet (200 mg) by mouth 2 times a day for 7 days.       predniSONE 10 mg tablet  Commonly known as: Deltasone  Start taking on: October 11, 2023      Take 4 tablets (40 mg) by mouth once daily for 1 day, THEN 3 tablets (30 mg) once daily for 2 days, THEN 2 tablets (20 mg) once daily for 2 days, THEN 1 tablet (10 mg) once daily for 2 days.              CONTINUE taking these medications        Instructions Last Dose Given Next Dose Due   clopidogrel 75 mg tablet  Commonly known as: Plavix           dapagliflozin propanediol 10 mg  Commonly known as: Farxiga           Eliquis 2.5 mg tablet  Generic drug: apixaban      TAKE ONE TABLET BY MOUTH TWO TIMES A DAY       ferrous sulfate 325 (65 Fe) MG tablet  Commonly known as: FeroSuL      Take 1 tablet (325 mg) by mouth 2 times a day.       gabapentin 300 mg capsule  Commonly known as: Neurontin      TAKE ONE CAPSULE BY MOUTH TWO TIMES A DAY       magnesium oxide 400 mg (241.3 mg magnesium) tablet  Commonly known as: Mag-Ox      TAKE ONE TABLET BY MOUTH EVERY DAY       metoprolol succinate XL 25 mg 24 hr tablet  Commonly known as: Toprol-XL           pantoprazole 40 mg EC tablet  Commonly known as: ProtoNix      TAKE ONE TABLET BY MOUTH TWO TIMES A DAY       sodium chloride 0.65 % nasal drops  Commonly known as: Ayr           valsartan 40 mg tablet  Commonly known as: Diovan                     Where to Get Your Medications        These medications were sent to [id number] - [place], [place] - [address]  [address] [address]      Phone: [phone]   cefpodoxime 200 mg tablet  predniSONE 10 mg tablet         Test Results Pending At Discharge  Pending Labs        Order Current Status    Extra Tubes In process    Light Blue Top In process    SST TOP In process    Urine Culture Preliminary result            Hospital Course   History Of Present Illness  Shaina Iverson is a 82 y.o. female presenting with a complaint of lower back pain. She has chronic back pain; but yesterday was worse. It hurts to move, breath, she can barely stand. She has been hot than cold. She denies chest pain, SOB, or cough.     In the ED:  Labs: Glu 96; Na 135; K 4.2; BUN 42; Cr 1.95; Lactate 2.5; CRP 21.65;WBC 9.2; Hb 12.6; Hcrt 39.5; ; UA 2+ leukocytes; > 50 WBC; 2+ bacteria;  Radiology: CT L Spine Multilevel degenerative changes of the lumbar spine. No acute  osseous findings. Dextroconvex scoliosis of the lumbar spine is present. Grade 1 anterolisthesis of L4 on L5 and L5 on S1 is present, secondary to adjacent facet arthropathy. Hypertrophic degenerative changes noted about the concave surface of the curvature with multilevel disc space narrowing. Moderate severe vertebral canal narrowing at L4-L5 is present. Moderate vertebral canal narrowing at L3-L4 and L5-S1 is present. Multilevel neural foraminal narrowing noted, worse on the LEFT L2-L3 and L3-L4 and on the RIGHT at L4-L5. CXR negative  Medication: ceftriaxone  Disposition: Admitted to Med/surg     Past Medical History  She has a past medical history of Acute on chronic diastolic (congestive) heart failure (CMS/[facility]) (01/28/2020), Aneurysm of the ascending aorta, without rupture (CMS/[facility]) (01/28/2020), Atherosclerotic heart disease of native coronary artery with other forms of angina pectoris (CMS/[facility]) (11/15/2019), H/O ascending aortic replacement (02/01/2023), Heart failure, unspecified (CMS/[facility]) (08/26/2019), Nonrheumatic aortic (valve) insufficiency (06/23/2022), Other general symptoms and signs (04/03/2018), Personal history of other diseases of the circulatory system, Personal history of other diseases of the circulatory system,  Personal history of other diseases of the musculoskeletal system and connective tissue (07/27/2021), S/P cardiac pacemaker procedure (02/01/2023), S/P coronary artery stent placement (02/01/2023), Severe aortic regurgitation (02/01/2023), Sinus node dysfunction (CMS/HCC) (02/01/2023), 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     Surgical History  She has a past surgical history that includes Other surgical history (09/21/2017); Bladder surgery (04/06/2017); Hysterectomy (04/06/2017); Other surgical history (04/06/2017); Cardiac pacemaker placement (04/06/2017); and Other surgical history (03/13/2019).     Social History  She reports that she has never smoked. She has never used smokeless tobacco. She reports that she does not drink alcohol. No history on file for drug use.     Family History  Family History          Family History   Problem Relation Name Age of Onset    Diabetes Mother        Hypertension Brother        Other (Cardiac Disorder) Brother        Liver cancer Brother        Diabetes Mother's Sister        Diabetes Mother's Brother            XR chest 1 view   Result Date: 10/9/2023  Minor atelectasis at the bases.  Cardiomegaly     CT lumbar spine wo IV contrast   Result Date: 10/8/2023  1. Multilevel degenerative changes of the lumbar spine. No acute osseous findings. 2. Low-attenuation lesions in the liver, likely benign. Follow-up ultrasound can be considered for further assessment of these findings. 3. Dextroconvex scoliosis of the lumbar spine is present. Grade 1 anterolisthesis of L4 on L5 and L5 on S1 is present, secondary to adjacent facet arthropathy. Hypertrophic degenerative changes noted about the concave surface of the curvature with multilevel disc space narrowing. Moderate severe vertebral canal narrowing at L4-L5 is present.  Moderate vertebral canal narrowing at L3-L4 and L5-S1 is present. Multilevel neural foraminal narrowing noted, worse on the LEFT L2-L3 and L3-L4 and on the RIGHT at L4-L5.       Scheduled medications  acetaminophen, 975 mg, oral, q8h  apixaban, 2.5 mg, oral, BID  clopidogrel, 75 mg, oral, Daily  dapagliflozin propanediol, 10 mg, oral, Daily  ferrous sulfate, 1 tablet, oral, BID  influenza, 0.7 mL, intramuscular, During hospitalization  gabapentin, 300 mg, oral, BID  magnesium oxide, 400 mg, oral, Daily  metoprolol succinate XL, 25 mg, oral, Nightly  pantoprazole, 40 mg, oral, BID  predniSONE, 40 mg, oral, Daily  tiZANidine, 2 mg, oral, Daily  valsartan, 40 mg, oral, Daily     Continuous medications  PRN medications  PRN medications: ondansetron, oxyCODONE, sodium chloride          Results for orders placed or performed during the hospital encounter of 10/08/23 (from the past 24 hour(s))   Basic Metabolic Panel   Result Value Ref Range     Glucose 124 (H) 74 - 99 mg/dL     Sodium 134 (L) 136 - 145 mmol/L     Potassium 4.2 3.5 - 5.3 mmol/L     Chloride 99 98 - 107 mmol/L     Bicarbonate 23 21 - 32 mmol/L     Anion Gap 16 10 - 20 mmol/L     Urea Nitrogen 40 (H) 6 - 23 mg/dL     Creatinine 1.60 (H) 0.50 - 1.05 mg/dL     eGFR 32 (L) >60 mL/min/1.73m*2     Calcium 9.0 8.6 - 10.3 mg/dL   CBC   Result Value Ref Range     WBC 5.5 4.4 - 11.3 x10*3/uL     nRBC 0.0 0.0 - 0.0 /100 WBCs     RBC 3.50 (L) 4.00 - 5.20 x10*6/uL     Hemoglobin 10.3 (L) 12.0 - 16.0 g/dL     Hematocrit 32.2 (L) 36.0 - 46.0 %     MCV 92 80 - 100 fL     MCH 29.4 26.0 - 34.0 pg     MCHC 32.0 32.0 - 36.0 g/dL     RDW 14.2 11.5 - 14.5 %     Platelets 139 (L) 150 - 450 x10*3/uL     MPV 9.9 7.5 - 11.5 fL      Assessment/Plan    Assessment/Plan   Principal Problem:    Chronic bilateral low back pain with bilateral sciatica  Active Problems:    Back pain of lumbar region with sciatica    UTI      Acute on Chronic Lower Back pain  - CT L Spine: negative for  fracture; multiple degenerative changes  - Bone scan pending  - continue gabapentin  - started prednisone, tizanidine  - started acetaminophen scheduled  - started oxycodone prn   - PT/OT evaluation - OOB to chair today, much more comfortable - lives with her son Pradeep and is normally independent with ADL's   ----DC home with [facility], PT/OT  ----Prednisone taper  ----follow up with Dr. Salgado in 1-2 weeks     UTI  Chronic cystitis/chronic hematuria   - hx hemorrhagic cystitis with fulguration 6/23   - UA 2+ leukocytes; >50 WBC; 2+ bacteria  - moderate blood in UA - chronic (7x's in the past 9 mos) - stable H&H   - Urine culture: enteric bacilli, await sensitivities   - given ceftriaxone in the ED  ----Cefpodoxime x 7 days orally  ----follow up with PCP 1-2 weeks     Chronic Atrial Fibrillation  CAD  Essential HTN  CHF (chronic systolic and diastolic)   - continue apixaban, clopidogrel, dapagliflozin,metoprolol succinate, valsartan  - monitor BP and HR  ----Dc home  ----continue home meds as ordered  ----follow up with PCP as needed     Iron Deficiency Anemia  - continue ferrous sulfate  ----Dc home  ----continue home meds as ordered  ----follow up with PCP as needed     GERD  - continue pantoprazole  ----Dc home  ----continue home meds as ordered  ----follow up with PCP as needed    Stable for discharge home with [facility]  Total cumulative time spent in preparation of this discharge including documentation review, coordination of care with the medical team including PT/SW/care coordinators and treating consultants, discussion with patient and pertinent family members and finalization of prescriptions, follow-up appointments, and this discharge summary was approximately 45 minutes.    Pertinent Physical Exam At Time of Discharge  Physical Exam  Constitutional:       General: She is not in acute distress.     Appearance: Normal appearance. She is not toxic-appearing.   HENT:      Head: Normocephalic and atraumatic.       Mouth/Throat:      Mouth: Mucous membranes are moist.   Eyes:      Extraocular Movements: Extraocular movements intact.      Pupils: Pupils are equal, round, and reactive to light.   Cardiovascular:      Rate and Rhythm: Normal rate and regular rhythm.      Pulses: Normal pulses.      Heart sounds: Normal heart sounds. No murmur heard.     No gallop.   Pulmonary:      Effort: Pulmonary effort is normal. No respiratory distress.      Breath sounds: Normal breath sounds. No wheezing, rhonchi or rales.   Abdominal:      General: Bowel sounds are normal. There is no distension.      Palpations: Abdomen is soft.      Tenderness: There is no abdominal tenderness. There is no guarding or rebound.   Musculoskeletal:         General: No swelling, tenderness, deformity or signs of injury. Normal range of motion.      Cervical back: Normal range of motion and neck supple.   Skin:     General: Skin is warm and dry.      Capillary Refill: Capillary refill takes less than 2 seconds.      Coloration: Skin is not jaundiced.      Findings: No bruising or rash.   Neurological:      General: No focal deficit present.      Mental Status: She is alert and oriented to person, place, and time.      Cranial Nerves: No cranial nerve deficit.      Sensory: No sensory deficit.      Motor: No weakness.      Gait: Gait normal.   Psychiatric:         Mood and Affect: Mood normal.         Behavior: Behavior normal.         Thought Content: Thought content normal.         Judgment: Judgment normal.       Outpatient Follow-Up  Future Appointments   Date Time Provider Department [place]   11/14/2023  3:00 PM GEN CT 1 GENCT GEN [place]    11/20/2023  3:30 PM GEN RESPIRATORY THERAPIST GENRES1 [facility]   11/20/2023  4:30 PM GEN RESPIRATORY THERAPIST GENRES1 [facility]   12/5/2023  3:00 PM Joanne Bautista MD [id number] [facility]   4/9/2024  1:00 PM Abe Johnson MD [id number] [facility]       Joi Brennan, APRN-CNP

## 2023-10-11 NOTE — PROGRESS NOTES
Occupational Therapy    Occupational Therapy Treatment    Name: Shaina Iverson  MRN: [id number]  : 1940  Date: 10/11/23  Time Calculation  Start Time: 739  Stop Time: 824  Time Calculation (min): 45 min    Assessment:  OT Assessment:Good participation with the pt. progressing to a CGA & cues level for transfers to the toilet with use of a grab bar & a FWW.  Improvement with LE bathing to Chandra & cues to perform distal LE bathing & CGA & cues for the stand phase of bathing.  Prognosis: Good  Barriers to Discharge: comorbidities  Evaluation/Treatment Tolerance: Patient tolerated treatment well  Medical Staff Made Aware: Yes  Plan:  Treatment Interventions: ADL retraining, Patient/family training, Functional transfer training  OT Frequency: 4 times per week  OT Discharge Recommendations: Moderate intensity level of continued care  Equipment Recommended upon Discharge:dressing stick, long handled sponge, sock aid  OT Recommended Transfer Status: Assist of 1    Subjective   General:  OT Last Visit  OT Received On: 10/11/23  General  Reason for Referral: Impaired self care skills & functioanl transfers  Referred By: Lizzeth Houston CNP  Past Medical History Relevant to Rehab: CHF, aneurysm of ascending aorta, severe aortic regurgitation, pacemaker, coronary artery stent, tibia fx, bladder surgery, aortic valve replacement, L HAN  Family/Caregiver Present: No  Prior to Session Communication: Bedside nurse  Patient Position Received:On the toilet  Preferred Learning Style: verbal, visual  Vitals:  SpO2: 96 %  Patient Position: Sitting    Pain Assessment:  Pain Assessment  Pain Assessment: 0-10  Pain Score: 0 - No pain    Objective   Activities of Daily Living: Grooming  Grooming Level of Assistance: Setup, Minimum assistance  Grooming Where Assessed: Chair  Grooming Comments: Chandra to assist the pt. with washing her hair per her request from a modified position.  The pt. performed other grooming tasks with S set  up.     UE Bathing  UE Bathing Level of Assistance: Setup  UE Bathing Where Assessed: bedside chair    LE Bathing  LE Bathing Level of Assistance: Minimum assistance  LE Bathing Where Assessed:bedside chair  LE Bathing Comments: Daniella & cues to bathe her distal LE's.  Recommended use of a long handled sponge to bathe her distal LE's. CGA & cues for standing balance when performing perineal hygiene care with cues provided for use of unilateral support for increased safety with standing balance & to reduce her risk of falls.    UE Dressing  UE Dressing Level of Assistance: Minimum assistance  UE Dressing Where Assessed: Chair  UE Dressing Comments: Daniella & cues to assist with donning a hospital gown,  Cues to begin with her L UE first for increased ease.    LE Dressing  LE Dressing: Yes  LE Dressing Adaptive Equipment:reacher, dressing stick, sock aid  Pants Level of Assistance: Contact guard after set up with use of a dressing stick.  The pt. initially tried using a reacher, however the reacher was too difficult for the pt. to use due to the length of the reacher.  Sock Level of Assistance: Close SBA & cues to don B slipper socks with use of a sock aid after set up of the sock aid was provided.  Adult Briefs Level of Assistance: Contact guard & cues after set up with use of a reacher & dressing stick.  LE Dressing Where Assessed: Chair  LE Dressing Comments: OT instructed the pt. on use of adaptive equipment for increased ease with LE dressing & in effort to decrease her back pain during LE dressing.    Toileting  Toileting Adaptive Equipment: grab bar & FWW use.  Toileting Level of Assistance: Contact guard  Where Assessed: Toilet  Toileting Comments: CGA & cues for standing balance when performing perineal hygiene care & pull up brief management over her hips.    Bed Mobility/Transfers: Transfer 1  Technique 1: Sit to stand, Stand pivot  Transfer Device 1: Walker  Transfer Level of Assistance 1: Contact  guard  Trials/Comments 1: minimal cues for safety    Toilet Transfers  Toilet Transfers: Contact guard, Verbal cues  Toilet Transfers Comments: with use of a FWW & grab bar use    Strength  Strength Comments: Distal R UE strength is grossly G-, L UE distal strength is grossly F+    Outcome Measures:  [facility] Daily Activity  Putting on and taking off regular lower body clothing: A lot  Bathing (including washing, rinsing, drying): A little  Putting on and taking off regular upper body clothing: A little  Toileting, which includes using toilet, bedpan or urinal: A little  Taking care of personal grooming such as brushing teeth: A little  Eating Meals: None  Daily Activity - Total Score: 18      Education Documentation  Precautions, taught by Becki Rudolph OT at 10/11/2023 12:52 PM.  Learner: Patient  Readiness: Acceptance  Method: Explanation, Demonstration  Response: Verbalizes Understanding, Demonstrated Understanding    ADL Training, taught by Becki Rudolph OT at 10/11/2023 12:52 PM.  Learner: Patient  Readiness: Acceptance  Method: Explanation, Demonstration  Response: Verbalizes Understanding, Demonstrated Understanding    Education Comments  No comments found.      Goals:  Encounter Problems       Encounter Problems (Active)       ADLs       S toileting with A/safety devices (Progressing)       Start:  10/10/23    Expected End:  10/23/23            Close SBA LE bathing with A/safety devices. (Progressing)       Start:  10/10/23    Expected End:  10/23/23            Close SBA LE dressing with adaptive equipment as indicated. (Progressing)       Start:  10/10/23    Expected End:  10/23/23               BALANCE       Close SBA standing balance as needed  for self care performance with A device. (Progressing)       Start:  10/10/23    Expected End:  10/23/23               Balance       STG - Maintains dynamic standing balance without upper extremity support (Progressing)       Start:  10/10/23    Expected End:   10/24/23       INTERVENTIONS:  1. Practice standing with minimal support.  2. Educate patient about standing tolerance.  3. Educate patient about independence with gait, transfers, and ADL's.  4. Educate patient about use of assistive device.  5. Educate patient about self-directed care.            MOBILITY       S bed mobility as needed to participate in ADL's. (Progressing)       Start:  10/10/23    Expected End:  10/23/23               Mobility       LTG - Patient will ambulate household distance (Progressing)       Start:  10/10/23    Expected End:  10/24/23               TRANSFERS       Close S transfers to the bed, chair & toilet with A/safety devices. (Progressing)       Start:  10/10/23    Expected End:  10/23/23               Transfers       STG - Patient will perform bed mobility IND (Progressing)       Start:  10/10/23    Expected End:  10/24/23            STG - Patient will transfer sit to and from stand IND (Progressing)       Start:  10/10/23    Expected End:  10/24/23

## 2023-10-11 NOTE — PROGRESS NOTES
Physical Therapy    Physical Therapy Treatment    Patient Name: Shaina Iverson  MRN: [id number]  Today's Date: 10/11/2023  Time Calculation  Start Time: 1140  Stop Time: 1205  Time Calculation (min): 25 min       Assessment/Plan   PT Assessment  PT Assessment Results: Decreased strength, Decreased endurance, Decreased mobility, Pain  Rehab Prognosis: Good  Strengths: Housing layout, Support of Caregivers  End of Session Communication: Bedside nurse  Assessment Comment: Pleasant 82 y.o presents with pain and weakness. Pt. lives with her son and is normally IND with amb. (uses furniture to amb.)  Pt. currently requires SBA for amb. and safety and would benefit from additional PT to address above noted limitations and prevent further decline.  End of Session Patient Position: Up in chair     PT Plan  PT Plan: Skilled PT  PT Frequency: 4 times per week  PT Discharge Recommendations: Moderate intensity level of continued care      General Visit Information:   PT  Visit  PT Received On: 10/11/23  General  Reason for Referral: weakness; LBP  Referred By: Lizzeth Houston CNP  Past Medical History Relevant to Rehab: CHF, aneurysm of ascending aorta, severe aortic regurgitation, pacemaker, coronary artery stent, tibia fx, bladder surgery, aortic valve replacement, L HAN  Family/Caregiver Present: Yes  Prior to Session Communication: Bedside nurse  Patient Position Received: Bed, 2 rail up    Subjective   Pt. Agreeable to PT. Planning on going home today     Precautions  Medical Precautions: Fall precautions  Vital Signs:  Vital Signs  Heart Rate: 97  SpO2:  (96% prior to session; 94% post session)    Objective   Pain:  Pain Assessment  Pain Assessment: 0-10  Pain Score:  (0 at rest; 5/10 in L hip with mobility; decreased to 4/10 post session)  Pain Type: Chronic pain  Cognition:  Cognition  Overall Cognitive Status: Within Functional Limits    Activity Tolerance:   Good tolerance.   Treatments:  Therapeutic  Exercise  Therapeutic Exercise Performed:  (B ankle pumps 10x2, LAQ 10x2, hip flex 10x2; sit to stand 5x)    Bed Mobility  Bed Mobility: Yes  Bed Mobility 1  Bed Mobility 1: Supine to sitting (SBA)    Ambulation/Gait Training  Ambulation/Gait Training Performed: Yes  Ambulation/Gait Training 1  Surface 1: Level tile  Device 1: Rolling walker  Assistance 1: Close supervision  Quality of Gait 1:  (+[place])  Comments/Distance (ft) 1: 100' x 2  Transfers  Transfer: Yes  Transfer 1  Technique 1: Sit to stand  Transfer Device 1: Walker  Transfer Level of Assistance 1: Close supervision  Transfers 2  Transfer From 2: Toilet to (stand)  Technique 2: Stand to sit  Transfer Device 2: Walker  Transfer Level of Assistance 2: Minimum assistance    Outcome Measures:  [facility] Basic Mobility  Turning from your back to your side while in a flat bed without using bedrails: None  Moving from lying on your back to sitting on the side of a flat bed without using bedrails: A little  Moving to and from bed to chair (including a wheelchair): A little  Standing up from a chair using your arms (e.g. wheelchair or bedside chair): A little  To walk in hospital room: A little  Climbing 3-5 steps with railing: A little  Basic Mobility - Total Score: 19           Encounter Problems       Encounter Problems (Active)       Balance       STG - Maintains dynamic standing balance without upper extremity support (Progressing)       Start:  10/10/23    Expected End:  10/24/23       INTERVENTIONS:  1. Practice standing with minimal support.  2. Educate patient about standing tolerance.  3. Educate patient about independence with gait, transfers, and ADL's.  4. Educate patient about use of assistive device.  5. Educate patient about self-directed care.            Mobility       LTG - Patient will ambulate household distance (Progressing)       Start:  10/10/23    Expected End:  10/24/23               Pain          Pain - Adult          Transfers        STG - Patient will perform bed mobility IND (Progressing)       Start:  10/10/23    Expected End:  10/24/23            STG - Patient will transfer sit to and from stand IND (Progressing)       Start:  10/10/23    Expected End:  10/24/23

## 2023-10-11 NOTE — CARE PLAN
The patient's goals for the shift include Patient will be up to chiar during the day this shift 10/11/23 1900.    The clinical goals for the shift include Patient will rate pain at 5 or less than this shift 10/11/23 1900    Patient discharged home, discharge instructions reviewed with patient, questions regarding CT of the chest. Note left for NP to call patient and discuss. No complaints of pain. IV removed.

## 2023-10-12 ENCOUNTER — APPOINTMENT (OUTPATIENT)
Dept: NEPHROLOGY | Facility: CLINIC | Age: 83
End: 2023-10-12
Payer: MEDICARE

## 2023-10-12 ENCOUNTER — PATIENT OUTREACH (OUTPATIENT)
Dept: PRIMARY CARE | Facility: CLINIC | Age: 83
End: 2023-10-12

## 2023-10-12 ENCOUNTER — DOCUMENTATION (OUTPATIENT)
Dept: PRIMARY CARE | Facility: CLINIC | Age: 83
End: 2023-10-12

## 2023-10-12 LAB — C DIF TOX TCDA+TCDB STL QL NAA+PROBE: DETECTED

## 2023-10-12 NOTE — PROGRESS NOTES
Discharge Facility:[facility]  Discharge Diagnosis:Chronic bilateral lower back pain  Admission Date:10/9/23  Discharge Date: 10/11/23    PCP Appointment Date:Patient needs hospital discharge follow up  Specialist Appointment Date:   Hospital Encounter and Summary: Linked   2 attempts made, no contact as of this note.

## 2023-10-14 DIAGNOSIS — I10 ESSENTIAL HYPERTENSION: Primary | ICD-10-CM

## 2023-10-16 ENCOUNTER — HOME CARE VISIT (OUTPATIENT)
Dept: HOME HEALTH SERVICES | Facility: HOME HEALTH | Age: 83
End: 2023-10-16
Payer: MEDICARE

## 2023-10-16 VITALS
SYSTOLIC BLOOD PRESSURE: 120 MMHG | RESPIRATION RATE: 18 BRPM | DIASTOLIC BLOOD PRESSURE: 68 MMHG | OXYGEN SATURATION: 96 % | HEART RATE: 76 BPM

## 2023-10-16 PROCEDURE — G0151 HHCP-SERV OF PT,EA 15 MIN: HCPCS

## 2023-10-16 PROCEDURE — 0023 HH SOC

## 2023-10-16 RX ORDER — VALSARTAN 40 MG/1
40 TABLET ORAL DAILY
Qty: 90 TABLET | Refills: 3 | Status: SHIPPED | OUTPATIENT
Start: 2023-10-16

## 2023-10-16 SDOH — HEALTH STABILITY: PHYSICAL HEALTH: EXERCISE TYPE: WRITTEN HEP

## 2023-10-16 ASSESSMENT — ACTIVITIES OF DAILY LIVING (ADL)
AMBULATION ASSISTANCE: 1
PHYSICAL TRANSFERS ASSESSED: 1
AMBULATION_DISTANCE/DURATION_TOLERATED: 20 FEET
AMBULATION ASSISTANCE: STAND BY ASSIST
OASIS_M1830: 03
AMBULATION ASSISTANCE ON FLAT SURFACES: 1
CURRENT_FUNCTION: STAND BY ASSIST
ENTERING_EXITING_HOME: STAND BY ASSIST
CURRENT_FUNCTION: MINIMUM ASSIST

## 2023-10-16 ASSESSMENT — ENCOUNTER SYMPTOMS
HIGHEST PAIN SEVERITY IN PAST 24 HOURS: 5/10
MUSCLE WEAKNESS: 1
PAIN SEVERITY GOAL: 0/10
PAIN LOCATION - PAIN QUALITY: DULL ACHE
LOWEST PAIN SEVERITY IN PAST 24 HOURS: 3/10
PAIN LOCATION - PAIN DURATION: CHRONIC
PAIN: 1
PAIN LOCATION - RELIEVING FACTORS: REST, HEAT
HYPERTENSION: 1
SUBJECTIVE PAIN PROGRESSION: GRADUALLY IMPROVING
PERSON REPORTING PAIN: PATIENT
PAIN LOCATION - PAIN FREQUENCY: CONSTANT
PAIN LOCATION - PAIN SEVERITY: 3/10
PAIN LOCATION: BACK

## 2023-10-17 NOTE — CASE COMMUNICATION
PT SOC completed. Patient is an 82 year old female with recent ED visit due to severe LBP and sciatica. Patient also diagnosed with UTI. Patient PMH is afib, pacemaker, HTN, CAD, OA. Patient lives in 2 story home but lives primarily on main floor and patients son lives upstairs and provides patient with assistance when needed, patient has ramp to enter and exit home. Patient strongly encouraged to ambulate with FWW currently, however am bulated with no AD prior. Patient requires MIN A with bed transfer per son report and SBA with sit <> stand transfer. SBA with ambulation with FWW. Patient declining OT services. Patient will benefit from skilled PT services to improve strength, balance, transfer, and ambulation ability and safety to reduce risk of falling and improve functional ability and safety. TUG time was 42 seconds with FWW. Patient agreeable with PT POC, PT freq uency 2w4.

## 2023-10-18 ENCOUNTER — HOME CARE VISIT (OUTPATIENT)
Dept: HOME HEALTH SERVICES | Facility: HOME HEALTH | Age: 83
End: 2023-10-18
Payer: MEDICARE

## 2023-10-19 DIAGNOSIS — I50.42 CHRONIC COMBINED SYSTOLIC AND DIASTOLIC CONGESTIVE HEART FAILURE (MULTI): Primary | ICD-10-CM

## 2023-10-20 ENCOUNTER — HOME CARE VISIT (OUTPATIENT)
Dept: HOME HEALTH SERVICES | Facility: HOME HEALTH | Age: 83
End: 2023-10-20
Payer: MEDICARE

## 2023-10-20 VITALS
OXYGEN SATURATION: 97 % | DIASTOLIC BLOOD PRESSURE: 74 MMHG | SYSTOLIC BLOOD PRESSURE: 114 MMHG | HEART RATE: 61 BPM | TEMPERATURE: 97.5 F

## 2023-10-20 PROCEDURE — G0157 HHC PT ASSISTANT EA 15: HCPCS

## 2023-10-20 SDOH — HEALTH STABILITY: PHYSICAL HEALTH: PHYSICAL EXERCISE: 10

## 2023-10-20 SDOH — HEALTH STABILITY: PHYSICAL HEALTH: EXERCISE ACTIVITY: ANKLE PUMPS

## 2023-10-20 SDOH — HEALTH STABILITY: PHYSICAL HEALTH: EXERCISE ACTIVITY: HIP FLEXION

## 2023-10-20 SDOH — HEALTH STABILITY: PHYSICAL HEALTH: EXERCISE ACTIVITY: LONG ARC QUAD

## 2023-10-20 SDOH — HEALTH STABILITY: PHYSICAL HEALTH: PHYSICAL EXERCISE: SEATED

## 2023-10-20 SDOH — HEALTH STABILITY: PHYSICAL HEALTH: EXERCISE ACTIVITY: GLUTEAL SET

## 2023-10-20 SDOH — HEALTH STABILITY: PHYSICAL HEALTH: EXERCISE ACTIVITY: QUAD SET

## 2023-10-20 SDOH — HEALTH STABILITY: PHYSICAL HEALTH: PHYSICAL EXERCISE: SUPINE

## 2023-10-20 SDOH — HEALTH STABILITY: PHYSICAL HEALTH: EXERCISE ACTIVITY: KNEE ROCK SIDE TO SDIE

## 2023-10-20 SDOH — HEALTH STABILITY: PHYSICAL HEALTH: EXERCISE TYPE: SEATED, SUPINE

## 2023-10-20 ASSESSMENT — ACTIVITIES OF DAILY LIVING (ADL)
AMBULATION_DISTANCE/DURATION_TOLERATED: 50FT
AMBULATION ASSISTANCE ON FLAT SURFACES: 1

## 2023-10-20 ASSESSMENT — ENCOUNTER SYMPTOMS
HIGHEST PAIN SEVERITY IN PAST 24 HOURS: 6/10
PAIN SEVERITY GOAL: 0/10
LOWEST PAIN SEVERITY IN PAST 24 HOURS: 2/10

## 2023-10-23 ENCOUNTER — HOME CARE VISIT (OUTPATIENT)
Dept: HOME HEALTH SERVICES | Facility: HOME HEALTH | Age: 83
End: 2023-10-23
Payer: MEDICARE

## 2023-10-23 VITALS
DIASTOLIC BLOOD PRESSURE: 62 MMHG | OXYGEN SATURATION: 96 % | TEMPERATURE: 98.4 F | HEART RATE: 78 BPM | SYSTOLIC BLOOD PRESSURE: 112 MMHG

## 2023-10-23 DIAGNOSIS — K20.90 ESOPHAGITIS: ICD-10-CM

## 2023-10-23 PROCEDURE — G0157 HHC PT ASSISTANT EA 15: HCPCS

## 2023-10-23 SDOH — HEALTH STABILITY: PHYSICAL HEALTH: PHYSICAL EXERCISE: 20

## 2023-10-23 SDOH — HEALTH STABILITY: PHYSICAL HEALTH: PHYSICAL EXERCISE: 15

## 2023-10-23 SDOH — HEALTH STABILITY: PHYSICAL HEALTH: EXERCISE ACTIVITY: HIP FLEXION

## 2023-10-23 SDOH — HEALTH STABILITY: PHYSICAL HEALTH: PHYSICAL EXERCISE: SEATED

## 2023-10-23 SDOH — HEALTH STABILITY: PHYSICAL HEALTH: EXERCISE ACTIVITY: ANKLE PUMPS

## 2023-10-23 SDOH — HEALTH STABILITY: PHYSICAL HEALTH: EXERCISE ACTIVITY: QUAD SET

## 2023-10-23 SDOH — HEALTH STABILITY: PHYSICAL HEALTH: EXERCISE ACTIVITY: GLUTEAL SET

## 2023-10-23 SDOH — HEALTH STABILITY: PHYSICAL HEALTH: EXERCISE ACTIVITY: LONG ARC QUAD

## 2023-10-23 SDOH — HEALTH STABILITY: PHYSICAL HEALTH: EXERCISE TYPE: SEATED, SUPINE

## 2023-10-23 ASSESSMENT — ENCOUNTER SYMPTOMS: DENIES PAIN: 1

## 2023-10-23 ASSESSMENT — ACTIVITIES OF DAILY LIVING (ADL)
AMBULATION ASSISTANCE ON FLAT SURFACES: 1
AMBULATION_DISTANCE/DURATION_TOLERATED: 125

## 2023-10-24 RX ORDER — PANTOPRAZOLE SODIUM 40 MG/1
TABLET, DELAYED RELEASE ORAL
Qty: 180 TABLET | Refills: 1 | Status: SHIPPED | OUTPATIENT
Start: 2023-10-24 | End: 2024-04-20

## 2023-10-25 ENCOUNTER — APPOINTMENT (OUTPATIENT)
Dept: HOME HEALTH SERVICES | Facility: HOME HEALTH | Age: 83
End: 2023-10-25
Payer: MEDICARE

## 2023-10-26 ENCOUNTER — OFFICE VISIT (OUTPATIENT)
Dept: PRIMARY CARE | Facility: CLINIC | Age: 83
End: 2023-10-26
Payer: MEDICARE

## 2023-10-26 VITALS
TEMPERATURE: 97 F | HEART RATE: 78 BPM | OXYGEN SATURATION: 97 % | HEIGHT: 60 IN | WEIGHT: 134.6 LBS | SYSTOLIC BLOOD PRESSURE: 111 MMHG | DIASTOLIC BLOOD PRESSURE: 61 MMHG | BODY MASS INDEX: 26.42 KG/M2

## 2023-10-26 DIAGNOSIS — N18.30 STAGE 3 CHRONIC KIDNEY DISEASE, UNSPECIFIED WHETHER STAGE 3A OR 3B CKD (MULTI): ICD-10-CM

## 2023-10-26 DIAGNOSIS — N39.0 ACUTE UTI: ICD-10-CM

## 2023-10-26 DIAGNOSIS — I50.22 CHRONIC SYSTOLIC HEART FAILURE (MULTI): ICD-10-CM

## 2023-10-26 DIAGNOSIS — M47.816 FACET DEGENERATION OF LUMBAR REGION: Primary | ICD-10-CM

## 2023-10-26 DIAGNOSIS — I48.19 PERSISTENT ATRIAL FIBRILLATION (MULTI): ICD-10-CM

## 2023-10-26 DIAGNOSIS — J84.9 ILD (INTERSTITIAL LUNG DISEASE) (MULTI): ICD-10-CM

## 2023-10-26 PROBLEM — E11.9 DIABETES MELLITUS (MULTI): Status: RESOLVED | Noted: 2023-09-08 | Resolved: 2023-10-26

## 2023-10-26 PROBLEM — Z95.2 S/P AORTIC VALVE REPLACEMENT: Status: RESOLVED | Noted: 2023-09-08 | Resolved: 2023-10-26

## 2023-10-26 PROBLEM — Z87.19 HISTORY OF GI BLEED: Status: RESOLVED | Noted: 2023-09-08 | Resolved: 2023-10-26

## 2023-10-26 LAB
POC APPEARANCE, URINE: ABNORMAL
POC BILIRUBIN, URINE: NEGATIVE
POC BLOOD, URINE: ABNORMAL
POC COLOR, URINE: YELLOW
POC GLUCOSE, URINE: NEGATIVE MG/DL
POC KETONES, URINE: NEGATIVE MG/DL
POC LEUKOCYTES, URINE: ABNORMAL
POC NITRITE,URINE: POSITIVE
POC PH, URINE: 5.5 PH
POC PROTEIN, URINE: ABNORMAL MG/DL
POC SPECIFIC GRAVITY, URINE: 1.01
POC UROBILINOGEN, URINE: 0.2 EU/DL

## 2023-10-26 PROCEDURE — 1036F TOBACCO NON-USER: CPT | Performed by: FAMILY MEDICINE

## 2023-10-26 PROCEDURE — 87086 URINE CULTURE/COLONY COUNT: CPT

## 2023-10-26 PROCEDURE — 1126F AMNT PAIN NOTED NONE PRSNT: CPT | Performed by: FAMILY MEDICINE

## 2023-10-26 PROCEDURE — 81003 URINALYSIS AUTO W/O SCOPE: CPT | Performed by: FAMILY MEDICINE

## 2023-10-26 PROCEDURE — 3074F SYST BP LT 130 MM HG: CPT | Performed by: FAMILY MEDICINE

## 2023-10-26 PROCEDURE — 1160F RVW MEDS BY RX/DR IN RCRD: CPT | Performed by: FAMILY MEDICINE

## 2023-10-26 PROCEDURE — 99214 OFFICE O/P EST MOD 30 MIN: CPT | Performed by: FAMILY MEDICINE

## 2023-10-26 PROCEDURE — 1159F MED LIST DOCD IN RCRD: CPT | Performed by: FAMILY MEDICINE

## 2023-10-26 PROCEDURE — 3078F DIAST BP <80 MM HG: CPT | Performed by: FAMILY MEDICINE

## 2023-10-26 RX ORDER — NITROFURANTOIN 25; 75 MG/1; MG/1
100 CAPSULE ORAL 2 TIMES DAILY
Qty: 14 CAPSULE | Refills: 0 | Status: SHIPPED | OUTPATIENT
Start: 2023-10-26 | End: 2023-11-06 | Stop reason: ALTCHOICE

## 2023-10-26 RX ORDER — TIZANIDINE 2 MG/1
2 TABLET ORAL EVERY 12 HOURS PRN
Qty: 60 TABLET | Refills: 1 | Status: SHIPPED | OUTPATIENT
Start: 2023-10-26 | End: 2024-02-23 | Stop reason: SDUPTHER

## 2023-10-26 ASSESSMENT — ENCOUNTER SYMPTOMS
LOSS OF SENSATION IN FEET: 0
OCCASIONAL FEELINGS OF UNSTEADINESS: 0
DEPRESSION: 0

## 2023-10-26 ASSESSMENT — PATIENT HEALTH QUESTIONNAIRE - PHQ9
1. LITTLE INTEREST OR PLEASURE IN DOING THINGS: NOT AT ALL
2. FEELING DOWN, DEPRESSED OR HOPELESS: NOT AT ALL
SUM OF ALL RESPONSES TO PHQ9 QUESTIONS 1 AND 2: 0

## 2023-10-26 NOTE — PROGRESS NOTES
Subjective   Patient ID: Shaina Iverson is a 82 y.o. female who presents for Follow-up (Hospital follow up/Entire body hurts for about 10 days).    HPI  Here for follow up of hospitalization on 10/8-10/11 for low back pain. Patient found to have a UTI as well had scans done which showed degenerative disc disease. Patient finished the cefazolin, still has UTI on today's urinalysis.   Also stool test came back positive for c-diff, however patient doesn't have or never had diarrhea, nor does not remember when she had a stool test   Have diffuse muscle ache, feeling tired.   Has ILD, following with pulmonology  Has stage 3 CKD, stable  Has CHF and A-fib, following with cardiology. No chest pain, palpitations, LE edema    Review of Systems  General: no fever  Eyes: no blurry vision  ENT: no sore throat, no ear pain  Resp: no cough, sob or wheezing  Cardio: no chest pain, no palpitations  Abd: see HPI  : see HPI      /61   Pulse 78   Temp 36.1 °C (97 °F)   Ht 1.524 m (5')   Wt 61.1 kg (134 lb 9.6 oz)   SpO2 97%   BMI 26.29 kg/m²       Objective   Physical Exam  Gen: NAD, alert  Head: normocephalic/atraumatic  Eyes: conjunctivae normal  Ears: canals clear bilaterally, TM normal   Nose: external nose normal   Oropharynx: clear   Resp: Clear to auscultation  CVS: Regular rate and rhythm  Abdomen: soft, NT, ND  Ext: no edema, NT of lower extremities       Assessment/Plan   Problem List Items Addressed This Visit       Atrial fibrillation (CMS/[facility])  Continue follow up with cardiology    Facet degeneration of lumbar region - Primary    Relevant Medications    tiZANidine (Zanaflex) 2 mg tablet    Other Relevant Orders    Disability Placard    ILD (interstitial lung disease) (CMS/[facility])  Continue follow up with pulmonology    Stage 3 chronic kidney disease (CMS/[facility])  stable    Heart failure (CMS/[facility])  Continue follow up with cardiology     Other Visit Diagnoses       Acute UTI        Relevant Medications     nitrofurantoin, macrocrystal-monohydrate, (Macrobid) 100 mg capsule    Other Relevant Orders    POCT UA Automated manually resulted (Completed)    Urine Culture

## 2023-10-27 ENCOUNTER — HOME CARE VISIT (OUTPATIENT)
Dept: HOME HEALTH SERVICES | Facility: HOME HEALTH | Age: 83
End: 2023-10-27
Payer: MEDICARE

## 2023-10-27 VITALS
TEMPERATURE: 99 F | DIASTOLIC BLOOD PRESSURE: 62 MMHG | SYSTOLIC BLOOD PRESSURE: 114 MMHG | HEART RATE: 66 BPM | OXYGEN SATURATION: 99 %

## 2023-10-27 PROBLEM — Z96.649 HISTORY OF TOTAL HIP REPLACEMENT: Status: RESOLVED | Noted: 2023-09-08 | Resolved: 2023-10-27

## 2023-10-27 PROBLEM — J44.9 CHRONIC OBSTRUCTIVE LUNG DISEASE (MULTI): Status: RESOLVED | Noted: 2023-09-08 | Resolved: 2023-10-27

## 2023-10-27 PROBLEM — N18.9 CHRONIC KIDNEY DISEASE: Status: RESOLVED | Noted: 2023-09-08 | Resolved: 2023-10-27

## 2023-10-27 PROBLEM — M54.50 CHRONIC LOW BACK PAIN: Status: RESOLVED | Noted: 2023-02-01 | Resolved: 2023-10-27

## 2023-10-27 PROBLEM — Z96.641 STATUS POST RIGHT HIP REPLACEMENT: Status: RESOLVED | Noted: 2023-09-08 | Resolved: 2023-10-27

## 2023-10-27 PROBLEM — I50.42 CHRONIC COMBINED SYSTOLIC AND DIASTOLIC CONGESTIVE HEART FAILURE (MULTI): Status: RESOLVED | Noted: 2023-02-01 | Resolved: 2023-10-27

## 2023-10-27 PROBLEM — G89.29 CHRONIC LOW BACK PAIN: Status: RESOLVED | Noted: 2023-02-01 | Resolved: 2023-10-27

## 2023-10-27 PROBLEM — M16.11 ARTHROPATHY OF RIGHT HIP: Status: RESOLVED | Noted: 2023-02-01 | Resolved: 2023-10-27

## 2023-10-27 PROBLEM — M54.40 BACK PAIN OF LUMBAR REGION WITH SCIATICA: Status: RESOLVED | Noted: 2023-10-09 | Resolved: 2023-10-27

## 2023-10-27 PROBLEM — R31.9 HEMATURIA: Status: RESOLVED | Noted: 2023-02-01 | Resolved: 2023-10-27

## 2023-10-27 PROBLEM — R31.0 GROSS HEMATURIA: Status: RESOLVED | Noted: 2023-09-08 | Resolved: 2023-10-27

## 2023-10-27 PROCEDURE — G0157 HHC PT ASSISTANT EA 15: HCPCS

## 2023-10-27 SDOH — HEALTH STABILITY: PHYSICAL HEALTH: PHYSICAL EXERCISE: SEATED

## 2023-10-27 SDOH — HEALTH STABILITY: PHYSICAL HEALTH: EXERCISE TYPE: SEATED, SUPINE

## 2023-10-27 SDOH — HEALTH STABILITY: PHYSICAL HEALTH: EXERCISE ACTIVITY: LONG ARC QUAD

## 2023-10-27 SDOH — HEALTH STABILITY: PHYSICAL HEALTH

## 2023-10-27 SDOH — HEALTH STABILITY: PHYSICAL HEALTH: PHYSICAL EXERCISE: SUPINE

## 2023-10-27 SDOH — HEALTH STABILITY: PHYSICAL HEALTH: PHYSICAL EXERCISE: 15

## 2023-10-27 SDOH — HEALTH STABILITY: PHYSICAL HEALTH: EXERCISE ACTIVITY: HIP FLEXION

## 2023-10-27 SDOH — HEALTH STABILITY: PHYSICAL HEALTH: EXERCISE ACTIVITY: ANKLE PUMPS

## 2023-10-27 SDOH — HEALTH STABILITY: PHYSICAL HEALTH: EXERCISE ACTIVITY: HIP FLEXION STRETCH

## 2023-10-27 SDOH — HEALTH STABILITY: PHYSICAL HEALTH: EXERCISE ACTIVITIES SETS: 2

## 2023-10-27 SDOH — HEALTH STABILITY: PHYSICAL HEALTH: PHYSICAL EXERCISE: 5

## 2023-10-27 ASSESSMENT — ENCOUNTER SYMPTOMS
PAIN: 1
LOWEST PAIN SEVERITY IN PAST 24 HOURS: 3/10
SUBJECTIVE PAIN PROGRESSION: WAXING AND WANING
PAIN LOCATION: LEFT HIP
PAIN LOCATION: BACK
HIGHEST PAIN SEVERITY IN PAST 24 HOURS: 5/10

## 2023-10-27 ASSESSMENT — ACTIVITIES OF DAILY LIVING (ADL)
AMBULATION_DISTANCE/DURATION_TOLERATED: 65FT
AMBULATION ASSISTANCE ON FLAT SURFACES: 1

## 2023-10-28 LAB — BACTERIA UR CULT: ABNORMAL

## 2023-10-30 ENCOUNTER — APPOINTMENT (OUTPATIENT)
Dept: HOME HEALTH SERVICES | Facility: HOME HEALTH | Age: 83
End: 2023-10-30
Payer: MEDICARE

## 2023-10-30 ENCOUNTER — APPOINTMENT (OUTPATIENT)
Dept: GASTROENTEROLOGY | Facility: CLINIC | Age: 83
End: 2023-10-30
Payer: MEDICARE

## 2023-10-31 ENCOUNTER — HOME CARE VISIT (OUTPATIENT)
Dept: HOME HEALTH SERVICES | Facility: HOME HEALTH | Age: 83
End: 2023-10-31
Payer: MEDICARE

## 2023-10-31 VITALS
OXYGEN SATURATION: 96 % | TEMPERATURE: 98.3 F | SYSTOLIC BLOOD PRESSURE: 130 MMHG | DIASTOLIC BLOOD PRESSURE: 78 MMHG | HEART RATE: 95 BPM

## 2023-10-31 DIAGNOSIS — I48.19 PERSISTENT ATRIAL FIBRILLATION (MULTI): ICD-10-CM

## 2023-10-31 DIAGNOSIS — D50.9 IRON DEFICIENCY ANEMIA, UNSPECIFIED IRON DEFICIENCY ANEMIA TYPE: ICD-10-CM

## 2023-10-31 DIAGNOSIS — N39.0 ACUTE UTI: Primary | ICD-10-CM

## 2023-10-31 PROCEDURE — G0157 HHC PT ASSISTANT EA 15: HCPCS

## 2023-10-31 RX ORDER — LANOLIN ALCOHOL/MO/W.PET/CERES
1 CREAM (GRAM) TOPICAL DAILY
Qty: 30 TABLET | Refills: 5 | Status: SHIPPED | OUTPATIENT
Start: 2023-10-31 | End: 2024-04-20

## 2023-10-31 RX ORDER — CLOPIDOGREL BISULFATE 75 MG/1
75 TABLET ORAL DAILY
Qty: 30 TABLET | Refills: 5 | Status: SHIPPED | OUTPATIENT
Start: 2023-10-31 | End: 2024-05-07

## 2023-10-31 SDOH — HEALTH STABILITY: PHYSICAL HEALTH: PHYSICAL EXERCISE: 15

## 2023-10-31 SDOH — HEALTH STABILITY: PHYSICAL HEALTH: EXERCISE ACTIVITY: ANKLE PUMPS

## 2023-10-31 SDOH — HEALTH STABILITY: PHYSICAL HEALTH: EXERCISE ACTIVITY: HIP FLEXION STRETCH

## 2023-10-31 SDOH — HEALTH STABILITY: PHYSICAL HEALTH: PHYSICAL EXERCISE: SEATED

## 2023-10-31 SDOH — HEALTH STABILITY: PHYSICAL HEALTH: PHYSICAL EXERCISE: 5

## 2023-10-31 SDOH — HEALTH STABILITY: PHYSICAL HEALTH: PHYSICAL EXERCISE: SUPINE

## 2023-10-31 SDOH — HEALTH STABILITY: PHYSICAL HEALTH: EXERCISE ACTIVITY: LONG ARC QUAD

## 2023-10-31 SDOH — HEALTH STABILITY: PHYSICAL HEALTH: EXERCISE TYPE: SEATED, SUPINE

## 2023-10-31 SDOH — HEALTH STABILITY: PHYSICAL HEALTH: EXERCISE ACTIVITY: HIP FLEXION

## 2023-10-31 ASSESSMENT — ENCOUNTER SYMPTOMS
MUSCLE WEAKNESS: 1
LOWEST PAIN SEVERITY IN PAST 24 HOURS: 3/10
HIGHEST PAIN SEVERITY IN PAST 24 HOURS: 9/10
PAIN: 1
SUBJECTIVE PAIN PROGRESSION: UNCHANGED
ARTHRALGIAS: 1

## 2023-10-31 ASSESSMENT — ACTIVITIES OF DAILY LIVING (ADL): AMBULATION ASSISTANCE ON FLAT SURFACES: 1

## 2023-11-02 ENCOUNTER — HOME CARE VISIT (OUTPATIENT)
Dept: HOME HEALTH SERVICES | Facility: HOME HEALTH | Age: 83
End: 2023-11-02
Payer: MEDICARE

## 2023-11-02 VITALS
TEMPERATURE: 97.4 F | DIASTOLIC BLOOD PRESSURE: 64 MMHG | SYSTOLIC BLOOD PRESSURE: 112 MMHG | HEART RATE: 85 BPM | OXYGEN SATURATION: 98 %

## 2023-11-02 PROCEDURE — G0180 MD CERTIFICATION HHA PATIENT: HCPCS

## 2023-11-02 PROCEDURE — G0157 HHC PT ASSISTANT EA 15: HCPCS

## 2023-11-02 SDOH — HEALTH STABILITY: PHYSICAL HEALTH: PHYSICAL EXERCISE: SEATED

## 2023-11-02 SDOH — HEALTH STABILITY: PHYSICAL HEALTH: PHYSICAL EXERCISE: 20

## 2023-11-02 SDOH — HEALTH STABILITY: PHYSICAL HEALTH: EXERCISE ACTIVITY: HIP FLEXION

## 2023-11-02 SDOH — HEALTH STABILITY: PHYSICAL HEALTH: EXERCISE ACTIVITY: LONG ARC QUAD

## 2023-11-02 SDOH — HEALTH STABILITY: PHYSICAL HEALTH: PHYSICAL EXERCISE: 10

## 2023-11-02 SDOH — HEALTH STABILITY: PHYSICAL HEALTH: EXERCISE ACTIVITY: ANKLE PUMPS

## 2023-11-02 SDOH — HEALTH STABILITY: PHYSICAL HEALTH: EXERCISE TYPE: SEATED

## 2023-11-02 ASSESSMENT — ACTIVITIES OF DAILY LIVING (ADL)
AMBULATION_DISTANCE/DURATION_TOLERATED: 50FT X2
AMBULATION ASSISTANCE ON FLAT SURFACES: 1

## 2023-11-02 ASSESSMENT — ENCOUNTER SYMPTOMS
PAIN: 1
HIGHEST PAIN SEVERITY IN PAST 24 HOURS: 6/10
PAIN SEVERITY GOAL: 0/10
LOWEST PAIN SEVERITY IN PAST 24 HOURS: 3/10
SUBJECTIVE PAIN PROGRESSION: WAXING AND WANING

## 2023-11-06 ENCOUNTER — OFFICE VISIT (OUTPATIENT)
Dept: NEPHROLOGY | Facility: CLINIC | Age: 83
End: 2023-11-06
Payer: MEDICARE

## 2023-11-06 ENCOUNTER — HOME CARE VISIT (OUTPATIENT)
Dept: HOME HEALTH SERVICES | Facility: HOME HEALTH | Age: 83
End: 2023-11-06
Payer: MEDICARE

## 2023-11-06 VITALS
HEART RATE: 97 BPM | HEIGHT: 64 IN | SYSTOLIC BLOOD PRESSURE: 123 MMHG | DIASTOLIC BLOOD PRESSURE: 80 MMHG | RESPIRATION RATE: 16 BRPM | OXYGEN SATURATION: 97 % | TEMPERATURE: 97.5 F | BODY MASS INDEX: 22.23 KG/M2 | WEIGHT: 130.2 LBS

## 2023-11-06 VITALS
HEART RATE: 83 BPM | OXYGEN SATURATION: 99 % | DIASTOLIC BLOOD PRESSURE: 60 MMHG | SYSTOLIC BLOOD PRESSURE: 110 MMHG | TEMPERATURE: 98.7 F

## 2023-11-06 DIAGNOSIS — I10 BENIGN ESSENTIAL HTN: ICD-10-CM

## 2023-11-06 DIAGNOSIS — I50.20 SYSTOLIC HEART FAILURE, UNSPECIFIED HF CHRONICITY (MULTI): ICD-10-CM

## 2023-11-06 DIAGNOSIS — N18.32 STAGE 3B CHRONIC KIDNEY DISEASE (MULTI): Primary | ICD-10-CM

## 2023-11-06 PROCEDURE — G0157 HHC PT ASSISTANT EA 15: HCPCS

## 2023-11-06 PROCEDURE — 1036F TOBACCO NON-USER: CPT | Performed by: INTERNAL MEDICINE

## 2023-11-06 PROCEDURE — 99215 OFFICE O/P EST HI 40 MIN: CPT | Performed by: INTERNAL MEDICINE

## 2023-11-06 PROCEDURE — 3078F DIAST BP <80 MM HG: CPT | Performed by: INTERNAL MEDICINE

## 2023-11-06 PROCEDURE — 1160F RVW MEDS BY RX/DR IN RCRD: CPT | Performed by: INTERNAL MEDICINE

## 2023-11-06 PROCEDURE — 3074F SYST BP LT 130 MM HG: CPT | Performed by: INTERNAL MEDICINE

## 2023-11-06 PROCEDURE — 1126F AMNT PAIN NOTED NONE PRSNT: CPT | Performed by: INTERNAL MEDICINE

## 2023-11-06 PROCEDURE — 1159F MED LIST DOCD IN RCRD: CPT | Performed by: INTERNAL MEDICINE

## 2023-11-06 SDOH — HEALTH STABILITY: PHYSICAL HEALTH: EXERCISE TYPE: SEATED

## 2023-11-06 SDOH — HEALTH STABILITY: PHYSICAL HEALTH: EXERCISE ACTIVITY: HIP FLEXION

## 2023-11-06 SDOH — HEALTH STABILITY: PHYSICAL HEALTH: EXERCISE ACTIVITY: LONG ARC QUAD

## 2023-11-06 SDOH — HEALTH STABILITY: PHYSICAL HEALTH: PHYSICAL EXERCISE: 15

## 2023-11-06 SDOH — HEALTH STABILITY: PHYSICAL HEALTH: EXERCISE ACTIVITY: ANKLE PUMPS

## 2023-11-06 SDOH — HEALTH STABILITY: PHYSICAL HEALTH: PHYSICAL EXERCISE: STANDING

## 2023-11-06 SDOH — HEALTH STABILITY: PHYSICAL HEALTH: PHYSICAL EXERCISE: SEATED

## 2023-11-06 SDOH — HEALTH STABILITY: PHYSICAL HEALTH: EXERCISE ACTIVITY: TOE RAISE

## 2023-11-06 SDOH — HEALTH STABILITY: PHYSICAL HEALTH: EXERCISE ACTIVITY: KNEE FLEXION

## 2023-11-06 SDOH — HEALTH STABILITY: PHYSICAL HEALTH: PHYSICAL EXERCISE: 5

## 2023-11-06 ASSESSMENT — ENCOUNTER SYMPTOMS
PAIN: 1
PAIN SEVERITY GOAL: 0/10
LOWEST PAIN SEVERITY IN PAST 24 HOURS: 1/10
SUBJECTIVE PAIN PROGRESSION: GRADUALLY IMPROVING
HIGHEST PAIN SEVERITY IN PAST 24 HOURS: 3/10
PAIN LOCATION: BACK
PAIN LOCATION - PAIN SEVERITY: 1/10

## 2023-11-06 ASSESSMENT — ACTIVITIES OF DAILY LIVING (ADL): AMBULATION_DISTANCE/DURATION_TOLERATED: 50FT X2

## 2023-11-06 NOTE — PROGRESS NOTES
Subjective       Ms Iverson is a 82-year-old female with past medical history of CKD stage III, anemia, A. fib, congestive heart failure, hypertension, coronary artery disease, GI bleed on PPI, hypomagnesia and hypokalemia, asthma, recurrent UTI, aortic valve stenosis status post replacement who is coming to see me today for CKD follow-up.    Last visit April 2023. Today pt is here with her son Javi. She was in the hospital 10/2023 for back pain. She was found to be c diff positive, she states she did not take PO abx for this. Saw her PCP after. Has intermittent diarrhea. Reviewed CT imaging which showed normal appearing kidneys.   Her BP is good this visit. Denies checking it at home, though has machine. Continues to not take Farxiga. Takes torsemide 2 pills daily and 4 pills on Thursdays. Denies dysuria,  symptoms.       Per prior note     Last office visit December 2022. At that time we started allopurinol 100 mg po qd for elevated uric acid. Since then, she reports doing well. She recently underwent left total hip arthroplasty and otherwise has no complaints or concerns. Today, she is accompanied by her son. /68 today. She has not had interval blood work, but will obtain today. We will call and update her with results and any changes to management. She has continued off of Farxiga 2/2 past DAVID and hypotension. She continues on iron supplementation for iron deficiency without anemia. She continues on magnesium supplements. She has not had significant leg swelling, shortness of breath or weight gain. She watches the salt in her diet.      Per prior notes     Last office visit November 2022. She came initially to see me for worsening kidney function. We had some concerns about hypertension over medications. We adjusted her blood pressure. In the interim, she feels well. She came today with her son. No complaints or concerns. Blood pressure is excellent. She had recent blood work done and all results were  discussed with them in details including improved serum creatinine 1.3 and GFR back to baseline 40. With normal electrolytes. She has no anemia. Blood work showed iron deficiency. She continues to be on iron supplement. She continues to be on magnesium supplements. She still has significant hip pain with plans for possible hip surgery. She is a wheelchair-bound at this time. No leg swelling or shortness of breath. No significant weight gain since last office visit. She watches salt in diet. No history of gout     Per prior notes     Shaina came today with her son. She reports frequent UTIs. She had frequent rounds of antibiotic recently. She is not aware of history of chronic kidney disease. She does not check blood pressure at home. She denies recent history of NSAID use however she had remote history of ibuprofen intake. She suffers from chronic hip pain and she was hoping for hip surgery clearance this coming week. She has A. fib and she is undergoing pacemaker replacement later this week. She reports good urine output. No lower urine tract symptoms. No leg swelling or shortness of breath. She does not follow specific diet. She never had kidney stones in the past. She suffered from GI bleed for which she is on chronic PPI. She denies blood or foam in the urine.     Family history: Mom had multiple kidney issues-unknown exact disease. No one in the family is on dialysis  Social history: Non-smoker, no drugs  Surgical history: Irrelevant      Review of Systems     Constitutional: no fever,~no chills,~no recent weight gain~and~no recent weight loss.   Eyes: eyesight problems, but~no blurred vision~and~no diplopia.   ENT: no nasal discharge, ~no hearing loss,~no earache,~no sore throat~and~no swollen glands in the neck.   Cardiovascular: no chest pain,~no palpitations~and~no lower extremity edema.   Respiratory: no shortness of breath,~no chronic cough~and~no shortness of breath during exertion.   Gastrointestinal:  "diarrhea, no abdominal pain,~no constipation,~no heartburn,~no vomiting,~no bloody stools  Genitourinary: no dysuria~and~no hematuria.   Musculoskeletal: no arthralgias~and~difficulty walking, but~no myalgias.   Skin: no rashes~and~no skin lesions.   Neurological: no headaches~and~no dizziness.   Psychiatric: no confusion,~no depression~and~no anxiety.   Endocrine: no heat intolerance,~no cold intolerance,~appetite not increased,~no thyroid disorder,~no increased urinary frequency~and~no dry skin.   Hematologic/Lymphatic: does not bleed easily~and~does not bruise easily.   All other systems have been reviewed and are negative for complaint.        Objective   /80 (Patient Position: Standing)   Pulse 97   Temp 36.4 °C (97.5 °F)   Resp 16   Ht 1.626 m (5' 4\")   Wt 59.1 kg (130 lb 3.2 oz)   SpO2 97%   BMI 22.35 kg/m²   Wt Readings from Last 3 Encounters:   11/06/23 59.1 kg (130 lb 3.2 oz)   10/26/23 61.1 kg (134 lb 9.6 oz)   10/09/23 60.8 kg (134 lb 0.6 oz)       Physical Exam    General appearance: no distress awake and alert on room air, euvolemic on exam  Eyes: non-icteric  HEENT: atrumatic head, PEERLA, moist mucosa  Skin: no apparent rash  Heart: NSR, S1, S2 normal, no murmur or gallop  Lungs: Symmetrical expansion,CTA bilat no wheezing/crackles  Abdomen: soft, nt/nd, obese  Extremities: no edema bilat  Neuro: No FND,asterixis, no focal deficits noticed        Data Review                     No lab exists for component: \"LABALBU\"    Lab Results   Component Value Date    URICACID 7.5 (H) 10/04/2023       No components found for: \"[id number]\"      Lab Results   Component Value Date    HGBA1C 5.0 11/03/2022         Lab Results   Component Value Date    CALCIUM 8.9 10/11/2023    PHOS 3.5 10/04/2023         No results found for requested labs within last 365 days.    Recent Labs     10/11/23  0435 10/10/23  0454 10/09/23  0517 10/08/23  2009 10/04/23  1546 07/14/23  1823 07/07/23  1913 06/14/23  1519 " 04/06/23  1510 01/24/23  0120 01/23/23  0452 01/22/23  0449 01/21/23  0411 01/20/23  0512   * 134* 133* 135* 135* 136 137   < > 137 138 139 138 134 124*   K 4.1 4.2 3.9 4.2 4.0 4.0 4.3   < > 4.6 3.9 3.7 3.9 4.1 4.3   CO2 23 23 26 27 25 27 24   < > 26 27 24 23* 25 25   BUN 42* 40* 37* 42* 35* 24* 28*   < > 34* 27* 29* 39* 50* 53*   GLUCOSE 94 124* 96 96 88 88 91   < > 72* 93 123* 94 75 100*   CALCIUM 8.9 9.0 8.8 9.6 8.9 9.3 8.9   < > 9.7 9.1 9.3 9.6 8.8 7.9*   PHOS  --   --   --   --  3.5  --   --   --  3.6 1.7* 1.7* 2.0* 3.3 3.9   CREATININE 1.40* 1.60* 1.66* 1.95* 1.61* 1.33* 1.37*   < > 1.26* 1.0 1.0 1.2 1.8* 2.6*   EGFR 38* 32* 31* 26* 32*  --   --   --   --  57 57 46 28 18   ANIONGAP 13 16 10 15 14 13 13   < > 16 11 13 16 11 10    < > = values in this interval not displayed.         Assessment and Plan     Ms Iverson is a 82-year-old female with past medical history of CKD stage III, anemia, A. fib, congestive heart failure, hypertension, coronary artery disease, GI bleed on PPI, hypomagnesia and hypokalemia, asthma, recurrent UTI, aortic valve stenosis status post replacement who is coming to see me today for CKD follow-up.     Impression  #Chronic kidney disease stage III  Baseline serum creatinine 1.1-1.3, baseline GFR 40-50 mill per minute per 1.73 mÂ² up until October 2022. Possibly atherosclerotic cardiovascular disease and cardiorenal syndrome type II. Kidney ultrasound done in November 2022 was reviewed and showed shrunk kidney bilateral consistent with history of chronic kidney disease--albeit, right kidney 7.6 cm, left kidney 8.4 cm with cyst 1.7 cm x 1.7 cm. No obstruction. In October 2023, serum creatinine is 1.4 and GFR 38 mils per minute per 1.73 mÂ²  - labs for next visit      #Worsened renal function prior to Dec 2022 visit, improved   - Per prior note: Started October 2022 serum creatinine has been worsening last 2.5 and GFR 18 mill per minute per 1.73 mÂ² in November 2022. Differential  diagnoses included acute interstitial nephritis in the setting of multiple antibiotic rounds. Urine earlier showed large leukocyte esterase, +1 protein, moderate blood. Prior urine culture recently is negative. She is asymptomatic with no pain or burning with urination. She has been on PPI for a long time for melena - I am hesitant to hold at this time. Since Dec 2022, kidney function improved to baseline. I will followed conservative measures and adjusted blood pressure medication.   - improved kidney function 10/2023. Repeat labs next visit      #Hypertension - well-controlled  - blood pressure is excellent.   - Currently on valsartan 40 mg qd, spironolactone 25 mg every day, torsemide daily (20 mg x2, thursdays 20 mg x4)   - Continue holding Farxiga. In the spite of evidence of benefits from SGL 2 inhibitor intake, recurrent UTI might be limiting use of SGLT2 inhibitors.   - no changes 11/6/2023      #Hyperuricemia   - no history of gout  - currently on allopurinol 100 mg qd   - Continue low animal-based protein diet  - uric acid elevated at 7.5 10/2023. Recheck next visit      #CHF/A. fib-currently on diuretics/RAAS inhibitor-off SGL 2 inhibitor. Not in exacerbation at this time.   -torsemide daily (20 mg x2, thursdays 20 mg x4). Continue same     #chronic iron deficiency anemia - hemoglobin 10.6 10/2023. Iron deficiency-counseled regarding iron tablets, taking with citrus, and using stool softeners as needed to limit constipation.     #Hypomagnesemia, hypokalemia  - magnesium low 1/2023 at 1.5. potassium WNL   - recheck labs     #CKD/MBD  - repeat calcium, phosphorus, PTH and vitamin D labs     Follow-up in 6 months with repeat blood work and urinalysis prior to next visit     Leatha Greenberg DO PGY-2     Anne Pat MD, MS, BENNIE YUNG  Clinical  - [facility] [employer]  Nephrologist - [facility] - [facility]

## 2023-11-06 NOTE — PATIENT INSTRUCTIONS
Dear BAO   It was nice seeing you in the nephrology clinic today     Today we discussed the following:     #Chronic kidney disease stage III your baseline kidney function 40-50%-relatively stable     #Blood pressure is excellent today     #Recent worsening of kidney function in the last few months kidney function is down to 18%. Now your kidney function is back up again to 40% which is your baseline. Continue all same. You are due to repeat blood work today     -Continue to hold Farxiga  -Continue decreased dose valsartan 40 mg  -Continue decreased dose of spironolactone 25 mg once daily  -Continue torsemide 20 mg 2 times daily except Thursdays take 4 times daily     #Y you are doing well after hip surgery     #Uric acid is elevated-continue allopurinol 100 mg. We will check uric acid with follow-up     #Your iron is low-continue to take iron pills with vitamin C (lemonade or orange juice) to increase absorption       Follow-up in 6 month with repeat blood work and urinalysis prior to next visit     Please call our office if you have any question  Thank you for coming to see me today     Anne Pat MD, MS, BENNIE YUNG  Clinical  - [facility] [employer]  Nephrologist - [facility] - [facility]

## 2023-11-08 ENCOUNTER — HOME CARE VISIT (OUTPATIENT)
Dept: HOME HEALTH SERVICES | Facility: HOME HEALTH | Age: 83
End: 2023-11-08
Payer: MEDICARE

## 2023-11-08 PROCEDURE — G0151 HHCP-SERV OF PT,EA 15 MIN: HCPCS

## 2023-11-08 ASSESSMENT — ENCOUNTER SYMPTOMS
PAIN LOCATION - PAIN DURATION: CHRONIC
PAIN SEVERITY GOAL: 0/10
SUBJECTIVE PAIN PROGRESSION: RAPIDLY IMPROVING
PAIN: 1
PAIN LOCATION - PAIN QUALITY: ACHING
PAIN LOCATION - PAIN SEVERITY: 2/10
LOWEST PAIN SEVERITY IN PAST 24 HOURS: 0/10
PAIN LOCATION: BACK
HIGHEST PAIN SEVERITY IN PAST 24 HOURS: 4/10
PAIN LOCATION - PAIN FREQUENCY: CONSTANT
PAIN LOCATION - RELIEVING FACTORS: REST, HEAT
PERSON REPORTING PAIN: PATIENT

## 2023-11-08 ASSESSMENT — ACTIVITIES OF DAILY LIVING (ADL)
AMBULATION ASSISTANCE ON FLAT SURFACES: 1
OASIS_M1830: 02

## 2023-11-09 ASSESSMENT — ACTIVITIES OF DAILY LIVING (ADL): HOME_HEALTH_OASIS: 01

## 2023-11-10 ENCOUNTER — PATIENT OUTREACH (OUTPATIENT)
Dept: PRIMARY CARE | Facility: CLINIC | Age: 83
End: 2023-11-10

## 2023-11-11 DIAGNOSIS — I48.91 ATRIAL FIBRILLATION, UNSPECIFIED TYPE (MULTI): ICD-10-CM

## 2023-11-13 RX ORDER — APIXABAN 2.5 MG/1
TABLET, FILM COATED ORAL
Qty: 60 TABLET | Refills: 3 | Status: SHIPPED | OUTPATIENT
Start: 2023-11-13 | End: 2024-03-14

## 2023-11-14 ENCOUNTER — HOSPITAL ENCOUNTER (OUTPATIENT)
Dept: RADIOLOGY | Facility: HOSPITAL | Age: 83
Discharge: HOME | End: 2023-11-14
Payer: MEDICARE

## 2023-11-14 DIAGNOSIS — R91.1 SOLITARY PULMONARY NODULE: ICD-10-CM

## 2023-11-14 PROCEDURE — 71250 CT THORAX DX C-: CPT

## 2023-11-14 PROCEDURE — 71250 CT THORAX DX C-: CPT | Performed by: RADIOLOGY

## 2023-11-18 DIAGNOSIS — G62.9 NEUROPATHY: ICD-10-CM

## 2023-11-18 RX ORDER — GABAPENTIN 300 MG/1
600 CAPSULE ORAL 2 TIMES DAILY
Qty: 180 CAPSULE | Refills: 1 | Status: SHIPPED | OUTPATIENT
Start: 2023-11-18 | End: 2023-11-20 | Stop reason: SDUPTHER

## 2023-11-20 ENCOUNTER — HOSPITAL ENCOUNTER (OUTPATIENT)
Dept: RESPIRATORY THERAPY | Facility: HOSPITAL | Age: 83
Discharge: HOME | End: 2023-11-20
Payer: MEDICARE

## 2023-11-20 DIAGNOSIS — J96.11 CHRONIC RESPIRATORY FAILURE WITH HYPOXIA (MULTI): ICD-10-CM

## 2023-11-20 DIAGNOSIS — G62.9 NEUROPATHY: ICD-10-CM

## 2023-11-20 PROCEDURE — 94729 DIFFUSING CAPACITY: CPT | Performed by: PEDIATRICS

## 2023-11-20 PROCEDURE — 94760 N-INVAS EAR/PLS OXIMETRY 1: CPT

## 2023-11-20 PROCEDURE — 94060 EVALUATION OF WHEEZING: CPT | Performed by: PEDIATRICS

## 2023-11-20 PROCEDURE — 94726 PLETHYSMOGRAPHY LUNG VOLUMES: CPT | Performed by: PEDIATRICS

## 2023-11-20 PROCEDURE — 98960 EDU&TRN PT SELF-MGMT NQHP 1: CPT | Mod: MUE

## 2023-11-20 PROCEDURE — 94618 PULMONARY STRESS TESTING: CPT | Performed by: PEDIATRICS

## 2023-11-20 RX ORDER — GABAPENTIN 300 MG/1
300 CAPSULE ORAL 2 TIMES DAILY
Qty: 180 CAPSULE | Refills: 1 | Status: SHIPPED | OUTPATIENT
Start: 2023-11-20

## 2023-11-28 LAB
MGC ASCENT PFT - FEV1 - POST: 1.29
MGC ASCENT PFT - FEV1 - PRE: 1.18
MGC ASCENT PFT - FEV1 - PREDICTED: 1.93
MGC ASCENT PFT - FVC - POST: 1.71
MGC ASCENT PFT - FVC - PRE: 1.71
MGC ASCENT PFT - FVC - PREDICTED: 2.54

## 2023-12-05 ENCOUNTER — TELEMEDICINE (OUTPATIENT)
Dept: PULMONOLOGY | Facility: CLINIC | Age: 83
End: 2023-12-05
Payer: MEDICARE

## 2023-12-05 DIAGNOSIS — R91.1 LUNG NODULE: ICD-10-CM

## 2023-12-05 DIAGNOSIS — J84.9 ILD (INTERSTITIAL LUNG DISEASE) (MULTI): Primary | ICD-10-CM

## 2023-12-05 PROCEDURE — 99214 OFFICE O/P EST MOD 30 MIN: CPT | Performed by: INTERNAL MEDICINE

## 2023-12-05 NOTE — PROGRESS NOTES
Chief Complaint     An interactive audio and video telecommunication system which permits real time communications between the patient (at the originating site) and provider (at the distant site) was utilized to provide this telehealth service.   PFT 6 min walk results      History of Present Illness  Mrs. Iverson is a 79 y.o woman, never a smoker being evaluated for cough      PCP: Dr. Odonnell      HPI:  At baseline, she has dyspnea on exertion, but none at rest. Her symptoms started a few months ago.   She currently sits for most of the day, works inside the house, but does not carry loads and do strenuous exercise. She has to stop for breath after walking about 100 meters or a few minutes (mMRC 3). Her CAT score is 22. She also denies orthopnea, pnd, or hernan. Her weight has been mostly stable. She also relates cough, wheezing, and clear, sputum. Has a night cough. No hemoptysis. No fever or shivering chills. She has a runny nose, and a tingling sensation in the back of his throat.. She denies chest pain or heartburn. [place] score (ESS) is 16, because of nighttime coughing. She tried a prednisone taper and a Z-Jasen which did not improve her symptoms. She had a chest CT which could be pulmonary fibrosis she needs a high resolution chest CT. Her PFT did show restriction.     08/18/2020: Since the last visit, patient's breathing is mostly unchanged. No new chest pain, cough, sputum, fever, chills or night sweats. Had repeat PFTs done (results below).      05/11/2021: Since the last visit, patient's breathing has been worsening. CAT 27. In January she had a fall with a broken hip and was hospitalized. She has been having a hard time with the management of her heart failure, often retaining water, needing to increase her water pill based on her weight. She has not been following up with a heart specialist. Over the last week, she has noticed an increased cough with clear sputum. No hemoptysis. No fever chills or night  sweats. No chest pain. She had repeat testing done including spiral DLCO 6-minute walk test and a CT scan of her chest (all results below).     09/21/2021: Since her last visit she feels like her breathing has been progressively worse. She cannot identify a moment where it got acutely worse it has been a slow steady decline. No new fever chills night sweats she continues to cough but does not bring up any phlegm. She had repeat PFTs and 6-minute walk test with the results below.     11/16/2021: Since the last visit, patient's breathing is mostly unchanged. No new chest pain, cough, sputum, fever, chills or night sweats. Patient had repeat CT done (results below).      08/09/2022: Since her last visit she was admitted to [facility] for 2 days in the setting of volume overload. She was aggressively diuresed and lost about 15 to 20 pounds. She was also found to be anemic in the setting of being on dual antiplatelet therapy and anticoagulation. She was thought to have GI bleed as a cause of her anemia. Since she has made a good recovery. Her breathing is now back to baseline. No new cough phlegm sputum fevers or chills. She is being evaluated for hip replacement therapy. She had breathing test done (results below).     11/01/2022: Since the last visit, patient's breathing is mostly unchanged. No new chest pain, cough, sputum, fever, chills or night sweats. Had her hip surgery rescheduled.      06/06/2023: Since the last visit, patient's breathing is mostly unchanged. No new chest pain, cough, sputum, fever, chills or night sweats. Got hip replacement in December 2022. Has recovered well a few weeks after the surgery. She was found to have a ? mass in her bladder and is scheduled for cystoscopy.    12/5/2023 : Since the last visit, patient's breathing is mostly unchanged. No hospital admissions or ED visits. No new chest pain, cough, sputum, fever, chills or night sweats. She got the cystoscopy without major concerns. Feels  some variation in her breathing due to the weather. Has not needed oxygen since her last visit. Patient had repeat breathing test, 6MWT done (results below).      Previous pulmonary history:   She has no history of recurrent infections, or lung disease as a child. She had no previous lung hx, never on oxygen or inhaler therapy.      Inhalers/nebulized medications: Spiriva     Hospitalization History:  She has not been hospitalized over the last year for breathing related problem.     Sleep history:  Denies snoring, apneas, feeling tired during the day or taking naps during the day. Admits to feeling tired during the day.     Comorbidities:  Arthritis  AAA  Aortic stenosis  HTN  CAD  PHTN  Veena     SH:  smoking: never a smoker but had second hand exposure   drinking: none  illicit drug use: none     Occupation: (Full questionnaire on exposures obtained, discussed with the patient and scanned to EMR)  owned a bar  No known exposure to asbestos, silica or beryllium     Family History:  No family history of lung diseases or cancer     Imaging history: (I have personally reviewed the imaging below)  11/2023 CT with stable fibrotic changes and stable lung nodule (largest 8mm at the base of the left lung). Dilated PA at 4cm  10/18/2022 CT mostly unchanged  10/27/2021: CT chest with mostly unchanged scarring at the bases  01/28/2021 - Chest CT with subpleural reticulation and areas of wavy consolidation. Small R sided effusion.   11/21/19 - Chest CT - early subpleural fibrotic changes, small subcentimeter nodules     PFTs:  05/11/2023 -> Ratio of 0.76/FEV1 1.32L (80%) (no BD response)/FVC 1.75L (78%)/TLC 76%/RVtoTLC ratio 0.5/DLCO 48%  10/751825 -> Ratio of 0.79/FEV1 1.35L (80%)(no BD response)/FVC 1.72L (75%)/DLCO 43%  08/02/2022 -> Ratio of 0.81/FEV1 1.36L (80%)(no BD response)/FVC 1.67L (73%)/DLCO 94%  09/08/2021 -> Ratio of 0.77/FEV1 1.38L (80%) (no BD response)/FVC 1.79L (77%)/TLC 83%/RVtoTLC ratio 0.48/DLCO 47%    03/23/2021 -> Ratio of 0.81/FEV1 1.49L (86%) (no BD response)/FVC 1.89L (79%)/DLCO 48%  08/10/2020 -> Ratio of 0.76/FEV1 1.48L (84%)/FVC 1.93L (82%)/TLC 78%/RVtoTLC ratio 0.43/DLCO 42%  2019 // -> FEV1/FVC ratio .80 /FEV1 71% (no BD response)/FVC 66% /DLCO 46%      6 MWTs:   05/11/2023 ->on RA, 74m. Peak SpO2 of 98%. Michael SpO2 98%.   09/08/2021 ->on 2L, 70m. Peak SpO2 of 100%. Michael SpO2 98%.   03/23/2021 ->on RA, 140m. Peak SpO2 of 100%. Michael SpO2 99%.      Lung biopsy: None on record     Echo:  2018-> 40-45% EF, RVSP 50.4, LA severely dilated, RA moderately dilated     Labs:  : Hb 10.6 , Eos <250, Bicarb 26 , Creat 1.52       Review of Systems  Detailed review of system (10 systems) form filled out by the patient. I have reviewed this form with the patient and scanned into the EMR.     There were no vitals filed for this visit.     PHYSICAL EXAMINATION  Constitutional: Alert and oriented. In no acute distress. Well developed, well nourished.  Head and Face: Normal.   Oropharynx: normal.  Neck: No neck mass observed.  Pulmonary: Chest is normal to inspection. No increased work of breathing or signs of respiratory distress.   CV:  No obvious peripheral edema.  MSK: normal gait and station. No clubbing or cyanosis of the fingernails.  Skin: Normal skin color and pigmentation, normal skin turgor, and no rash.  Neurologic:  EOMI. Moving all 4 extremities.  Psychiatric: Intact judgement and insight.     Medication Documentation Review Audit       Reviewed by Khadra Montes De Oca MA (Medical Assistant) on 11/06/23 at 1440      Medication Order Taking? Sig Documenting Provider Last Dose Status   allopurinol (Zyloprim) 100 mg tablet [id number] No Take 1 tablet (100 mg) by mouth once daily. Historical Provider, MD Taking Active   Discontinued 10/31/23 1420   clopidogrel (Plavix) 75 mg tablet [id number]  TAKE ONE TABLET BY MOUTH EVERY DAY Steven Hdz MD  Active   Eliquis 2.5 mg tablet [id number] No TAKE ONE TABLET  BY MOUTH TWO TIMES A DAY Steven Hdz MD Taking Active   ferrous sulfate (FeroSuL) 325 (65 Fe) MG tablet [id number] No Take 1 tablet (325 mg) by mouth 2 times a day. Steven Hdz MD Taking Active   gabapentin (Neurontin) 300 mg capsule [id number] No TAKE ONE CAPSULE BY MOUTH TWO TIMES A DAY   Patient taking differently: Take 1 capsule (300 mg) by mouth once daily at bedtime.    Steven Hdz MD Taking Active   levothyroxine (Synthroid, Levoxyl) 88 mcg tablet [id number] No Take 1 tablet (88 mcg) by mouth once daily in the morning. Take before meals. Felecia Jacobs MD Taking Active   Discontinued 10/31/23 1420   magnesium oxide (Mag-Ox) 400 mg (241.3 mg magnesium) tablet [id number]  TAKE 1 TABLET BY MOUTH ONCE DAILY Steven Hdz MD  Active   metoprolol succinate XL (Toprol-XL) 25 mg 24 hr tablet [id number] No Take 1 tablet (25 mg) by mouth once daily at bedtime. Felecia Jacobs MD Taking Active     Discontinued 11/06/23 1438   pantoprazole (ProtoNix) 40 mg EC tablet [id number] No TAKE ONE TABLET BY MOUTH TWO TIMES A DAY Steven Hdz MD Taking Active   Discontinued 11/06/23 1438   spironolactone (Aldactone) 25 mg tablet [id number] No Take 1 tablet (25 mg) by mouth once daily. Felecia Jacobs MD Taking Active   tiZANidine (Zanaflex) 2 mg tablet [id number]  Take 1 tablet (2 mg) by mouth every 12 hours if needed (back pain). Steven Hdz MD  Active   torsemide (Demadex) 20 mg tablet [id number] No Take 2 tablets (40 mg) by mouth once daily. Take 4 on Thursdays, 2 daily all other days Felecia Jacobs MD Taking Active   valsartan (Diovan) 40 mg tablet [id number] No TAKE ONE TABLET BY MOUTH DAILY Anne Pat MD Taking Active                   Provider Impressions     # Pulmonary fibrosis:  - No known occupational exposure or to medication. Negative serology for CTD  -CT with indeterminate pattern  - PFT shows restriction but  stable  - Does have a history of arthritis  -Stable breathing test, however decrease in 6-minute walk test and increased oxygen requirement. Could be related to ILD progression, however could also be related to her heart failure.  11/2023: CT chest with mostly unchanged scarring at the bases     #Chronic hypoxic respiratory failure:  -We will set up for home oxygen. She will need 2 L of supplemental oxygen with exertion. Improving, have not been wearing her O2     # lung nodules:  -repeat CT in 01/2023. Stable. Will continue to follow on repeat imaging     # Heart failure, S/P aortic valve replacement, pacemaker   - Can contribute to SOB. euvolemic on exam today   - Continue to f/u with cardiology     # Shortness of breath   - Most likely multifactorial   - Stopped Trelegy and start Spiriva    # Dilated PA on CT:  -will get echo to evaluate for PAH      Follow up in 6 months or sooner if needed.

## 2023-12-05 NOTE — PATIENT INSTRUCTIONS
Mrs. Iverson it was a pleasure seeing you in the office today. We discussed the following:     -Your CT scan breathing test is stable  -You will try to accumulate 30 minutes of moderate physical activity almost every day.      We will see her back in clinic in 6 months with repeat breathing, and a walking test

## 2023-12-05 NOTE — ADDENDUM NOTE
Encounter addended by: Maria Alejandra Hicks, RRT on: 12/5/2023 10:40 AM   Actions taken: Charge Capture section accepted

## 2023-12-13 LAB
HOLD SPECIMEN: NORMAL
HOLD SPECIMEN: NORMAL

## 2023-12-22 DIAGNOSIS — I10 BENIGN ESSENTIAL HTN: Primary | ICD-10-CM

## 2024-01-02 DIAGNOSIS — I10 BENIGN ESSENTIAL HTN: ICD-10-CM

## 2024-01-02 RX ORDER — SPIRONOLACTONE 25 MG/1
25 TABLET ORAL DAILY
Qty: 90 TABLET | Refills: 0 | Status: SHIPPED | OUTPATIENT
Start: 2024-01-02 | End: 2024-01-02 | Stop reason: SDUPTHER

## 2024-01-02 RX ORDER — SPIRONOLACTONE 25 MG/1
25 TABLET ORAL DAILY
Qty: 90 TABLET | Refills: 1 | Status: SHIPPED | OUTPATIENT
Start: 2024-01-02

## 2024-01-09 ENCOUNTER — PATIENT OUTREACH (OUTPATIENT)
Dept: PRIMARY CARE | Facility: CLINIC | Age: 84
End: 2024-01-09
Payer: MEDICARE

## 2024-01-23 ENCOUNTER — OFFICE VISIT (OUTPATIENT)
Dept: PRIMARY CARE | Facility: CLINIC | Age: 84
End: 2024-01-23
Payer: MEDICARE

## 2024-01-23 VITALS
WEIGHT: 136 LBS | TEMPERATURE: 97 F | SYSTOLIC BLOOD PRESSURE: 123 MMHG | DIASTOLIC BLOOD PRESSURE: 78 MMHG | OXYGEN SATURATION: 99 % | BODY MASS INDEX: 23.22 KG/M2 | HEART RATE: 75 BPM | HEIGHT: 64 IN

## 2024-01-23 DIAGNOSIS — E03.9 HYPOTHYROIDISM, UNSPECIFIED TYPE: ICD-10-CM

## 2024-01-23 DIAGNOSIS — Z00.00 MEDICARE ANNUAL WELLNESS VISIT, SUBSEQUENT: Primary | ICD-10-CM

## 2024-01-23 DIAGNOSIS — M25.512 CHRONIC LEFT SHOULDER PAIN: ICD-10-CM

## 2024-01-23 DIAGNOSIS — G89.29 CHRONIC LEFT SHOULDER PAIN: ICD-10-CM

## 2024-01-23 DIAGNOSIS — N18.32 STAGE 3B CHRONIC KIDNEY DISEASE (MULTI): ICD-10-CM

## 2024-01-23 DIAGNOSIS — I48.19 PERSISTENT ATRIAL FIBRILLATION (MULTI): ICD-10-CM

## 2024-01-23 DIAGNOSIS — I50.20 SYSTOLIC HEART FAILURE, UNSPECIFIED HF CHRONICITY (MULTI): ICD-10-CM

## 2024-01-23 DIAGNOSIS — J84.9 ILD (INTERSTITIAL LUNG DISEASE) (MULTI): ICD-10-CM

## 2024-01-23 PROBLEM — D69.6 THROMBOCYTOPENIA (CMS-HCC): Status: RESOLVED | Noted: 2023-02-01 | Resolved: 2024-01-23

## 2024-01-23 PROBLEM — J96.11 CHRONIC RESPIRATORY FAILURE WITH HYPOXIA (MULTI): Status: RESOLVED | Noted: 2023-02-01 | Resolved: 2024-01-23

## 2024-01-23 PROCEDURE — 1036F TOBACCO NON-USER: CPT | Performed by: FAMILY MEDICINE

## 2024-01-23 PROCEDURE — 1158F ADVNC CARE PLAN TLK DOCD: CPT | Performed by: FAMILY MEDICINE

## 2024-01-23 PROCEDURE — 1123F ACP DISCUSS/DSCN MKR DOCD: CPT | Performed by: FAMILY MEDICINE

## 2024-01-23 PROCEDURE — 3078F DIAST BP <80 MM HG: CPT | Performed by: FAMILY MEDICINE

## 2024-01-23 PROCEDURE — 1170F FXNL STATUS ASSESSED: CPT | Performed by: FAMILY MEDICINE

## 2024-01-23 PROCEDURE — 99397 PER PM REEVAL EST PAT 65+ YR: CPT | Performed by: FAMILY MEDICINE

## 2024-01-23 PROCEDURE — 1157F ADVNC CARE PLAN IN RCRD: CPT | Performed by: FAMILY MEDICINE

## 2024-01-23 PROCEDURE — G0439 PPPS, SUBSEQ VISIT: HCPCS | Performed by: FAMILY MEDICINE

## 2024-01-23 PROCEDURE — 1126F AMNT PAIN NOTED NONE PRSNT: CPT | Performed by: FAMILY MEDICINE

## 2024-01-23 PROCEDURE — 1160F RVW MEDS BY RX/DR IN RCRD: CPT | Performed by: FAMILY MEDICINE

## 2024-01-23 PROCEDURE — 3074F SYST BP LT 130 MM HG: CPT | Performed by: FAMILY MEDICINE

## 2024-01-23 PROCEDURE — 1159F MED LIST DOCD IN RCRD: CPT | Performed by: FAMILY MEDICINE

## 2024-01-23 ASSESSMENT — ACTIVITIES OF DAILY LIVING (ADL)
MANAGING_FINANCES: INDEPENDENT
BATHING: INDEPENDENT
GROCERY_SHOPPING: INDEPENDENT
TAKING_MEDICATION: INDEPENDENT
DRESSING: INDEPENDENT
DOING_HOUSEWORK: INDEPENDENT

## 2024-01-23 ASSESSMENT — ENCOUNTER SYMPTOMS
DEPRESSION: 0
OCCASIONAL FEELINGS OF UNSTEADINESS: 0
LOSS OF SENSATION IN FEET: 0

## 2024-01-23 NOTE — PROGRESS NOTES
"Subjective   Reason for Visit: Shaina Iverson is an 83 y.o. female here for a Medicare Wellness visit.     Past Medical, Surgical, and Family History reviewed and updated in chart.    Reviewed all medications by prescribing practitioner or clinical pharmacist (such as prescriptions, OTCs, herbal therapies and supplements) and documented in the medical record.    HPI  Here for medicare annual visit  Patient has been having left shoulder pain x 3 months, get worse, denies any trauma. Difficulty lifting her arm, does have orthopedic appt in a few months. Has not tried anything for it.   Has CHF and A-fib, following with cardiology   Following with renal for CKD  Has Interstitial lung disease, following with pulmonology    Patient Care Team:  Steven Hdz MD as PCP - General  Steven Hdz MD as PCP - [employer]age PCP  Steven Hdz MD as PCP - [employer]vantage PCP     Review of Systems  General: no fever  Eyes: no blurry vision  ENT: no sore throat, no ear pain  Resp: no cough, sob or wheezing  Cardio: no chest pain, no palpitations  Abd: no nausea/vomiting  : no dysuria, no increased urinary frequency    Objective   Vitals:  /78   Pulse 75   Temp 36.1 °C (97 °F)   Ht 1.626 m (5' 4\")   Wt 61.7 kg (136 lb)   SpO2 99%   BMI 23.34 kg/m²       Physical Exam  Gen: NAD, alert  Head: normocephalic/atraumatic  Eyes: conjunctivae normal  Ears: canals clear bilaterally, TM normal   Nose: external nose normal   Oropharynx: clear   Resp: Clear to auscultation  CVS: Regular rate and rhythm  Abdomen: soft, NT, ND  Ext: no edema, NT of lower extremities, limited ROM of left shoulder  Neuro: using walker to ambulate    Assessment/Plan   Problem List Items Addressed This Visit       Atrial fibrillation (CMS/HCC)  Continue follow up with cardiology    Hypothyroid    Relevant Orders    TSH with reflex to Free T4 if abnormal    Lipid Panel    ILD (interstitial lung " disease) (CMS/HCC)  Continue follow up with pulmonology    Stage 3b chronic kidney disease (CMS/HCC)  Continue follow up with renal     Heart failure (CMS/HCC)    Relevant Orders    Disability Placard     Other Visit Diagnoses       Medicare annual wellness visit, subsequent    -  Primary    BMI 23.0-23.9, adult        Chronic left shoulder pain        Relevant Orders    XR shoulder left 2+ views    Referral to Physical Therapy

## 2024-02-05 DIAGNOSIS — D50.0 IRON DEFICIENCY ANEMIA DUE TO CHRONIC BLOOD LOSS: ICD-10-CM

## 2024-02-06 RX ORDER — FERROUS SULFATE TAB 325 MG (65 MG ELEMENTAL FE) 325 (65 FE) MG
1 TAB ORAL 2 TIMES DAILY
Qty: 180 TABLET | Refills: 1 | Status: SHIPPED | OUTPATIENT
Start: 2024-02-06

## 2024-02-23 DIAGNOSIS — M47.816 FACET DEGENERATION OF LUMBAR REGION: ICD-10-CM

## 2024-02-23 RX ORDER — TIZANIDINE 2 MG/1
2 TABLET ORAL EVERY 12 HOURS PRN
Qty: 60 TABLET | Refills: 3 | Status: SHIPPED | OUTPATIENT
Start: 2024-02-23

## 2024-03-13 DIAGNOSIS — I48.91 ATRIAL FIBRILLATION, UNSPECIFIED TYPE (MULTI): ICD-10-CM

## 2024-03-14 RX ORDER — APIXABAN 2.5 MG/1
TABLET, FILM COATED ORAL
Qty: 60 TABLET | Refills: 6 | Status: SHIPPED | OUTPATIENT
Start: 2024-03-14

## 2024-04-08 DIAGNOSIS — I10 ESSENTIAL HYPERTENSION: ICD-10-CM

## 2024-04-09 DIAGNOSIS — E03.9 HYPOTHYROIDISM, UNSPECIFIED TYPE: ICD-10-CM

## 2024-04-09 RX ORDER — LEVOTHYROXINE SODIUM 88 UG/1
TABLET ORAL
Qty: 90 TABLET | Refills: 2 | Status: SHIPPED | OUTPATIENT
Start: 2024-04-09

## 2024-04-09 RX ORDER — TORSEMIDE 20 MG/1
40 TABLET ORAL DAILY
Qty: 192 TABLET | Refills: 1 | Status: SHIPPED | OUTPATIENT
Start: 2024-04-09

## 2024-04-19 DIAGNOSIS — D50.9 IRON DEFICIENCY ANEMIA, UNSPECIFIED IRON DEFICIENCY ANEMIA TYPE: ICD-10-CM

## 2024-04-19 DIAGNOSIS — K20.90 ESOPHAGITIS: ICD-10-CM

## 2024-04-20 RX ORDER — PANTOPRAZOLE SODIUM 40 MG/1
TABLET, DELAYED RELEASE ORAL
Qty: 180 TABLET | Refills: 3 | Status: SHIPPED | OUTPATIENT
Start: 2024-04-20

## 2024-04-20 RX ORDER — LANOLIN ALCOHOL/MO/W.PET/CERES
1 CREAM (GRAM) TOPICAL DAILY
Qty: 90 TABLET | Refills: 3 | Status: SHIPPED | OUTPATIENT
Start: 2024-04-20

## 2024-05-05 DIAGNOSIS — E79.0 HYPERURICEMIA: Primary | ICD-10-CM

## 2024-05-05 DIAGNOSIS — I48.19 PERSISTENT ATRIAL FIBRILLATION (MULTI): ICD-10-CM

## 2024-05-06 RX ORDER — ALLOPURINOL 100 MG/1
100 TABLET ORAL DAILY
Qty: 30 TABLET | Refills: 0 | Status: SHIPPED | OUTPATIENT
Start: 2024-05-06 | End: 2024-05-13

## 2024-05-07 RX ORDER — CLOPIDOGREL BISULFATE 75 MG/1
75 TABLET ORAL DAILY
Qty: 30 TABLET | Refills: 3 | Status: SHIPPED | OUTPATIENT
Start: 2024-05-07

## 2024-05-12 DIAGNOSIS — E79.0 HYPERURICEMIA: ICD-10-CM

## 2024-05-13 ENCOUNTER — APPOINTMENT (OUTPATIENT)
Dept: NEPHROLOGY | Facility: CLINIC | Age: 84
End: 2024-05-13
Payer: MEDICARE

## 2024-05-13 RX ORDER — ALLOPURINOL 100 MG/1
100 TABLET ORAL DAILY
Qty: 30 TABLET | Refills: 0 | Status: SHIPPED | OUTPATIENT
Start: 2024-05-13

## 2024-05-30 ENCOUNTER — OFFICE VISIT (OUTPATIENT)
Dept: PRIMARY CARE | Facility: CLINIC | Age: 84
End: 2024-05-30
Payer: MEDICARE

## 2024-05-30 VITALS
BODY MASS INDEX: 23.9 KG/M2 | DIASTOLIC BLOOD PRESSURE: 74 MMHG | HEIGHT: 64 IN | TEMPERATURE: 97 F | OXYGEN SATURATION: 96 % | HEART RATE: 71 BPM | WEIGHT: 140 LBS | SYSTOLIC BLOOD PRESSURE: 139 MMHG

## 2024-05-30 DIAGNOSIS — R21 RASH AND OTHER NONSPECIFIC SKIN ERUPTION: Primary | ICD-10-CM

## 2024-05-30 PROCEDURE — 3075F SYST BP GE 130 - 139MM HG: CPT | Performed by: FAMILY MEDICINE

## 2024-05-30 PROCEDURE — 1160F RVW MEDS BY RX/DR IN RCRD: CPT | Performed by: FAMILY MEDICINE

## 2024-05-30 PROCEDURE — 3078F DIAST BP <80 MM HG: CPT | Performed by: FAMILY MEDICINE

## 2024-05-30 PROCEDURE — 1157F ADVNC CARE PLAN IN RCRD: CPT | Performed by: FAMILY MEDICINE

## 2024-05-30 PROCEDURE — 1159F MED LIST DOCD IN RCRD: CPT | Performed by: FAMILY MEDICINE

## 2024-05-30 PROCEDURE — 99213 OFFICE O/P EST LOW 20 MIN: CPT | Performed by: FAMILY MEDICINE

## 2024-05-30 PROCEDURE — 1036F TOBACCO NON-USER: CPT | Performed by: FAMILY MEDICINE

## 2024-05-30 RX ORDER — TRIAMCINOLONE ACETONIDE 1 MG/G
CREAM TOPICAL
Qty: 453.6 G | Refills: 0 | Status: SHIPPED | OUTPATIENT
Start: 2024-05-30

## 2024-05-30 RX ORDER — PREDNISONE 20 MG/1
TABLET ORAL
Qty: 18 TABLET | Refills: 0 | Status: SHIPPED | OUTPATIENT
Start: 2024-05-30

## 2024-05-30 ASSESSMENT — PATIENT HEALTH QUESTIONNAIRE - PHQ9
2. FEELING DOWN, DEPRESSED OR HOPELESS: NOT AT ALL
SUM OF ALL RESPONSES TO PHQ9 QUESTIONS 1 AND 2: 0
1. LITTLE INTEREST OR PLEASURE IN DOING THINGS: NOT AT ALL

## 2024-05-30 ASSESSMENT — ENCOUNTER SYMPTOMS
DEPRESSION: 0
OCCASIONAL FEELINGS OF UNSTEADINESS: 0
LOSS OF SENSATION IN FEET: 0

## 2024-05-30 NOTE — PROGRESS NOTES
"Subjective   Patient ID: Shaina Iverson is a 83 y.o. female who presents for Rash (Arms legs and back for about 10 days at times it itches nothing is helping).    HPI  Here with rash on arms, legs and back for the past 10 days. No new soaps, detergents, lotions, meds, foods. Pruritic.     Review of Systems  General: no fever  Eyes: no blurry vision  ENT: no sore throat, no ear pain  Resp: no cough, sob or wheezing  Cardio: no chest pain, no palpitations  Abd: no nausea/vomiting  : no dysuria, no increased urinary frequency      /74   Pulse 71   Temp 36.1 °C (97 °F)   Ht 1.626 m (5' 4\")   Wt 63.5 kg (140 lb)   SpO2 96%   BMI 24.03 kg/m²       Objective   Physical Exam  Gen: NAD, alert  Head: normocephalic/atraumatic  Eyes: conjunctivae normal  Ears: canals clear bilaterally, TM normal   Nose: external nose normal   Oropharynx: clear   Skin: patches of scaly hyperpigmented rash on arms, back and lower extremities  Resp: Clear to auscultation  CVS: Regular rate and rhythm  Abdomen: soft, NT, ND  Ext: no edema, NT of lower extremities       Assessment/Plan   Problem List Items Addressed This Visit    None  Visit Diagnoses       Rash and other nonspecific skin eruption    -  Primary    Relevant Medications    triamcinolone (Kenalog) 0.1 % cream    predniSONE (Deltasone) 20 mg tablet               "

## 2024-05-30 NOTE — PATIENT INSTRUCTIONS
Please continue taking benadryl at night as needed for itching  Please stop the hydrocortisone, please start the triamcinolone  Please take the prednisone with food as prescribed

## 2024-06-02 DIAGNOSIS — I10 BENIGN ESSENTIAL HTN: Primary | ICD-10-CM

## 2024-06-03 RX ORDER — METOPROLOL SUCCINATE 25 MG/1
25 TABLET, EXTENDED RELEASE ORAL NIGHTLY
Qty: 90 TABLET | Refills: 0 | Status: SHIPPED | OUTPATIENT
Start: 2024-06-03

## 2024-06-11 ENCOUNTER — APPOINTMENT (OUTPATIENT)
Dept: PULMONOLOGY | Facility: CLINIC | Age: 84
End: 2024-06-11
Payer: MEDICARE

## 2024-06-17 DIAGNOSIS — G62.9 NEUROPATHY: ICD-10-CM

## 2024-06-17 DIAGNOSIS — I10 BENIGN ESSENTIAL HTN: ICD-10-CM

## 2024-06-17 DIAGNOSIS — D50.0 IRON DEFICIENCY ANEMIA DUE TO CHRONIC BLOOD LOSS: ICD-10-CM

## 2024-06-17 DIAGNOSIS — E79.0 HYPERURICEMIA: ICD-10-CM

## 2024-06-17 RX ORDER — METOPROLOL SUCCINATE 25 MG/1
25 TABLET, EXTENDED RELEASE ORAL NIGHTLY
Qty: 90 TABLET | Refills: 1 | Status: SHIPPED | OUTPATIENT
Start: 2024-06-17

## 2024-06-17 RX ORDER — SPIRONOLACTONE 25 MG/1
25 TABLET ORAL DAILY
Qty: 90 TABLET | Refills: 1 | Status: SHIPPED | OUTPATIENT
Start: 2024-06-17

## 2024-06-17 RX ORDER — GABAPENTIN 300 MG/1
300 CAPSULE ORAL 2 TIMES DAILY
Qty: 180 CAPSULE | Refills: 1 | Status: SHIPPED | OUTPATIENT
Start: 2024-06-17

## 2024-06-17 RX ORDER — FERROUS SULFATE TAB 325 MG (65 MG ELEMENTAL FE) 325 (65 FE) MG
1 TAB ORAL 2 TIMES DAILY
Qty: 180 TABLET | Refills: 1 | Status: SHIPPED | OUTPATIENT
Start: 2024-06-17

## 2024-06-18 RX ORDER — ALLOPURINOL 100 MG/1
100 TABLET ORAL DAILY
Qty: 30 TABLET | Refills: 0 | Status: SHIPPED | OUTPATIENT
Start: 2024-06-18

## 2024-07-23 ENCOUNTER — APPOINTMENT (OUTPATIENT)
Dept: PRIMARY CARE | Facility: CLINIC | Age: 84
End: 2024-07-23
Payer: MEDICARE

## 2024-08-04 DIAGNOSIS — E79.0 HYPERURICEMIA: ICD-10-CM

## 2024-08-05 RX ORDER — ALLOPURINOL 100 MG/1
100 TABLET ORAL DAILY
Qty: 30 TABLET | Refills: 0 | Status: SHIPPED | OUTPATIENT
Start: 2024-08-05

## 2024-08-13 ENCOUNTER — TELEPHONE (OUTPATIENT)
Dept: PULMONOLOGY | Facility: HOSPITAL | Age: 84
End: 2024-08-13
Payer: MEDICARE

## 2024-08-13 DIAGNOSIS — J84.9 ILD (INTERSTITIAL LUNG DISEASE) (MULTI): ICD-10-CM

## 2024-09-09 DIAGNOSIS — I48.19 PERSISTENT ATRIAL FIBRILLATION (MULTI): ICD-10-CM

## 2024-09-09 DIAGNOSIS — E79.0 HYPERURICEMIA: ICD-10-CM

## 2024-09-10 RX ORDER — CLOPIDOGREL BISULFATE 75 MG/1
75 TABLET ORAL DAILY
Qty: 90 TABLET | Refills: 1 | Status: SHIPPED | OUTPATIENT
Start: 2024-09-10

## 2024-09-10 RX ORDER — ALLOPURINOL 100 MG/1
100 TABLET ORAL DAILY
Qty: 30 TABLET | Refills: 0 | Status: SHIPPED | OUTPATIENT
Start: 2024-09-10

## 2024-09-16 ENCOUNTER — APPOINTMENT (OUTPATIENT)
Dept: NEPHROLOGY | Facility: CLINIC | Age: 84
End: 2024-09-16
Payer: MEDICARE

## 2024-09-16 ENCOUNTER — LAB (OUTPATIENT)
Dept: LAB | Facility: LAB | Age: 84
End: 2024-09-16
Payer: MEDICARE

## 2024-09-16 VITALS
SYSTOLIC BLOOD PRESSURE: 134 MMHG | RESPIRATION RATE: 16 BRPM | TEMPERATURE: 97.5 F | DIASTOLIC BLOOD PRESSURE: 63 MMHG | BODY MASS INDEX: 23.56 KG/M2 | OXYGEN SATURATION: 97 % | WEIGHT: 141.4 LBS | HEART RATE: 70 BPM | HEIGHT: 65 IN

## 2024-09-16 DIAGNOSIS — I10 BENIGN ESSENTIAL HTN: ICD-10-CM

## 2024-09-16 DIAGNOSIS — E03.9 HYPOTHYROIDISM, UNSPECIFIED TYPE: ICD-10-CM

## 2024-09-16 DIAGNOSIS — N18.32 STAGE 3B CHRONIC KIDNEY DISEASE (MULTI): Primary | ICD-10-CM

## 2024-09-16 DIAGNOSIS — I50.20 SYSTOLIC HEART FAILURE, UNSPECIFIED HF CHRONICITY: ICD-10-CM

## 2024-09-16 DIAGNOSIS — N18.32 STAGE 3B CHRONIC KIDNEY DISEASE (MULTI): ICD-10-CM

## 2024-09-16 LAB
ALBUMIN SERPL BCP-MCNC: 4.3 G/DL (ref 3.4–5)
ANION GAP SERPL CALC-SCNC: 15 MMOL/L (ref 10–20)
BUN SERPL-MCNC: 33 MG/DL (ref 6–23)
CALCIUM SERPL-MCNC: 9.3 MG/DL (ref 8.6–10.3)
CHLORIDE SERPL-SCNC: 100 MMOL/L (ref 98–107)
CHOLEST SERPL-MCNC: 179 MG/DL (ref 0–199)
CHOLESTEROL/HDL RATIO: 5.1
CO2 SERPL-SCNC: 29 MMOL/L (ref 21–32)
CREAT SERPL-MCNC: 1.85 MG/DL (ref 0.5–1.05)
EGFRCR SERPLBLD CKD-EPI 2021: 27 ML/MIN/1.73M*2
ERYTHROCYTE [DISTWIDTH] IN BLOOD BY AUTOMATED COUNT: 13.9 % (ref 11.5–14.5)
FERRITIN SERPL-MCNC: 121 NG/ML (ref 8–150)
GLUCOSE SERPL-MCNC: 78 MG/DL (ref 74–99)
HCT VFR BLD AUTO: 42.8 % (ref 36–46)
HDLC SERPL-MCNC: 35.4 MG/DL
HGB BLD-MCNC: 13.8 G/DL (ref 12–16)
IRON SATN MFR SERPL: 15 % (ref 25–45)
IRON SERPL-MCNC: 51 UG/DL (ref 35–150)
LDLC SERPL CALC-MCNC: 95 MG/DL
MAGNESIUM SERPL-MCNC: 2.34 MG/DL (ref 1.6–2.4)
MCH RBC QN AUTO: 31.1 PG (ref 26–34)
MCHC RBC AUTO-ENTMCNC: 32.2 G/DL (ref 32–36)
MCV RBC AUTO: 96 FL (ref 80–100)
NON HDL CHOLESTEROL: 144 MG/DL (ref 0–149)
NRBC BLD-RTO: 0 /100 WBCS (ref 0–0)
PHOSPHATE SERPL-MCNC: 3.7 MG/DL (ref 2.5–4.9)
PLATELET # BLD AUTO: 163 X10*3/UL (ref 150–450)
POTASSIUM SERPL-SCNC: 4.5 MMOL/L (ref 3.5–5.3)
RBC # BLD AUTO: 4.44 X10*6/UL (ref 4–5.2)
SODIUM SERPL-SCNC: 139 MMOL/L (ref 136–145)
TIBC SERPL-MCNC: 338 UG/DL (ref 240–445)
TRIGL SERPL-MCNC: 244 MG/DL (ref 0–149)
TSH SERPL-ACNC: 2.01 MIU/L (ref 0.44–3.98)
UIBC SERPL-MCNC: 287 UG/DL (ref 110–370)
URATE SERPL-MCNC: 8.1 MG/DL (ref 2.3–6.7)
VLDL: 49 MG/DL (ref 0–40)
WBC # BLD AUTO: 6.3 X10*3/UL (ref 4.4–11.3)

## 2024-09-16 PROCEDURE — 84443 ASSAY THYROID STIM HORMONE: CPT

## 2024-09-16 PROCEDURE — 3078F DIAST BP <80 MM HG: CPT | Performed by: INTERNAL MEDICINE

## 2024-09-16 PROCEDURE — 82728 ASSAY OF FERRITIN: CPT

## 2024-09-16 PROCEDURE — 1157F ADVNC CARE PLAN IN RCRD: CPT | Performed by: INTERNAL MEDICINE

## 2024-09-16 PROCEDURE — 3075F SYST BP GE 130 - 139MM HG: CPT | Performed by: INTERNAL MEDICINE

## 2024-09-16 PROCEDURE — 99215 OFFICE O/P EST HI 40 MIN: CPT | Performed by: INTERNAL MEDICINE

## 2024-09-16 PROCEDURE — 82306 VITAMIN D 25 HYDROXY: CPT

## 2024-09-16 PROCEDURE — 83550 IRON BINDING TEST: CPT

## 2024-09-16 PROCEDURE — 1159F MED LIST DOCD IN RCRD: CPT | Performed by: INTERNAL MEDICINE

## 2024-09-16 PROCEDURE — 83540 ASSAY OF IRON: CPT

## 2024-09-16 PROCEDURE — 84550 ASSAY OF BLOOD/URIC ACID: CPT

## 2024-09-16 PROCEDURE — 85027 COMPLETE CBC AUTOMATED: CPT

## 2024-09-16 PROCEDURE — 83970 ASSAY OF PARATHORMONE: CPT

## 2024-09-16 PROCEDURE — 36415 COLL VENOUS BLD VENIPUNCTURE: CPT

## 2024-09-16 PROCEDURE — 80069 RENAL FUNCTION PANEL: CPT

## 2024-09-16 PROCEDURE — 83735 ASSAY OF MAGNESIUM: CPT

## 2024-09-16 PROCEDURE — 80061 LIPID PANEL: CPT

## 2024-09-16 NOTE — PATIENT INSTRUCTIONS
Dear BAO   It was nice seeing you in the nephrology clinic today     Today we discussed the following:     #Chronic kidney disease stage III your baseline kidney function 40%-relatively stable - Due to repeat blood work today.    #Blood pressure is accepted  today     #Recent worsening of kidney function in the last few months kidney function is down to 18%. Now your kidney function is back up again to 40% which is your baseline. Continue all same. You are due to repeat blood work today     -Continue to hold Farxiga  -Continue decreased dose valsartan 40 mg  -Continue decreased dose of spironolactone 25 mg once daily  -Continue torsemide 20 mg 2 times daily except Thursdays take 4 times daily        #Uric acid is elevated-continue allopurinol 100 mg. We will check uric acid with follow-up     #Your iron is low-continue to take iron pills with vitamin C (lemonade or orange juice) to increase absorption     Follow-up on blood work results-recommendation to follow   Follow-up in 6 month with repeat blood work and urinalysis prior to next visit     Please call our office if you have any question  Thank you for coming to see me today     Anne Pat MD, MS, BENNIE YUNG  Clinical  - [facility] [employer]  Nephrologist - [facility] - [facility]

## 2024-09-16 NOTE — PROGRESS NOTES
Subjective       Ms Iverson is a 83-year-old female with past medical history of CKD stage III, anemia, A. fib, congestive heart failure, hypertension, coronary artery disease, GI bleed on PPI, hypomagnesia and hypokalemia, asthma, recurrent UTI, aortic valve stenosis status post replacement who is coming to see me today for CKD follow-up.    Last office visit November 2023.  Shaina came today with her son/Javi.  No kidney recent complaints or concerns.  She did not get blood work done prior to the visit.  Blood pressures accepted.  Good adherence to medications.  Will get blood work today and follow-up with results    Per prior notes prior notes    Last visit April 2023. Today pt is here with her son Javi. She was in the hospital 10/2023 for back pain. She was found to be c diff positive, she states she did not take PO abx for this. Saw her PCP after. Has intermittent diarrhea. Reviewed CT imaging which showed normal appearing kidneys.   Her BP is good this visit. Denies checking it at home, though has machine. Continues to not take Farxiga. Takes torsemide 2 pills daily and 4 pills on Thursdays. Denies dysuria,  symptoms.       Per prior note     Last office visit December 2022. At that time we started allopurinol 100 mg po qd for elevated uric acid. Since then, she reports doing well. She recently underwent left total hip arthroplasty and otherwise has no complaints or concerns. Today, she is accompanied by her son. /68 today. She has not had interval blood work, but will obtain today. We will call and update her with results and any changes to management. She has continued off of Farxiga 2/2 past DAVID and hypotension. She continues on iron supplementation for iron deficiency without anemia. She continues on magnesium supplements. She has not had significant leg swelling, shortness of breath or weight gain. She watches the salt in her diet.      Per prior notes     Last office visit November 2022. She  came initially to see me for worsening kidney function. We had some concerns about hypertension over medications. We adjusted her blood pressure. In the interim, she feels well. She came today with her son. No complaints or concerns. Blood pressure is excellent. She had recent blood work done and all results were discussed with them in details including improved serum creatinine 1.3 and GFR back to baseline 40. With normal electrolytes. She has no anemia. Blood work showed iron deficiency. She continues to be on iron supplement. She continues to be on magnesium supplements. She still has significant hip pain with plans for possible hip surgery. She is a wheelchair-bound at this time. No leg swelling or shortness of breath. No significant weight gain since last office visit. She watches salt in diet. No history of gout     Per prior notes     Shaina came today with her son. She reports frequent UTIs. She had frequent rounds of antibiotic recently. She is not aware of history of chronic kidney disease. She does not check blood pressure at home. She denies recent history of NSAID use however she had remote history of ibuprofen intake. She suffers from chronic hip pain and she was hoping for hip surgery clearance this coming week. She has A. fib and she is undergoing pacemaker replacement later this week. She reports good urine output. No lower urine tract symptoms. No leg swelling or shortness of breath. She does not follow specific diet. She never had kidney stones in the past. She suffered from GI bleed for which she is on chronic PPI. She denies blood or foam in the urine.     Family history: Mom had multiple kidney issues-unknown exact disease. No one in the family is on dialysis  Social history: Non-smoker, no drugs  Surgical history: Irrelevant      Review of Systems     Constitutional: no fever,~no chills,~no recent weight gain~and~no recent weight loss.   Eyes: eyesight problems, but~no blurred vision~and~no  "diplopia.   ENT: no nasal discharge, ~no hearing loss,~no earache,~no sore throat~and~no swollen glands in the neck.   Cardiovascular: no chest pain,~no palpitations~and~no lower extremity edema.   Respiratory: no shortness of breath,~no chronic cough~and~no shortness of breath during exertion.   Gastrointestinal: diarrhea, no abdominal pain,~no constipation,~no heartburn,~no vomiting,~no bloody stools  Genitourinary: no dysuria~and~no hematuria.   Musculoskeletal: no arthralgias~and~difficulty walking, but~no myalgias.   Skin: no rashes~and~no skin lesions.   Neurological: no headaches~and~no dizziness.   Psychiatric: no confusion,~no depression~and~no anxiety.   Endocrine: no heat intolerance,~no cold intolerance,~appetite not increased,~no thyroid disorder,~no increased urinary frequency~and~no dry skin.   Hematologic/Lymphatic: does not bleed easily~and~does not bruise easily.   All other systems have been reviewed and are negative for complaint.        Objective   /63 (BP Location: Left arm, Patient Position: Standing, BP Cuff Size: Adult)   Pulse 70   Temp 36.4 °C (97.5 °F)   Resp 16   Ht 1.651 m (5' 5\")   Wt 64.1 kg (141 lb 6.4 oz)   SpO2 97%   BMI 23.53 kg/m²   Wt Readings from Last 3 Encounters:   09/16/24 64.1 kg (141 lb 6.4 oz)   05/30/24 63.5 kg (140 lb)   01/23/24 61.7 kg (136 lb)       Physical Exam    General appearance: no distress awake and alert on room air, euvolemic on exam  Eyes: non-icteric  HEENT: atrumatic head, PEERLA, moist mucosa  Skin: no apparent rash  Heart: NSR, S1, S2 normal, no murmur or gallop  Lungs: Symmetrical expansion,CTA bilat no wheezing/crackles  Abdomen: soft, nt/nd, obese  Extremities: no edema bilat  Neuro: No FND,asterixis, no focal deficits noticed        Data Review                     No lab exists for component: \"LABALBU\"    Lab Results   Component Value Date    URICACID 7.5 (H) 10/04/2023       No components found for: \"[id number]\"      Lab Results "   Component Value Date    HGBA1C 5.0 11/03/2022         Lab Results   Component Value Date    CALCIUM 8.9 10/11/2023    PHOS 3.5 10/04/2023         No results found for requested labs within last 365 days.    Recent Labs     10/11/23  0435 10/10/23  0454 10/09/23  0517 10/08/23  2009 10/04/23  1546 07/14/23  1823 07/07/23  1913 06/14/23  1519 04/06/23  1510 01/24/23  0120 01/23/23  0452 01/22/23  0449 01/21/23  0411 01/20/23  0512   * 134* 133* 135* 135* 136 137   < > 137 138 139 138 134 124*   K 4.1 4.2 3.9 4.2 4.0 4.0 4.3   < > 4.6 3.9 3.7 3.9 4.1 4.3   CO2 23 23 26 27 25 27 24   < > 26 27 24 23* 25 25   BUN 42* 40* 37* 42* 35* 24* 28*   < > 34* 27* 29* 39* 50* 53*   GLUCOSE 94 124* 96 96 88 88 91   < > 72* 93 123* 94 75 100*   CALCIUM 8.9 9.0 8.8 9.6 8.9 9.3 8.9   < > 9.7 9.1 9.3 9.6 8.8 7.9*   PHOS  --   --   --   --  3.5  --   --   --  3.6 1.7* 1.7* 2.0* 3.3 3.9   CREATININE 1.40* 1.60* 1.66* 1.95* 1.61* 1.33* 1.37*   < > 1.26* 1.0 1.0 1.2 1.8* 2.6*   EGFR 38* 32* 31* 26* 32*  --   --   --   --  57 57 46 28 18   ANIONGAP 13 16 10 15 14 13 13   < > 16 11 13 16 11 10    < > = values in this interval not displayed.         Assessment and Plan     Ms Iverson is a 82-year-old female with past medical history of CKD stage III, anemia, A. fib, congestive heart failure, hypertension, coronary artery disease, GI bleed on PPI, hypomagnesia and hypokalemia, asthma, recurrent UTI, aortic valve stenosis status post replacement who is coming to see me today for CKD follow-up.     Impression  #Chronic kidney disease stage III  Baseline serum creatinine 1.1-1.3, baseline GFR 40-50 mill per minute per 1.73 mÂ² up until October 2022. Possibly atherosclerotic cardiovascular disease and cardiorenal syndrome type II. Kidney ultrasound done in November 2022 was reviewed and showed shrunk kidney bilateral consistent with history of chronic kidney disease--albeit, right kidney 7.6 cm, left kidney 8.4 cm with cyst 1.7 cm x 1.7  cm. No obstruction. In October 2023, serum creatinine is 1.4 and GFR 38 mils per minute per 1.73 mÂ² and has been stable  -Due for repeat blood work     #Worsened renal function prior to Dec 2022 visit, improved   - Per prior note: Started October 2022 serum creatinine has been worsening last 2.5 and GFR 18 mill per minute per 1.73 mÂ² in November 2022. Differential diagnoses included acute interstitial nephritis in the setting of multiple antibiotic rounds. Urine earlier showed large leukocyte esterase, +1 protein, moderate blood. Prior urine culture recently is negative. She is asymptomatic with no pain or burning with urination. She has been on PPI for a long time for melena - I am hesitant to hold at this time. Since Dec 2022, kidney function improved to baseline. I will followed conservative measures and adjusted blood pressure medication.   - improved kidney function 10/2023. Repeat labs next visit      #Hypertension - well-controlled  - blood pressure is excellent.   - Currently on valsartan 40 mg qd, spironolactone 25 mg every day, torsemide daily (20 mg x2, thursdays 20 mg x4)   - Continue holding Farxiga. In the spite of evidence of benefits from SGL 2 inhibitor intake, recurrent UTI might be limiting use of SGLT2 inhibitors.   - no changes      #Hyperuricemia   - no history of gout  - currently on allopurinol 100 mg qd   - Continue low animal-based protein diet  - uric acid elevated at 7.5 10/2023. Recheck next visit      #CHF/A. fib-currently on diuretics/RAAS inhibitor-off SGL 2 inhibitor. Not in exacerbation at this time.   -torsemide daily (20 mg x2, thursdays 20 mg x4). Continue same     #chronic iron deficiency anemia - hemoglobin 10.6 10/2023. Iron deficiency-counseled regarding iron tablets, taking with citrus, and using stool softeners as needed to limit constipation.     #Hypomagnesemia, hypokalemia  - magnesium low 1/2023 at 1.5. potassium WNL   - recheck labs     #CKD/MBD  - repeat calcium,  phosphorus, PTH and vitamin D labs     Follow-up in 6 months with repeat blood work and urinalysis prior to next visit        Anne Pat MD, MS, BENNIE YUNG  Clinical  - [employer]  Nephrologist - [facility]

## 2024-09-17 ENCOUNTER — HOSPITAL ENCOUNTER (OUTPATIENT)
Dept: RESPIRATORY THERAPY | Facility: HOSPITAL | Age: 84
Discharge: HOME | End: 2024-09-17
Payer: MEDICARE

## 2024-09-17 ENCOUNTER — TELEPHONE (OUTPATIENT)
Dept: NEPHROLOGY | Facility: CLINIC | Age: 84
End: 2024-09-17
Payer: MEDICARE

## 2024-09-17 ENCOUNTER — HOSPITAL ENCOUNTER (OUTPATIENT)
Dept: RESPIRATORY THERAPY | Facility: HOSPITAL | Age: 84
End: 2024-09-17
Payer: MEDICARE

## 2024-09-17 DIAGNOSIS — E79.0 HYPERURICEMIA: ICD-10-CM

## 2024-09-17 DIAGNOSIS — N18.32 STAGE 3B CHRONIC KIDNEY DISEASE (MULTI): Primary | ICD-10-CM

## 2024-09-17 DIAGNOSIS — J84.9 ILD (INTERSTITIAL LUNG DISEASE) (MULTI): ICD-10-CM

## 2024-09-17 DIAGNOSIS — I10 BENIGN ESSENTIAL HTN: ICD-10-CM

## 2024-09-17 DIAGNOSIS — I10 ESSENTIAL HYPERTENSION: ICD-10-CM

## 2024-09-17 LAB
25(OH)D3 SERPL-MCNC: 30 NG/ML (ref 30–100)
PTH-INTACT SERPL-MCNC: 168.8 PG/ML (ref 18.5–88)

## 2024-09-17 PROCEDURE — 94618 PULMONARY STRESS TESTING: CPT

## 2024-09-17 PROCEDURE — 94729 DIFFUSING CAPACITY: CPT

## 2024-09-17 PROCEDURE — 94060 EVALUATION OF WHEEZING: CPT | Performed by: PEDIATRICS

## 2024-09-17 PROCEDURE — 94618 PULMONARY STRESS TESTING: CPT | Performed by: PEDIATRICS

## 2024-09-17 PROCEDURE — 94060 EVALUATION OF WHEEZING: CPT

## 2024-09-17 RX ORDER — SPIRONOLACTONE 25 MG/1
12.5 TABLET ORAL DAILY
Qty: 90 TABLET | Refills: 1 | Status: SHIPPED | OUTPATIENT
Start: 2024-09-17

## 2024-09-17 RX ORDER — ALLOPURINOL 100 MG/1
200 TABLET ORAL DAILY
Qty: 30 TABLET | Refills: 0 | Status: SHIPPED | OUTPATIENT
Start: 2024-09-17

## 2024-09-17 NOTE — TELEPHONE ENCOUNTER
----- Message from Anne Pat sent at 9/17/2024  9:43 AM EDT -----  Please call son.  Blood work results mainly showed kidney function is worsening from last visit (dropped from 38 down to 27).  Uric acid is elevated.  I want to drop spironolactone down to half a pill (12.5 mg), increase allopurinol up to 2 tablets (200 mg).  Will follow-up as planned.  Please get blood work done prior to next visit  Dr. Bi BREWER

## 2024-09-18 ENCOUNTER — HOSPITAL ENCOUNTER (OUTPATIENT)
Dept: RESPIRATORY THERAPY | Facility: HOSPITAL | Age: 84
End: 2024-09-18
Payer: MEDICARE

## 2024-09-18 RX ORDER — TORSEMIDE 20 MG/1
TABLET ORAL
Qty: 192 TABLET | Refills: 1 | Status: SHIPPED | OUTPATIENT
Start: 2024-09-18

## 2024-09-19 ENCOUNTER — APPOINTMENT (OUTPATIENT)
Dept: PRIMARY CARE | Facility: CLINIC | Age: 84
End: 2024-09-19
Payer: MEDICARE

## 2024-09-19 ENCOUNTER — HOSPITAL ENCOUNTER (OUTPATIENT)
Dept: RESPIRATORY THERAPY | Facility: HOSPITAL | Age: 84
End: 2024-09-19
Payer: MEDICARE

## 2024-09-19 ENCOUNTER — HOSPITAL ENCOUNTER (OUTPATIENT)
Dept: RADIOLOGY | Facility: HOSPITAL | Age: 84
Discharge: HOME | End: 2024-09-19
Payer: MEDICARE

## 2024-09-19 ENCOUNTER — APPOINTMENT (OUTPATIENT)
Dept: RESPIRATORY THERAPY | Facility: HOSPITAL | Age: 84
End: 2024-09-19
Payer: MEDICARE

## 2024-09-19 VITALS
SYSTOLIC BLOOD PRESSURE: 124 MMHG | DIASTOLIC BLOOD PRESSURE: 68 MMHG | HEART RATE: 80 BPM | OXYGEN SATURATION: 96 % | TEMPERATURE: 96.3 F | HEIGHT: 65 IN | WEIGHT: 141.8 LBS | BODY MASS INDEX: 23.63 KG/M2

## 2024-09-19 DIAGNOSIS — M25.512 CHRONIC LEFT SHOULDER PAIN: ICD-10-CM

## 2024-09-19 DIAGNOSIS — G89.29 CHRONIC LEFT SHOULDER PAIN: ICD-10-CM

## 2024-09-19 DIAGNOSIS — L85.3 DRY SKIN DERMATITIS: Primary | ICD-10-CM

## 2024-09-19 DIAGNOSIS — J84.9 ILD (INTERSTITIAL LUNG DISEASE) (MULTI): ICD-10-CM

## 2024-09-19 DIAGNOSIS — N39.0 ACUTE UTI: ICD-10-CM

## 2024-09-19 LAB
MGC ASCENT PFT - FEV1 - POST: 1.09
MGC ASCENT PFT - FEV1 - POST: 1.09
MGC ASCENT PFT - FEV1 - PRE: 1.09
MGC ASCENT PFT - FEV1 - PRE: 1.09
MGC ASCENT PFT - FEV1 - PREDICTED: 1.98
MGC ASCENT PFT - FEV1 - PREDICTED: 1.98
MGC ASCENT PFT - FVC - POST: 1.45
MGC ASCENT PFT - FVC - POST: 1.45
MGC ASCENT PFT - FVC - PRE: 1.55
MGC ASCENT PFT - FVC - PRE: 1.55
MGC ASCENT PFT - FVC - PREDICTED: 2.63
MGC ASCENT PFT - FVC - PREDICTED: 2.63
POC APPEARANCE, URINE: CLEAR
POC BILIRUBIN, URINE: NEGATIVE
POC BLOOD, URINE: ABNORMAL
POC COLOR, URINE: YELLOW
POC GLUCOSE, URINE: NEGATIVE MG/DL
POC KETONES, URINE: NEGATIVE MG/DL
POC LEUKOCYTES, URINE: ABNORMAL
POC NITRITE,URINE: NEGATIVE
POC PH, URINE: 7.5 PH
POC PROTEIN, URINE: ABNORMAL MG/DL
POC SPECIFIC GRAVITY, URINE: 1.02
POC UROBILINOGEN, URINE: 0.2 EU/DL

## 2024-09-19 PROCEDURE — 1159F MED LIST DOCD IN RCRD: CPT | Performed by: FAMILY MEDICINE

## 2024-09-19 PROCEDURE — 81003 URINALYSIS AUTO W/O SCOPE: CPT | Performed by: FAMILY MEDICINE

## 2024-09-19 PROCEDURE — 1036F TOBACCO NON-USER: CPT | Performed by: FAMILY MEDICINE

## 2024-09-19 PROCEDURE — 1157F ADVNC CARE PLAN IN RCRD: CPT | Performed by: FAMILY MEDICINE

## 2024-09-19 PROCEDURE — 3078F DIAST BP <80 MM HG: CPT | Performed by: FAMILY MEDICINE

## 2024-09-19 PROCEDURE — 71250 CT THORAX DX C-: CPT

## 2024-09-19 PROCEDURE — 73030 X-RAY EXAM OF SHOULDER: CPT | Mod: LT

## 2024-09-19 PROCEDURE — 99214 OFFICE O/P EST MOD 30 MIN: CPT | Performed by: FAMILY MEDICINE

## 2024-09-19 PROCEDURE — 3074F SYST BP LT 130 MM HG: CPT | Performed by: FAMILY MEDICINE

## 2024-09-19 RX ORDER — NITROFURANTOIN 25; 75 MG/1; MG/1
100 CAPSULE ORAL 2 TIMES DAILY
Qty: 14 CAPSULE | Refills: 0 | Status: SHIPPED | OUTPATIENT
Start: 2024-09-19 | End: 2024-09-26

## 2024-09-19 RX ORDER — AMMONIUM LACTATE 12 G/100G
LOTION TOPICAL
Qty: 225 G | Refills: 1 | Status: SHIPPED | OUTPATIENT
Start: 2024-09-19

## 2024-09-19 NOTE — PROGRESS NOTES
"Subjective   Patient ID: Shaina Iverson is a 83 y.o. female who presents for Follow-up (6 mth ), Generalized Body Aches, and Leg Problem (Dry skin on calf. ).    HPI  Here for follow up  Still having left shoulder pain, has not yet gotten her xray done.   Patient also has dry skin in her lower legs, pruritic. Has not tried any regular lotion for it.   Has been having dysuria and urine urgency x 1 day. No fever. No pelvic pain.       Review of Systems  General: no fever  Eyes: no blurry vision  ENT: no sore throat, no ear pain  Resp: no cough, sob or wheezing  Cardio: no chest pain, no palpitations  Abd: no nausea/vomiting  : see HPI      /68   Pulse 80   Temp 35.7 °C (96.3 °F)   Ht 1.651 m (5' 5\")   Wt 64.3 kg (141 lb 12.8 oz)   SpO2 96%   BMI 23.60 kg/m²       Objective   Physical Exam  Gen: NAD, alert  Head: normocephalic/atraumatic  Eyes: conjunctivae normal  Ears: canals clear bilaterally, TM normal   Nose: external nose normal   Oropharynx: clear   Resp: Clear to auscultation  CVS: Regular rate and rhythm  Abdomen: soft, NT, ND  Ext: no edema, NT of lower extremities  Neuro: gait normal     Assessment/Plan   Problem List Items Addressed This Visit    None  Visit Diagnoses       Dry skin dermatitis    -  Primary    Relevant Medications    ammonium lactate (Lac-Hydrin) 12 % lotion    Chronic left shoulder pain        Relevant Orders    Referral to Orthopaedic Surgery  Xray shoulder    Acute UTI        Relevant Medications    nitrofurantoin, macrocrystal-monohydrate, (Macrobid) 100 mg capsule    Other Relevant Orders    POCT UA Automated manually resulted (Completed)    Urine Culture               "

## 2024-09-20 DIAGNOSIS — E79.0 HYPERURICEMIA: ICD-10-CM

## 2024-09-20 PROCEDURE — 87086 URINE CULTURE/COLONY COUNT: CPT

## 2024-09-21 LAB — BACTERIA UR CULT: ABNORMAL

## 2024-09-23 DIAGNOSIS — N39.0 ACUTE UTI: Primary | ICD-10-CM

## 2024-09-23 RX ORDER — ALLOPURINOL 100 MG/1
200 TABLET ORAL DAILY
Qty: 180 TABLET | Refills: 3 | Status: SHIPPED | OUTPATIENT
Start: 2024-09-23 | End: 2025-09-23

## 2024-09-30 ENCOUNTER — APPOINTMENT (OUTPATIENT)
Dept: ORTHOPEDIC SURGERY | Facility: CLINIC | Age: 84
End: 2024-09-30
Payer: MEDICARE

## 2024-09-30 ENCOUNTER — LAB (OUTPATIENT)
Dept: LAB | Facility: LAB | Age: 84
End: 2024-09-30
Payer: MEDICARE

## 2024-09-30 DIAGNOSIS — N18.32 STAGE 3B CHRONIC KIDNEY DISEASE (MULTI): ICD-10-CM

## 2024-09-30 DIAGNOSIS — M25.512 CHRONIC LEFT SHOULDER PAIN: ICD-10-CM

## 2024-09-30 DIAGNOSIS — G89.29 CHRONIC LEFT SHOULDER PAIN: ICD-10-CM

## 2024-09-30 DIAGNOSIS — N39.0 ACUTE UTI: ICD-10-CM

## 2024-09-30 DIAGNOSIS — M19.012 OSTEOARTHRITIS OF LEFT SHOULDER, UNSPECIFIED OSTEOARTHRITIS TYPE: Primary | ICD-10-CM

## 2024-09-30 DIAGNOSIS — I10 BENIGN ESSENTIAL HTN: ICD-10-CM

## 2024-09-30 DIAGNOSIS — I50.20 SYSTOLIC HEART FAILURE, UNSPECIFIED HF CHRONICITY: ICD-10-CM

## 2024-09-30 LAB
ALBUMIN SERPL BCP-MCNC: 4.2 G/DL (ref 3.4–5)
ANION GAP SERPL CALC-SCNC: 16 MMOL/L (ref 10–20)
BUN SERPL-MCNC: 33 MG/DL (ref 6–23)
CALCIUM SERPL-MCNC: 9.9 MG/DL (ref 8.6–10.3)
CHLORIDE SERPL-SCNC: 101 MMOL/L (ref 98–107)
CO2 SERPL-SCNC: 25 MMOL/L (ref 21–32)
CREAT SERPL-MCNC: 1.5 MG/DL (ref 0.5–1.05)
EGFRCR SERPLBLD CKD-EPI 2021: 35 ML/MIN/1.73M*2
GLUCOSE SERPL-MCNC: 77 MG/DL (ref 74–99)
PHOSPHATE SERPL-MCNC: 3.7 MG/DL (ref 2.5–4.9)
POTASSIUM SERPL-SCNC: 4.4 MMOL/L (ref 3.5–5.3)
SODIUM SERPL-SCNC: 138 MMOL/L (ref 136–145)

## 2024-09-30 PROCEDURE — 1036F TOBACCO NON-USER: CPT | Performed by: ORTHOPAEDIC SURGERY

## 2024-09-30 PROCEDURE — 1159F MED LIST DOCD IN RCRD: CPT | Performed by: ORTHOPAEDIC SURGERY

## 2024-09-30 PROCEDURE — 82570 ASSAY OF URINE CREATININE: CPT

## 2024-09-30 PROCEDURE — 36415 COLL VENOUS BLD VENIPUNCTURE: CPT

## 2024-09-30 PROCEDURE — 1157F ADVNC CARE PLAN IN RCRD: CPT | Performed by: ORTHOPAEDIC SURGERY

## 2024-09-30 PROCEDURE — 20611 DRAIN/INJ JOINT/BURSA W/US: CPT | Performed by: ORTHOPAEDIC SURGERY

## 2024-09-30 PROCEDURE — 99203 OFFICE O/P NEW LOW 30 MIN: CPT | Performed by: ORTHOPAEDIC SURGERY

## 2024-09-30 PROCEDURE — 1160F RVW MEDS BY RX/DR IN RCRD: CPT | Performed by: ORTHOPAEDIC SURGERY

## 2024-09-30 PROCEDURE — 82043 UR ALBUMIN QUANTITATIVE: CPT

## 2024-09-30 PROCEDURE — 87086 URINE CULTURE/COLONY COUNT: CPT

## 2024-09-30 PROCEDURE — 87186 SC STD MICRODIL/AGAR DIL: CPT

## 2024-10-01 ENCOUNTER — APPOINTMENT (OUTPATIENT)
Dept: PULMONOLOGY | Facility: CLINIC | Age: 84
End: 2024-10-01
Payer: MEDICARE

## 2024-10-01 DIAGNOSIS — J84.9 ILD (INTERSTITIAL LUNG DISEASE) (MULTI): Primary | ICD-10-CM

## 2024-10-01 DIAGNOSIS — J45.40 MODERATE PERSISTENT ASTHMA WITHOUT COMPLICATION (HHS-HCC): ICD-10-CM

## 2024-10-01 LAB
CREAT UR-MCNC: 31.5 MG/DL (ref 20–320)
MICROALBUMIN UR-MCNC: 7.3 MG/L
MICROALBUMIN/CREAT UR: 23.2 UG/MG CREAT

## 2024-10-01 PROCEDURE — 99214 OFFICE O/P EST MOD 30 MIN: CPT | Performed by: INTERNAL MEDICINE

## 2024-10-01 PROCEDURE — 1157F ADVNC CARE PLAN IN RCRD: CPT | Performed by: INTERNAL MEDICINE

## 2024-10-01 PROCEDURE — 1036F TOBACCO NON-USER: CPT | Performed by: INTERNAL MEDICINE

## 2024-10-01 RX ORDER — LIDOCAINE HYDROCHLORIDE 10 MG/ML
3 INJECTION, SOLUTION INFILTRATION; PERINEURAL
Status: COMPLETED | OUTPATIENT
Start: 2024-09-30 | End: 2024-09-30

## 2024-10-01 RX ORDER — ALBUTEROL SULFATE 90 UG/1
2 INHALANT RESPIRATORY (INHALATION) EVERY 4 HOURS PRN
Qty: 8.5 G | Refills: 5 | Status: SHIPPED | OUTPATIENT
Start: 2024-10-01 | End: 2025-10-01

## 2024-10-01 RX ORDER — FLUTICASONE FUROATE AND VILANTEROL 100; 25 UG/1; UG/1
1 POWDER RESPIRATORY (INHALATION) DAILY
Qty: 60 EACH | Refills: 3 | Status: SHIPPED | OUTPATIENT
Start: 2024-10-01

## 2024-10-01 ASSESSMENT — ENCOUNTER SYMPTOMS
WHEEZING: 0
TROUBLE SWALLOWING: 0
WOUND: 0
CHILLS: 0
SHORTNESS OF BREATH: 0
ARTHRALGIAS: 1
JOINT SWELLING: 1
FEVER: 0
FATIGUE: 0
SINUS PRESSURE: 0

## 2024-10-01 NOTE — PATIENT INSTRUCTIONS
Mrs. Iverson it was a pleasure seeing you in the office today. We discussed the following:     -Your CT scan breathing test is stable  -You will try to accumulate 30 minutes of moderate physical activity almost every day.   -we will start you on a new inhaler for hyperactive airway disease called Breo Ellipta that you use every day and a rescue inhaler called proair that you use only as needed       We will see her back in clinic in 3 months with repeat breathing

## 2024-10-01 NOTE — PROGRESS NOTES
Chief Complaint     An interactive audio and video telecommunication system which permits real time communications between the patient (at the originating site) and provider (at the distant site) was utilized to provide this telehealth service.   PFT 6 min walk results      History of Present Illness  Mrs. Iverson is a 83 y.o. woman, never a smoker being evaluated for cough      PCP: Dr. Odonnell      HPI:  At baseline, she has dyspnea on exertion, but none at rest. Her symptoms started a few months ago.   She currently sits for most of the day, works inside the house, but does not carry loads and do strenuous exercise. She has to stop for breath after walking about 100 meters or a few minutes (mMRC 3). Her CAT score is 22. She also denies orthopnea, pnd, or hernan. Her weight has been mostly stable. She also relates cough, wheezing, and clear, sputum. Has a night cough. No hemoptysis. No fever or shivering chills. She has a runny nose, and a tingling sensation in the back of his throat.. She denies chest pain or heartburn. [place] score (ESS) is 16, because of nighttime coughing. She tried a prednisone taper and a Z-Jasen which did not improve her symptoms. She had a chest CT which could be pulmonary fibrosis she needs a high resolution chest CT. Her PFT did show restriction.     08/18/2020: Since the last visit, patient's breathing is mostly unchanged. No new chest pain, cough, sputum, fever, chills or night sweats. Had repeat PFTs done (results below).      05/11/2021: Since the last visit, patient's breathing has been worsening. CAT 27. In January she had a fall with a broken hip and was hospitalized. She has been having a hard time with the management of her heart failure, often retaining water, needing to increase her water pill based on her weight. She has not been following up with a heart specialist. Over the last week, she has noticed an increased cough with clear sputum. No hemoptysis. No fever chills or  night sweats. No chest pain. She had repeat testing done including spiral DLCO 6-minute walk test and a CT scan of her chest (all results below).     09/21/2021: Since her last visit she feels like her breathing has been progressively worse. She cannot identify a moment where it got acutely worse it has been a slow steady decline. No new fever chills night sweats she continues to cough but does not bring up any phlegm. She had repeat PFTs and 6-minute walk test with the results below.     11/16/2021: Since the last visit, patient's breathing is mostly unchanged. No new chest pain, cough, sputum, fever, chills or night sweats. Patient had repeat CT done (results below).      08/09/2022: Since her last visit she was admitted to Shock for 2 days in the setting of volume overload. She was aggressively diuresed and lost about 15 to 20 pounds. She was also found to be anemic in the setting of being on dual antiplatelet therapy and anticoagulation. She was thought to have GI bleed as a cause of her anemia. Since she has made a good recovery. Her breathing is now back to baseline. No new cough phlegm sputum fevers or chills. She is being evaluated for hip replacement therapy. She had breathing test done (results below).     11/01/2022: Since the last visit, patient's breathing is mostly unchanged. No new chest pain, cough, sputum, fever, chills or night sweats. Had her hip surgery rescheduled.      06/06/2023: Since the last visit, patient's breathing is mostly unchanged. No new chest pain, cough, sputum, fever, chills or night sweats. Got hip replacement in December 2022. Has recovered well a few weeks after the surgery. She was found to have a ? mass in her bladder and is scheduled for cystoscopy.     Since the last visit, patient's breathing is mostly unchanged. No hospital admissions or ED visits. No new chest pain, cough, sputum, fever, chills or night sweats. She got the cystoscopy without major concerns. Feels some  variation in her breathing due to the weather. Has not needed oxygen since her last visit. Patient had repeat breathing test, 6MWT done (results below).      10/1/2024 : Since the last visit, patient's breathing is mostly unchanged. No hospital admissions or ED visits. No new chest pain, cough, sputum, fever, chills or night sweats. Patient had repeat breathing test, 6MWT, HRCT done (results below).     Previous pulmonary history:   She has no history of recurrent infections, or lung disease as a child. She had no previous lung hx, never on oxygen or inhaler therapy.      Inhalers/nebulized medications: Spiriva     Hospitalization History:  She has not been hospitalized over the last year for breathing related problem.     Sleep history:  Denies snoring, apneas, feeling tired during the day or taking naps during the day. Admits to feeling tired during the day.     Comorbidities:  Arthritis  AAA  Aortic stenosis  HTN  CAD  PHTN  Veena     SH:  smoking: never a smoker but had second hand exposure   drinking: none  illicit drug use: none     Occupation: (Full questionnaire on exposures obtained, discussed with the patient and scanned to EMR)  owned a bar  No known exposure to asbestos, silica or beryllium     Family History:  No family history of lung diseases or cancer     Imaging history: (I have personally reviewed the imaging below)  09/19/24 CT with stable fibrotic changes and lung nodules. Air trapping noted.   11/2023 CT with stable fibrotic changes and stable lung nodule (largest 8mm at the base of the left lung). Dilated PA at 4cm  10/18/2022 CT mostly unchanged  10/27/2021: CT chest with mostly unchanged scarring at the bases  01/28/2021 - Chest CT with subpleural reticulation and areas of wavy consolidation. Small R sided effusion.   11/21/19 - Chest CT - early subpleural fibrotic changes, small subcentimeter nodules     PFTs:  09/17/2024 -> Ratio of 0.75/FEV1 1.09L (55%) (no BD response)/FVC 1.45L  (55%)/DLCO 50->85%  05/11/2023 -> Ratio of 0.76/FEV1 1.32L (80%) (no BD response)/FVC 1.75L (78%)/TLC 76%/RVtoTLC ratio 0.5/DLCO 48%  10/984783 -> Ratio of 0.79/FEV1 1.35L (80%)(no BD response)/FVC 1.72L (75%)/DLCO 43%  08/02/2022 -> Ratio of 0.81/FEV1 1.36L (80%)(no BD response)/FVC 1.67L (73%)/DLCO 94%  09/08/2021 -> Ratio of 0.77/FEV1 1.38L (80%) (no BD response)/FVC 1.79L (77%)/TLC 83%/RVtoTLC ratio 0.48/DLCO 47%   03/23/2021 -> Ratio of 0.81/FEV1 1.49L (86%) (no BD response)/FVC 1.89L (79%)/DLCO 48%  08/10/2020 -> Ratio of 0.76/FEV1 1.48L (84%)/FVC 1.93L (82%)/TLC 78%/RVtoTLC ratio 0.43/DLCO 42%  2019 // -> FEV1/FVC ratio .80 /FEV1 71% (no BD response)/FVC 66% /DLCO 46%      6 MWTs:   09/17/2024 ->on RA, 82m. Peak SpO2 of 99%. Michael SpO2 100%.   05/11/2023 ->on RA, 74m. Peak SpO2 of 98%. Michael SpO2 98%.   09/08/2021 ->on 2L, 70m. Peak SpO2 of 100%. Michael SpO2 98%.   03/23/2021 ->on RA, 140m. Peak SpO2 of 100%. Michael SpO2 99%.      Lung biopsy: None on record     Echo:  2018-> 40-45% EF, RVSP 50.4, LA severely dilated, RA moderately dilated     Labs:  : Hb 10.6 , Eos <250, Bicarb 26 , Creat 1.52       Review of Systems  Detailed review of system (10 systems) form filled out by the patient. I have reviewed this form with the patient and scanned into the EMR.     There were no vitals filed for this visit.     PHYSICAL EXAMINATION  Constitutional: Alert and oriented. In no acute distress. Well developed, well nourished.  Head and Face: Normal.   Oropharynx: normal.  Neck: No neck mass observed.  Pulmonary: Chest is normal to inspection. No increased work of breathing or signs of respiratory distress.   CV:  No obvious peripheral edema.  MSK: normal gait and station. No clubbing or cyanosis of the fingernails.  Skin: Normal skin color and pigmentation, normal skin turgor, and no rash.  Neurologic:  EOMI. Moving all 4 extremities.  Psychiatric: Intact judgement and insight.     Medication Documentation Review Audit        Reviewed by Nic Orantes MD (Physician) on 10/01/24 at 0748      Medication Order Taking? Sig Documenting Provider Last Dose Status   allopurinol (Zyloprim) 100 mg tablet [id number]  Take 2 tablets (200 mg) by mouth once daily. Anne Pat MD  Active   ammonium lactate (Lac-Hydrin) 12 % lotion [id number]  Apply thin layer at least 2-3 times a day as needed for dry skin Steven Hdz MD  Active   apixaban (Eliquis) 2.5 mg tablet [id number] No TAKE ONE TABLET BY MOUTH TWO TIMES A DAY Steven Hdz MD Taking Active   clopidogrel (Plavix) 75 mg tablet [id number] No TAKE ONE TABLET BY MOUTH EVERY DAY Steven Hdz MD Taking Active   FeroSuL tablet [id number] No TAKE ONE TABLET BY MOUTH TWO TIMES A DAY Steven Hdz MD Taking Active   gabapentin (Neurontin) 300 mg capsule [id number] No TAKE ONE CAPSULE BY MOUTH TWO TIMES A DAY   Patient taking differently: Take 1 capsule (300 mg) by mouth once daily at bedtime.    Steven Hdz MD Taking Active   levothyroxine (Synthroid, Levoxyl) 88 mcg tablet [id number] No TAKE ONE TABLET BY MOUTH EVERY DAY BEFORE BREAKFAST ON AN EMPTY STOMACH. Steven Hdz MD Taking Active   magnesium oxide (Mag-Ox) 400 mg (241.3 mg magnesium) tablet [id number] No TAKE ONE TABLET BY MOUTH ONCE DAILY Steven Hdz MD Taking Active   metoprolol succinate XL (Toprol-XL) 25 mg 24 hr tablet [id number] No TAKE ONE TABLET BY MOUTH AT BEDTIME DEV Booker Taking Active   nitrofurantoin, macrocrystal-monohydrate, (Macrobid) 100 mg capsule [id number]  Take 1 capsule (100 mg) by mouth 2 times a day for 7 days. Steven Hdz MD   24 [id number]   pantoprazole (ProtoNix) 40 mg EC tablet [id number] No TAKE ONE TABLET BY MOUTH TWO TIMES A DAY Steven Hdz MD Taking Active   spironolactone (Aldactone) 25 mg tablet [id number] No Take 0.5 tablets (12.5 mg) by mouth once daily. Anne AUGUSTE  MD Bi Taking Active   tiZANidine (Zanaflex) 2 mg tablet [id number] No Take 1 tablet (2 mg) by mouth every 12 hours if needed (back pain). Steven Hdz MD Taking Active   torsemide (Demadex) 20 mg tablet [id number] No TAKE 2 TABLETS BY MOUTH ONCE DAILY, EXCEPT THURSDAY TAKE 4 TABLETS. Steven Hdz MD Taking Active   triamcinolone (Kenalog) 0.1 % cream [id number] No Apply to affected area 1-2 times daily as needed for rash . Avoid face and groin. Steven Hdz MD Taking Active   valsartan (Diovan) 40 mg tablet [id number] No TAKE ONE TABLET BY MOUTH DAILY Anne Pat MD Taking Active                   Provider Impressions     # Hyperactive airway disease:  -allergy symptoms with large variability in FEV1 over time with no structural CT changes. Air trapping noted on CT consistent with small airway disease.  -will start breo Ellipta and prn albuterol.    # Pulmonary fibrosis:  - No known occupational exposure or to medication. Negative serology for CTD  -CT with indeterminate pattern  - PFT shows restriction but stable  - Does have a history of arthritis  -Stable breathing test, however decrease in 6-minute walk test and increased oxygen requirement. Could be related to ILD progression, however could also be related to her heart failure.  11/2023: CT chest with mostly unchanged scarring at the bases     #Chronic hypoxic respiratory failure:  -We will set up for home oxygen. She will need 2 L of supplemental oxygen with exertion. Improving, have not been wearing her O2     # lung nodules:  -repeat CT in 01/2023. Stable. Will continue to follow on repeat imaging     # Heart failure, S/P aortic valve replacement, pacemaker   - Can contribute to SOB. euvolemic on exam today   - Continue to f/u with cardiology     # Shortness of breath   - Most likely multifactorial   - Stopped Trelegy and start Spiriva    # Dilated PA on CT:  -will get echo to evaluate for PAH      Follow up  in 6 months or sooner if needed.

## 2024-10-01 NOTE — PROGRESS NOTES
Reason for Appointment  No chief complaint on file.    History of Present Illness  New patient is a 83 y.o. female here today for evaluation of her left shoulder.  She has had left shoulder pain for the last few years but slowly increasing.  X-rays taken previously do show avascular necrosis of the humeral head with severe glenohumeral joint arthritis and sternal wires.  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  History of open heart surgery and aortic valve replacement as well as A-fib.  She is on Eliquis and Plavix.  She has not had any treatment on the shoulder, no previous surgeries. She does use a walker for ambulation long distances.    Past Medical History:   Diagnosis Date    Aneurysm of the ascending aorta, without rupture ([facility]) 01/28/2020    Ascending aortic aneurysm    Atherosclerotic heart disease of native coronary artery with other forms of angina pectoris 11/15/2019    Coronary artery disease with other form of angina pectoris    Gross hematuria 09/08/2023    H/O ascending aortic replacement 02/01/2023    Heart failure, unspecified 08/26/2019    CHF exacerbation    History of GI bleed 09/08/2023    History of total hip replacement 09/08/2023    Hypertension     Nonrheumatic aortic (valve) insufficiency 06/23/2022    Severe aortic regurgitation    S/P cardiac pacemaker procedure 02/01/2023    S/P coronary artery stent placement 02/01/2023    Severe aortic regurgitation 02/01/2023    Sinus node dysfunction (Multi) 02/01/2023    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    Status post right hip replacement 09/08/2023    Tibia fracture 02/01/2023       Past Surgical History:   Procedure Laterality Date    BLADDER SURGERY  04/06/2017    Bladder Surgery    CARDIAC PACEMAKER PLACEMENT  04/06/2017    Pacemaker Placement    HYSTERECTOMY  04/06/2017    Hysterectomy    OTHER SURGICAL HISTORY  09/21/2017    Total Hip  Replacement Left    OTHER SURGICAL HISTORY  2017    Previous Stent Placement    OTHER SURGICAL HISTORY  2019    Aortic valve replacement       Medication Documentation Review Audit       Reviewed by Nic Orantes MD (Physician) on 10/01/24 at 0748      Medication Order Taking? Sig Documenting Provider Last Dose Status   allopurinol (Zyloprim) 100 mg tablet [id number]  Take 2 tablets (200 mg) by mouth once daily. Anne Pat MD  Active   ammonium lactate (Lac-Hydrin) 12 % lotion [id number]  Apply thin layer at least 2-3 times a day as needed for dry skin Steven Hdz MD  Active   apixaban (Eliquis) 2.5 mg tablet [id number] No TAKE ONE TABLET BY MOUTH TWO TIMES A DAY Steven Hdz MD Taking Active   clopidogrel (Plavix) 75 mg tablet [id number] No TAKE ONE TABLET BY MOUTH EVERY DAY Steven Hdz MD Taking Active   FeroSuL tablet [id number] No TAKE ONE TABLET BY MOUTH TWO TIMES A DAY Steven Hdz MD Taking Active   gabapentin (Neurontin) 300 mg capsule [id number] No TAKE ONE CAPSULE BY MOUTH TWO TIMES A DAY   Patient taking differently: Take 1 capsule (300 mg) by mouth once daily at bedtime.    Steven Hdz MD Taking Active   levothyroxine (Synthroid, Levoxyl) 88 mcg tablet [id number] No TAKE ONE TABLET BY MOUTH EVERY DAY BEFORE BREAKFAST ON AN EMPTY STOMACH. Steven Hdz MD Taking Active   magnesium oxide (Mag-Ox) 400 mg (241.3 mg magnesium) tablet [id number] No TAKE ONE TABLET BY MOUTH ONCE DAILY Steven Hdz MD Taking Active   metoprolol succinate XL (Toprol-XL) 25 mg 24 hr tablet [id number] No TAKE ONE TABLET BY MOUTH AT BEDTIME SUSIE Booker-CNP Taking Active   nitrofurantoin, macrocrystal-monohydrate, (Macrobid) 100 mg capsule [id number]  Take 1 capsule (100 mg) by mouth 2 times a day for 7 days. Steven Hdz MD   24 [id number]   pantoprazole (ProtoNix) 40 mg EC tablet [id number] No TAKE  ONE TABLET BY MOUTH TWO TIMES A DAY Steven Hdz MD Taking Active   spironolactone (Aldactone) 25 mg tablet [id number] No Take 0.5 tablets (12.5 mg) by mouth once daily. Anne Pat MD Taking Active   tiZANidine (Zanaflex) 2 mg tablet [id number] No Take 1 tablet (2 mg) by mouth every 12 hours if needed (back pain). Steven Hdz MD Taking Active   torsemide (Demadex) 20 mg tablet [id number] No TAKE 2 TABLETS BY MOUTH ONCE DAILY, EXCEPT THURSDAY TAKE 4 TABLETS. Steven Hdz MD Taking Active   triamcinolone (Kenalog) 0.1 % cream [id number] No Apply to affected area 1-2 times daily as needed for rash . Avoid face and groin. Steven Hdz MD Taking Active   valsartan (Diovan) 40 mg tablet [id number] No TAKE ONE TABLET BY MOUTH DAILY Anne Pat MD Taking Active                    Allergies   Allergen Reactions    Farxiga [Dapagliflozin] Unknown    Iodides Unknown    Iodinated Contrast Media Unknown     Ready-Box MISC    Ketorolac Psychosis    Lisinopril Unknown    Nsaids (Non-Steroidal Anti-Inflammatory Drug) Unknown    Shellfish Containing Products Unknown       Review of Systems   Constitutional:  Negative for chills, fatigue and fever.   HENT:  Negative for mouth sores, sinus pressure and trouble swallowing.    Respiratory:  Negative for shortness of breath and wheezing.    Cardiovascular:  Negative for chest pain and leg swelling.   Musculoskeletal:  Positive for arthralgias and joint swelling.   Skin:  Negative for rash and wound.     Exam   On exam patient is alert, awake, and in no acute distress.  Head is normocephalic, no JVD, no auditory wheezes.  She has pseudoparalysis and about 30 degrees of active forward flexion today in the left shoulder, severe crepitation movement of the shoulder.  Tender over the anterior joint line.  Decent elbow, wrist, and digital motion, DJD in the hands.  Skin is warm and dry, without ulcerations, no other swelling  lymphadenopathy.  Good pulses and sensation in the upper extremity.    Assessment   Encounter Diagnosis   Name Primary?    Chronic left shoulder pain    Left shoulder osteoarthritis    Plan   We discussed long-term options including a reverse shoulder replacement but with her multiple comorbidities and significant cardiac history, there are thickened risks with proceeding with any surgery.  She would like to try a simple injection for send the left shoulder to see how much relief this gives her.  We sterilely injected under ultrasound guidance Kenalog and lidocaine into the left shoulder acromial space.  Patient understands the small risk of infection and the signs to look for as well as flare action.  Hopefully this gives her good relief.  She can follow-up with us as needed on a 3 to 6-month basis for injections.    L Inj/Asp: L subacromial bursa on 9/30/2024 3:04 PM  Indications: pain  Details: 22 G needle, ultrasound-guided  Medications: 3 mL lidocaine 10 mg/mL (1 %); 30 mg triamcinolone acetonide 10 mg/mL  Outcome: tolerated well, no immediate complications    After discussing the risks and benefits of the procedure with proceeded with an injection.  Using ultrasound guidance we identified the acromion, humeral head and the subacromial bursa, images obtained. We then sterilely injected the left subacrominal space with a mixture of 30 mg of Kenalog and 2 cc of 1 % lidocaine. Pt tolerated the procedure well without any adverse reactions.   Procedure, treatment alternatives, risks and benefits explained, specific risks discussed. Consent was given by the patient. Immediately prior to procedure a time out was called to verify the correct patient, procedure, equipment, support staff and site/side marked as required. Patient was prepped and draped in the usual sterile fashion.       Written by Khadra Orantes saw, evaluated, and treated the patient with the PA

## 2024-10-03 DIAGNOSIS — N39.0 ACUTE UTI: Primary | ICD-10-CM

## 2024-10-03 LAB — BACTERIA UR CULT: ABNORMAL

## 2024-10-03 RX ORDER — CIPROFLOXACIN 500 MG/1
500 TABLET ORAL 2 TIMES DAILY
Qty: 6 TABLET | Refills: 0 | Status: SHIPPED | OUTPATIENT
Start: 2024-10-03 | End: 2024-10-06

## 2024-10-08 ENCOUNTER — APPOINTMENT (OUTPATIENT)
Dept: PULMONOLOGY | Facility: CLINIC | Age: 84
End: 2024-10-08
Payer: MEDICARE

## 2024-10-08 DIAGNOSIS — I48.91 ATRIAL FIBRILLATION, UNSPECIFIED TYPE (MULTI): ICD-10-CM

## 2024-10-08 DIAGNOSIS — I10 ESSENTIAL HYPERTENSION: ICD-10-CM

## 2024-10-09 RX ORDER — APIXABAN 2.5 MG/1
TABLET, FILM COATED ORAL
Qty: 60 TABLET | Refills: 3 | Status: SHIPPED | OUTPATIENT
Start: 2024-10-09

## 2024-10-09 RX ORDER — VALSARTAN 40 MG/1
40 TABLET ORAL DAILY
Qty: 90 TABLET | Refills: 0 | Status: SHIPPED | OUTPATIENT
Start: 2024-10-09

## 2024-10-31 ENCOUNTER — TELEPHONE (OUTPATIENT)
Dept: PRIMARY CARE | Facility: CLINIC | Age: 84
End: 2024-10-31
Payer: MEDICARE

## 2024-10-31 DIAGNOSIS — R39.9 URINARY SYMPTOM OR SIGN: Primary | ICD-10-CM

## 2024-11-01 ENCOUNTER — LAB (OUTPATIENT)
Dept: LAB | Facility: LAB | Age: 84
End: 2024-11-01
Payer: MEDICARE

## 2024-11-01 DIAGNOSIS — R39.9 URINARY SYMPTOM OR SIGN: ICD-10-CM

## 2024-11-01 PROCEDURE — 87086 URINE CULTURE/COLONY COUNT: CPT

## 2024-11-01 PROCEDURE — 81001 URINALYSIS AUTO W/SCOPE: CPT

## 2024-11-02 LAB
APPEARANCE UR: CLEAR
BILIRUB UR STRIP.AUTO-MCNC: NEGATIVE MG/DL
COLOR UR: ABNORMAL
GLUCOSE UR STRIP.AUTO-MCNC: NORMAL MG/DL
KETONES UR STRIP.AUTO-MCNC: NEGATIVE MG/DL
LEUKOCYTE ESTERASE UR QL STRIP.AUTO: ABNORMAL
NITRITE UR QL STRIP.AUTO: NEGATIVE
PH UR STRIP.AUTO: 8.5 [PH]
PROT UR STRIP.AUTO-MCNC: ABNORMAL MG/DL
RBC # UR STRIP.AUTO: NEGATIVE /UL
RBC #/AREA URNS AUTO: NORMAL /HPF
SP GR UR STRIP.AUTO: 1.01
UROBILINOGEN UR STRIP.AUTO-MCNC: NORMAL MG/DL
WBC #/AREA URNS AUTO: NORMAL /HPF

## 2024-11-03 LAB — BACTERIA UR CULT: ABNORMAL

## 2024-11-04 DIAGNOSIS — N39.0 ACUTE UTI: Primary | ICD-10-CM

## 2024-11-04 RX ORDER — CIPROFLOXACIN 500 MG/1
500 TABLET ORAL 2 TIMES DAILY
Qty: 10 TABLET | Refills: 0 | Status: SHIPPED | OUTPATIENT
Start: 2024-11-04 | End: 2024-11-09

## 2024-11-13 ENCOUNTER — TELEPHONE (OUTPATIENT)
Dept: PRIMARY CARE | Facility: CLINIC | Age: 84
End: 2024-11-13
Payer: MEDICARE

## 2024-11-13 ENCOUNTER — CLINICAL SUPPORT (OUTPATIENT)
Dept: PRIMARY CARE | Facility: CLINIC | Age: 84
End: 2024-11-13
Payer: MEDICARE

## 2024-11-13 DIAGNOSIS — R39.9 URINARY SYMPTOM OR SIGN: ICD-10-CM

## 2024-11-13 LAB
POC APPEARANCE, URINE: CLEAR
POC BILIRUBIN, URINE: NEGATIVE
POC BLOOD, URINE: ABNORMAL
POC COLOR, URINE: YELLOW
POC GLUCOSE, URINE: NEGATIVE MG/DL
POC KETONES, URINE: NEGATIVE MG/DL
POC LEUKOCYTES, URINE: ABNORMAL
POC NITRITE,URINE: NEGATIVE
POC PH, URINE: 7 PH
POC PROTEIN, URINE: NEGATIVE MG/DL
POC SPECIFIC GRAVITY, URINE: 1.01
POC UROBILINOGEN, URINE: 0.2 EU/DL

## 2024-11-13 PROCEDURE — 81003 URINALYSIS AUTO W/O SCOPE: CPT | Performed by: FAMILY MEDICINE

## 2024-11-13 PROCEDURE — 87086 URINE CULTURE/COLONY COUNT: CPT

## 2024-11-13 NOTE — TELEPHONE ENCOUNTER
Will you please put in an order for a urine culture at the lab, Dr. Salgado wanted to recheck after her abx finished.  She left a sample in our office.

## 2024-11-15 DIAGNOSIS — R39.9 URINARY SYMPTOM OR SIGN: Primary | ICD-10-CM

## 2024-11-15 LAB — BACTERIA UR CULT: ABNORMAL

## 2024-11-18 ENCOUNTER — LAB (OUTPATIENT)
Dept: LAB | Facility: LAB | Age: 84
End: 2024-11-18
Payer: MEDICARE

## 2024-11-18 DIAGNOSIS — R39.9 URINARY SYMPTOM OR SIGN: ICD-10-CM

## 2024-11-18 PROCEDURE — 87077 CULTURE AEROBIC IDENTIFY: CPT

## 2024-11-18 PROCEDURE — 87086 URINE CULTURE/COLONY COUNT: CPT

## 2024-11-18 PROCEDURE — 87181 SC STD AGAR DILUTION PER AGT: CPT

## 2024-11-21 DIAGNOSIS — N39.0 ACUTE UTI: Primary | ICD-10-CM

## 2024-11-21 LAB — BACTERIA UR CULT: ABNORMAL

## 2024-11-21 RX ORDER — CEFDINIR 300 MG/1
300 CAPSULE ORAL 2 TIMES DAILY
Qty: 14 CAPSULE | Refills: 0 | Status: SHIPPED | OUTPATIENT
Start: 2024-11-21 | End: 2024-11-28

## 2024-12-17 ENCOUNTER — HOSPITAL ENCOUNTER (EMERGENCY)
Facility: HOSPITAL | Age: 84
Discharge: HOME | End: 2024-12-17
Attending: EMERGENCY MEDICINE
Payer: MEDICARE

## 2024-12-17 ENCOUNTER — APPOINTMENT (OUTPATIENT)
Dept: RADIOLOGY | Facility: HOSPITAL | Age: 84
End: 2024-12-17
Payer: MEDICARE

## 2024-12-17 VITALS
TEMPERATURE: 98.8 F | WEIGHT: 140 LBS | HEIGHT: 64 IN | SYSTOLIC BLOOD PRESSURE: 139 MMHG | HEART RATE: 65 BPM | DIASTOLIC BLOOD PRESSURE: 75 MMHG | BODY MASS INDEX: 23.9 KG/M2 | RESPIRATION RATE: 18 BRPM | OXYGEN SATURATION: 96 %

## 2024-12-17 DIAGNOSIS — M25.562 ACUTE PAIN OF LEFT KNEE: Primary | ICD-10-CM

## 2024-12-17 PROCEDURE — 2500000004 HC RX 250 GENERAL PHARMACY W/ HCPCS (ALT 636 FOR OP/ED)

## 2024-12-17 PROCEDURE — 99283 EMERGENCY DEPT VISIT LOW MDM: CPT | Mod: 25 | Performed by: EMERGENCY MEDICINE

## 2024-12-17 PROCEDURE — 73562 X-RAY EXAM OF KNEE 3: CPT | Mod: LT

## 2024-12-17 PROCEDURE — 73562 X-RAY EXAM OF KNEE 3: CPT | Mod: LEFT SIDE | Performed by: RADIOLOGY

## 2024-12-17 PROCEDURE — 20610 DRAIN/INJ JOINT/BURSA W/O US: CPT | Mod: LT | Performed by: EMERGENCY MEDICINE

## 2024-12-17 RX ORDER — LIDOCAINE HYDROCHLORIDE AND EPINEPHRINE 10; 20 UG/ML; MG/ML
INJECTION, SOLUTION INFILTRATION; PERINEURAL
Status: COMPLETED
Start: 2024-12-17 | End: 2024-12-17

## 2024-12-17 ASSESSMENT — PAIN DESCRIPTION - ORIENTATION
ORIENTATION: LEFT
ORIENTATION: LEFT

## 2024-12-17 ASSESSMENT — PAIN - FUNCTIONAL ASSESSMENT
PAIN_FUNCTIONAL_ASSESSMENT: 0-10
PAIN_FUNCTIONAL_ASSESSMENT: 0-10

## 2024-12-17 ASSESSMENT — PAIN SCALES - GENERAL
PAINLEVEL_OUTOF10: 8
PAINLEVEL_OUTOF10: 7

## 2024-12-17 ASSESSMENT — PAIN DESCRIPTION - PAIN TYPE
TYPE: ACUTE PAIN
TYPE: ACUTE PAIN

## 2024-12-17 ASSESSMENT — PAIN DESCRIPTION - LOCATION
LOCATION: KNEE
LOCATION: KNEE

## 2024-12-17 ASSESSMENT — COLUMBIA-SUICIDE SEVERITY RATING SCALE - C-SSRS
1. IN THE PAST MONTH, HAVE YOU WISHED YOU WERE DEAD OR WISHED YOU COULD GO TO SLEEP AND NOT WAKE UP?: NO
6. HAVE YOU EVER DONE ANYTHING, STARTED TO DO ANYTHING, OR PREPARED TO DO ANYTHING TO END YOUR LIFE?: NO
2. HAVE YOU ACTUALLY HAD ANY THOUGHTS OF KILLING YOURSELF?: NO

## 2024-12-17 NOTE — ED TRIAGE NOTES
Pt arrives via wheelchair to [facility] presenting with left knee pain and swelling. Pt denies any recent injury, states the tenderness began suddenly. Pt denies any numbness or tingling, sensation intact. Pulses intact. Pt denies any CP, SOB, N/V/D, fever and/or chills. Pt A&Ox3, reps easy and unlabored, skin of appropriate color.

## 2024-12-17 NOTE — ED PROVIDER NOTES
HPI   Chief Complaint   Patient presents with    Knee Pain       HPI    Very pleasant 84-year-old female presents to the ER with chief complaint of left knee pain according to the patient she reports that the pain pains started going past couple days.     Patient History   Past Medical History:   Diagnosis Date    Aneurysm of the ascending aorta, without rupture (CMS-[facility]) 01/28/2020    Ascending aortic aneurysm    Atherosclerotic heart disease of native coronary artery with other forms of angina pectoris 11/15/2019    Coronary artery disease with other form of angina pectoris    Gross hematuria 09/08/2023    H/O ascending aortic replacement 02/01/2023    Heart failure, unspecified 08/26/2019    CHF exacerbation    History of GI bleed 09/08/2023    History of total hip replacement 09/08/2023    Hypertension     Nonrheumatic aortic (valve) insufficiency 06/23/2022    Severe aortic regurgitation    S/P cardiac pacemaker procedure 02/01/2023    S/P coronary artery stent placement 02/01/2023    Severe aortic regurgitation 02/01/2023    Sinus node dysfunction (Multi) 02/01/2023    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    Status post right hip replacement 09/08/2023    Tibia fracture 02/01/2023     Past Surgical History:   Procedure Laterality Date    BLADDER SURGERY  04/06/2017    Bladder Surgery    CARDIAC PACEMAKER PLACEMENT  04/06/2017    Pacemaker Placement    HYSTERECTOMY  04/06/2017    Hysterectomy    OTHER SURGICAL HISTORY  09/21/2017    Total Hip Replacement Left    OTHER SURGICAL HISTORY  04/06/2017    Previous Stent Placement    OTHER SURGICAL HISTORY  03/13/2019    Aortic valve replacement     Family History   Problem Relation Name Age of Onset    Diabetes Mother      Hypertension Brother      Other (Cardiac Disorder) Brother      Liver cancer Brother      Diabetes Mother's Sister      Diabetes Mother's Brother       Social History      Tobacco Use    Smoking status: Never    Smokeless tobacco: Never   Substance Use Topics    Alcohol use: Never    Drug use: Never       Physical Exam   ED Triage Vitals [12/17/24 1648]   Temperature Heart Rate Respirations BP   36.7 °C (98 °F) 66 18 111/57      Pulse Ox Temp Source Heart Rate Source Patient Position   100 % Temporal Monitor --      BP Location FiO2 (%)     -- --       Physical Exam  Constitutional:       General: She is not in acute distress.     Appearance: Normal appearance. She is not toxic-appearing.   HENT:      Head: Normocephalic and atraumatic.      Right Ear: Tympanic membrane normal.      Left Ear: Tympanic membrane normal.      Mouth/Throat:      Mouth: Mucous membranes are moist.      Pharynx: Oropharynx is clear.   Eyes:      Conjunctiva/sclera: Conjunctivae normal.      Pupils: Pupils are equal, round, and reactive to light.   Cardiovascular:      Rate and Rhythm: Normal rate and regular rhythm.      Pulses: Normal pulses.      Heart sounds: Normal heart sounds.   Pulmonary:      Effort: Pulmonary effort is normal. No respiratory distress.      Breath sounds: Normal breath sounds. No wheezing.   Abdominal:      General: Bowel sounds are normal.      Palpations: Abdomen is soft.      Tenderness: There is no abdominal tenderness. There is no guarding or rebound.   Musculoskeletal:         General: Normal range of motion.      Cervical back: Normal range of motion.        Legs:    Skin:     General: Skin is warm and dry.   Neurological:      General: No focal deficit present.      Mental Status: She is alert and oriented to person, place, and time.           ED Course & MDM   Diagnoses as of 12/17/24 1839   Acute pain of left knee                 No data recorded     Coleman Coma Scale Score: 15 (12/17/24 1654 : Tyrone Alvarado RN)                           Medical Decision Making  Very pleasant 84-year-old female presents to the ER with chief complaint of left knee pain according  to the patient she reports that the pain pains started going past couple days.  She denies any injury or trauma.  Patient has a large effusion on exam.  Did offer her drainage of her knee.  Patient agreed to it.  I talked her risks and benefits of doing it including infection causing more harm or incomplete drainage.  Patient was understanding of the risks versus benefits that she proceeded with that this was witnessed by her son at the bedside.  The local injection and was unable to drain the knee was able to get a bloody dark blood.  Concerned that this may be a hemarthrosis.  Plan will place an Ace wrap and knee immobilizer.  Will defer the patient to follow-up with orthopedics tomorrow.        Procedure  Arthrocentesis    Performed by: Luis Cordero DO  Authorized by: Luis Cordero DO    Consent:     Consent obtained:  Verbal    Consent given by:  Patient    Risks discussed:  Bleeding, infection, pain and incomplete drainage  Location:     Location:  Knee    Knee:  L knee  Anesthesia:     Anesthesia method:  Local infiltration    Local anesthetic:  Lidocaine 2% WITH epi  Procedure details:     Preparation: Patient was prepped and draped in usual sterile fashion      Needle gauge:  18 G    Ultrasound guidance: no      Approach:  Lateral    Aspirate characteristics:  Bloody    Steroid injected: no      Specimen collected: no    Post-procedure details:     Dressing:  Adhesive bandage    Procedure completion:  Tolerated well, no immediate complications  Comments:      Site was prepped with ChloraPrep prior to the drape and then repeat ChloraPrep to after the drape.       Luis Cordero DO  12/17/24 6059

## 2024-12-20 ENCOUNTER — OFFICE VISIT (OUTPATIENT)
Dept: ORTHOPEDIC SURGERY | Facility: CLINIC | Age: 84
End: 2024-12-20
Payer: MEDICARE

## 2024-12-20 DIAGNOSIS — M25.462 PAIN AND SWELLING OF LEFT KNEE: Primary | ICD-10-CM

## 2024-12-20 DIAGNOSIS — M25.562 PAIN AND SWELLING OF LEFT KNEE: Primary | ICD-10-CM

## 2024-12-20 PROCEDURE — 1157F ADVNC CARE PLAN IN RCRD: CPT

## 2024-12-20 PROCEDURE — 1036F TOBACCO NON-USER: CPT

## 2024-12-20 PROCEDURE — 1160F RVW MEDS BY RX/DR IN RCRD: CPT

## 2024-12-20 PROCEDURE — 1125F AMNT PAIN NOTED PAIN PRSNT: CPT

## 2024-12-20 PROCEDURE — 1159F MED LIST DOCD IN RCRD: CPT

## 2024-12-20 PROCEDURE — 99213 OFFICE O/P EST LOW 20 MIN: CPT

## 2024-12-20 ASSESSMENT — PAIN SCALES - GENERAL: PAINLEVEL_OUTOF10: 2

## 2024-12-20 ASSESSMENT — PAIN - FUNCTIONAL ASSESSMENT: PAIN_FUNCTIONAL_ASSESSMENT: 0-10

## 2024-12-20 NOTE — PROGRESS NOTES
Patient woke up earlier this week with a very swollen and paniful left knee. That night she went in the the ED and got her left knee drained that was msotly  lospencer This is a consultation from Dr. Steven Hdz MD for   Chief Complaint   Patient presents with    Left Knee - Pain     Fluid build up, tried to drain but only blood had since Tuesday        This is a 84 y.o. female who presents for follow-up of her left knee.  Patient states that she woke up on Tuesday and her left knee was randomly very swollen very painful and went into the emergency room where they drained it and there was mostly blood.  Over the past few days swelling is gone down progressively and the pain is much more tolerable.  Patient states she is on a blood thinner and is only able to take Tylenol.  Patient states she does not have pain when she walks and notices that there is still fluid in there.  Patient is unsure what the cause was and would like to know if there is anything else needs to be done.    Physical Exam    There has been no interval change in this patient's past medical, surgical, medications, allergies, family history or social history since the most recent visit to a provider within our department. 14 point review of systems was performed, reviewed, and negative except for pertinent positives documented in the history of present illness.     Constitutional: well developed, well nourished female in no acute distress  Psychiatric: normal mood, appropriate affect  Eyes: sclera anicteric  HENT: normocephalic/atraumatic  CV: regular rate and rhythm   Respiratory: non labored breathing  Integumentary: no rash  Neurological: moves all extremities    Left knee exam: skin intact no lacerations or abrations.  There is a puncture site where they performed the aspiration in the emergency department that is healing well.  Mild effusion still present. nttp joint line. negative log roll negative patellar grind. ROM  with  "hesitancy. stable to varus and valgus stress at 0 and 30 degrees. negative lachman negative posterior drawer negative sanket. 5/5 ehl/fhl/gs/ta. silt s/s/sp/dp/t. 2+ dp/pt      Xrays were ordered by me, they were reviewed and independently interpreted by me today, they show left knee with mild joint space degeneration no presence of acute fracture or dislocation.  There appears to be some calcium buildup in the posterior knee.    Procedures      Impression/Plan: This is a 84 y.o. female following up for her left knee.  I told the patient since her swelling and pain has been progressively decreasing over the past few days I do not want to try and drain her knee again.  Told the patient that she can continue with resting her leg while keeping it elevated, taking Tylenol for the pain as needed.  Told her to keep an eye on it and to make sure that it continues to get better, but if it gets worse over the weekend or she develops full body fevers or pain beyond proportion that she should come back in for [place].    BMI Readings from Last 1 Encounters:   12/17/24 24.03 kg/m²      Lab Results   Component Value Date    CREATININE 1.50 (H) 09/30/2024     Tobacco Use: Low Risk  (12/20/2024)    Patient History     Smoking Tobacco Use: Never     Smokeless Tobacco Use: Never     Passive Exposure: Not on file      Computed MELD 3.0 unavailable. One or more values for this score either were not found within the given timeframe or did not fit some other criterion.  Computed MELD-Na unavailable. One or more values for this score either were not found within the given timeframe or did not fit some other criterion.       Lab Results   Component Value Date    HGBA1C 5.0 11/03/2022     No results found for: \"STAPHMRSASCR\"  "

## 2024-12-26 DIAGNOSIS — E03.9 HYPOTHYROIDISM, UNSPECIFIED TYPE: ICD-10-CM

## 2024-12-26 RX ORDER — LEVOTHYROXINE SODIUM 88 UG/1
TABLET ORAL
Qty: 90 TABLET | Refills: 0 | Status: SHIPPED | OUTPATIENT
Start: 2024-12-26

## 2024-12-27 ENCOUNTER — OFFICE VISIT (OUTPATIENT)
Dept: ORTHOPEDIC SURGERY | Facility: CLINIC | Age: 84
End: 2024-12-27
Payer: MEDICARE

## 2024-12-27 DIAGNOSIS — M25.462 PAIN AND SWELLING OF LEFT KNEE: Primary | ICD-10-CM

## 2024-12-27 DIAGNOSIS — M25.562 PAIN AND SWELLING OF LEFT KNEE: Primary | ICD-10-CM

## 2024-12-27 PROCEDURE — 1157F ADVNC CARE PLAN IN RCRD: CPT | Performed by: ORTHOPAEDIC SURGERY

## 2024-12-27 PROCEDURE — 20610 DRAIN/INJ JOINT/BURSA W/O US: CPT | Performed by: ORTHOPAEDIC SURGERY

## 2024-12-27 PROCEDURE — 1036F TOBACCO NON-USER: CPT | Performed by: ORTHOPAEDIC SURGERY

## 2024-12-27 PROCEDURE — 1159F MED LIST DOCD IN RCRD: CPT | Performed by: ORTHOPAEDIC SURGERY

## 2024-12-27 PROCEDURE — 1125F AMNT PAIN NOTED PAIN PRSNT: CPT | Performed by: ORTHOPAEDIC SURGERY

## 2024-12-27 PROCEDURE — 99214 OFFICE O/P EST MOD 30 MIN: CPT | Performed by: ORTHOPAEDIC SURGERY

## 2024-12-27 RX ORDER — TRIAMCINOLONE ACETONIDE 40 MG/ML
1 INJECTION, SUSPENSION INTRA-ARTICULAR; INTRAMUSCULAR
Status: COMPLETED | OUTPATIENT
Start: 2024-12-27 | End: 2024-12-27

## 2024-12-27 ASSESSMENT — PAIN - FUNCTIONAL ASSESSMENT: PAIN_FUNCTIONAL_ASSESSMENT: 0-10

## 2024-12-27 ASSESSMENT — PAIN SCALES - GENERAL: PAINLEVEL_OUTOF10: 6

## 2024-12-27 NOTE — LETTER
December 27, 2024     Steven Hdz MD  [address] [place] [address]    Patient: Shaina Iverson   YOB: 1940   Date of Visit: 12/27/2024       Dear Dr. Steven Hdz MD:    Thank you for referring Shaina Iverson to me for evaluation. Below are my notes for this consultation.  If you have questions, please do not hesitate to call me. I look forward to following your patient along with you.       Sincerely,     Clay Toney MD      CC: No Recipients  ______________________________________________________________________________________    This is a consultation from Dr. Steven Hdz MD for   Chief Complaint   Patient presents with   • Left Knee - Edema, Pain       This is a 84 y.o. female who presents for evaluation of left knee pain.  Patient states has had left knee pain for a long time on and off, this is a chronic issue but it got much worse last week and she had a lot of pain over the front of her knee.  She went to the ER and they attempted to aspirate it but did not get anything out.  No numbness or tingling no fevers or chills no shooting pain down the leg.  She is mainly pain over the anterior knee especially associated with swelling.  She is never had surgery on the knee.  She has taken over-the-counter anti-inflammatories.    Physical Exam    There has been no interval change in this patient's past medical, surgical, medications, allergies, family history or social history since the most recent visit to a provider within our department. 14 point review of systems was performed, reviewed, and negative except for pertinent positives documented in the history of present illness.     Constitutional: well developed, well nourished female in no acute distress  Psychiatric: normal mood, appropriate affect  Eyes: sclera anicteric  HENT: normocephalic/atraumatic  CV: regular rate and rhythm   Respiratory: non labored breathing  Integumentary: no  rash  Neurological: moves all extremities    Left knee exam: skin intact no lacerations or abrations.  2+ effusion.  Tender medial and lateral joint line. negative log roll negative patellar grind. ROM 0-120. stable to varus and valgus stress at 0 and 30 degrees. negative lachman negative posterior drawer negative sanket. 5/5 ehl/fhl/gs/ta. silt s/s/sp/dp/t. 2+ dp/pt        Xrays were ordered by me, they were reviewed and independently interpreted by me today, they show moderate degenerative changes left knee    L Inj/Asp: L knee on 12/27/2024 12:35 PM  Indications: pain and joint swelling  Details: 22 G needle, anterolateral approach  Medications: 1 mL triamcinolone acetonide 40 mg/mL  Aspirate: 20 mL serous and bloody    Discussion:  I discussed the conservative treatment options for knee osteoarthritis including but not limited to physical therapy, oral NSAIDS, activity and lifestyle modification, and corticosteroid injections. Pt has elected to undergo a cortisone injection today. I have explained the risk and benefits of an injection including the possibility of joint infection, bleeding, damage to cartilage, allergic reaction. Patient verbalized understanding and gave verbal consent wishes to proceed with a intra-articular cortisone injection for their knee.    Procedure:  After discussing the risk and benefits of the procedure, we proceeded with an intra-articular left knee injection. We discussed the risks and benefits and potential morbidity related to the treatment, and to the prescription medication administered in the injection    With the patient's informed verbal consent, the left knee was prepped in standard sterile fashion with Chlorhexidine. The skin was then anesthetized with ethyl chloride spray and cleaned again with Chlorhexidine. The knee was then apirated/injected with a prefilled 20-gauge syringe of 40 mg Kenalog + 4 ml Lidocaine using the lateral approach without complications.  The  patient tolerated this well and felt immediate initial relief of symptoms. A bandaid was applied and the patient ambulated out of the clinic on ther own accord without difficulty. Patient was instructed to avoid physical activity for 24-48 hours to prevent the knees from swelling and may ice the knees as tolerated. Patient should contact the office if any signs of of infection appear: redness, fever, chills, drainage, swelling or warmth to the knees.  Pt understands that the injections can be repeated no sooner than 3 months.  Procedure, treatment alternatives, risks and benefits explained, specific risks discussed. Consent was given by the patient. Immediately prior to procedure a time out was called to verify the correct patient, procedure, equipment, support staff and site/side marked as required. Patient was prepped and draped in the usual sterile fashion.         Impression/Plan: This is a 84 y.o. female with exacerbation of left knee arthritis.  I had an in depth discussion with the patient regarding treatment options for arthritis and their relative risks and benefits. We reviewed surgical and nonsurgical option for treatment. Treatments include anti inflammatory medications, physical therapy, weight loss, activity modification, use of assistive devices, injection therapies. We discussed current prescriptions and risks and benefits of continuation of prescription medication as apporpriate. We discussed that arthritis is often progressive over time, an in end stage arthritis surgical interventions can be considered, including arthroplasty. All questions were answered and the patient voiced their understanding.  Get a set up with injection aspiration today, I will see her back as needed    BMI Readings from Last 1 Encounters:   12/17/24 24.03 kg/m²      Lab Results   Component Value Date    CREATININE 1.50 (H) 09/30/2024     Tobacco Use: Low Risk  (12/27/2024)    Patient History    • Smoking Tobacco Use: Never  "   • Smokeless Tobacco Use: Never    • Passive Exposure: Not on file      Computed MELD 3.0 unavailable. One or more values for this score either were not found within the given timeframe or did not fit some other criterion.  Computed MELD-Na unavailable. One or more values for this score either were not found within the given timeframe or did not fit some other criterion.       Lab Results   Component Value Date    HGBA1C 5.0 11/03/2022     No results found for: \"STAPHMRSASCR\"  "

## 2024-12-27 NOTE — PROGRESS NOTES
This is a consultation from Dr. Steven Hdz MD for   Chief Complaint   Patient presents with   • Left Knee - Edema, Pain       This is a 84 y.o. female who presents for evaluation of left knee pain.  Patient states has had left knee pain for a long time on and off, this is a chronic issue but it got much worse last week and she had a lot of pain over the front of her knee.  She went to the ER and they attempted to aspirate it but did not get anything out.  No numbness or tingling no fevers or chills no shooting pain down the leg.  She is mainly pain over the anterior knee especially associated with swelling.  She is never had surgery on the knee.  She has taken over-the-counter anti-inflammatories.    Physical Exam    There has been no interval change in this patient's past medical, surgical, medications, allergies, family history or social history since the most recent visit to a provider within our department. 14 point review of systems was performed, reviewed, and negative except for pertinent positives documented in the history of present illness.     Constitutional: well developed, well nourished female in no acute distress  Psychiatric: normal mood, appropriate affect  Eyes: sclera anicteric  HENT: normocephalic/atraumatic  CV: regular rate and rhythm   Respiratory: non labored breathing  Integumentary: no rash  Neurological: moves all extremities    Left knee exam: skin intact no lacerations or abrations.  2+ effusion.  Tender medial and lateral joint line. negative log roll negative patellar grind. ROM 0-120. stable to varus and valgus stress at 0 and 30 degrees. negative lachman negative posterior drawer negative sanket. 5/5 ehl/fhl/gs/ta. silt s/s/sp/dp/t. 2+ dp/pt        Xrays were ordered by me, they were reviewed and independently interpreted by me today, they show moderate degenerative changes left knee    L Inj/Asp: L knee on 12/27/2024 12:35 PM  Indications: pain and joint  swelling  Details: 22 G needle, anterolateral approach  Medications: 1 mL triamcinolone acetonide 40 mg/mL  Aspirate: 20 mL serous and bloody    Discussion:  I discussed the conservative treatment options for knee osteoarthritis including but not limited to physical therapy, oral NSAIDS, activity and lifestyle modification, and corticosteroid injections. Pt has elected to undergo a cortisone injection today. I have explained the risk and benefits of an injection including the possibility of joint infection, bleeding, damage to cartilage, allergic reaction. Patient verbalized understanding and gave verbal consent wishes to proceed with a intra-articular cortisone injection for their knee.    Procedure:  After discussing the risk and benefits of the procedure, we proceeded with an intra-articular left knee injection. We discussed the risks and benefits and potential morbidity related to the treatment, and to the prescription medication administered in the injection    With the patient's informed verbal consent, the left knee was prepped in standard sterile fashion with Chlorhexidine. The skin was then anesthetized with ethyl chloride spray and cleaned again with Chlorhexidine. The knee was then apirated/injected with a prefilled 20-gauge syringe of 40 mg Kenalog + 4 ml Lidocaine using the lateral approach without complications.  The patient tolerated this well and felt immediate initial relief of symptoms. A bandaid was applied and the patient ambulated out of the clinic on ther own accord without difficulty. Patient was instructed to avoid physical activity for 24-48 hours to prevent the knees from swelling and may ice the knees as tolerated. Patient should contact the office if any signs of of infection appear: redness, fever, chills, drainage, swelling or warmth to the knees.  Pt understands that the injections can be repeated no sooner than 3 months.  Procedure, treatment alternatives, risks and benefits  "explained, specific risks discussed. Consent was given by the patient. Immediately prior to procedure a time out was called to verify the correct patient, procedure, equipment, support staff and site/side marked as required. Patient was prepped and draped in the usual sterile fashion.         Impression/Plan: This is a 84 y.o. female with exacerbation of left knee arthritis.  I had an in depth discussion with the patient regarding treatment options for arthritis and their relative risks and benefits. We reviewed surgical and nonsurgical option for treatment. Treatments include anti inflammatory medications, physical therapy, weight loss, activity modification, use of assistive devices, injection therapies. We discussed current prescriptions and risks and benefits of continuation of prescription medication as apporpriate. We discussed that arthritis is often progressive over time, an in end stage arthritis surgical interventions can be considered, including arthroplasty. All questions were answered and the patient voiced their understanding.  Get a set up with injection aspiration today, I will see her back as needed    BMI Readings from Last 1 Encounters:   12/17/24 24.03 kg/m²      Lab Results   Component Value Date    CREATININE 1.50 (H) 09/30/2024     Tobacco Use: Low Risk  (12/27/2024)    Patient History    • Smoking Tobacco Use: Never    • Smokeless Tobacco Use: Never    • Passive Exposure: Not on file      Computed MELD 3.0 unavailable. One or more values for this score either were not found within the given timeframe or did not fit some other criterion.  Computed MELD-Na unavailable. One or more values for this score either were not found within the given timeframe or did not fit some other criterion.       Lab Results   Component Value Date    HGBA1C 5.0 11/03/2022     No results found for: \"STAPHMRSASCR\"  "

## 2025-01-15 DIAGNOSIS — I10 ESSENTIAL HYPERTENSION: ICD-10-CM

## 2025-01-15 RX ORDER — VALSARTAN 40 MG/1
40 TABLET ORAL DAILY
Qty: 90 TABLET | Refills: 0 | Status: SHIPPED | OUTPATIENT
Start: 2025-01-15

## 2025-02-12 DIAGNOSIS — D50.0 IRON DEFICIENCY ANEMIA DUE TO CHRONIC BLOOD LOSS: ICD-10-CM

## 2025-02-12 DIAGNOSIS — I48.91 ATRIAL FIBRILLATION, UNSPECIFIED TYPE (MULTI): ICD-10-CM

## 2025-02-12 RX ORDER — APIXABAN 2.5 MG/1
TABLET, FILM COATED ORAL
Qty: 60 TABLET | Refills: 3 | Status: SHIPPED | OUTPATIENT
Start: 2025-02-12

## 2025-02-12 RX ORDER — FERROUS SULFATE 325(65) MG
1 TABLET ORAL 2 TIMES DAILY
Qty: 180 TABLET | Refills: 1 | Status: SHIPPED | OUTPATIENT
Start: 2025-02-12

## 2025-03-07 DIAGNOSIS — N18.32 STAGE 3B CHRONIC KIDNEY DISEASE (MULTI): ICD-10-CM

## 2025-03-07 DIAGNOSIS — I10 BENIGN ESSENTIAL HTN: ICD-10-CM

## 2025-03-07 DIAGNOSIS — I50.20 SYSTOLIC HEART FAILURE, UNSPECIFIED HF CHRONICITY: ICD-10-CM

## 2025-03-09 DIAGNOSIS — I10 BENIGN ESSENTIAL HTN: ICD-10-CM

## 2025-03-10 ENCOUNTER — APPOINTMENT (OUTPATIENT)
Dept: NEPHROLOGY | Facility: CLINIC | Age: 85
End: 2025-03-10
Payer: MEDICARE

## 2025-03-10 VITALS
WEIGHT: 142 LBS | DIASTOLIC BLOOD PRESSURE: 84 MMHG | HEIGHT: 64 IN | HEART RATE: 82 BPM | OXYGEN SATURATION: 94 % | SYSTOLIC BLOOD PRESSURE: 132 MMHG | TEMPERATURE: 97.5 F | BODY MASS INDEX: 24.24 KG/M2

## 2025-03-10 DIAGNOSIS — N18.32 STAGE 3B CHRONIC KIDNEY DISEASE (MULTI): Primary | ICD-10-CM

## 2025-03-10 DIAGNOSIS — I10 BENIGN ESSENTIAL HTN: ICD-10-CM

## 2025-03-10 DIAGNOSIS — I50.20 SYSTOLIC HEART FAILURE, UNSPECIFIED HF CHRONICITY: ICD-10-CM

## 2025-03-10 PROCEDURE — 3075F SYST BP GE 130 - 139MM HG: CPT | Performed by: INTERNAL MEDICINE

## 2025-03-10 PROCEDURE — 1159F MED LIST DOCD IN RCRD: CPT | Performed by: INTERNAL MEDICINE

## 2025-03-10 PROCEDURE — 99215 OFFICE O/P EST HI 40 MIN: CPT | Performed by: INTERNAL MEDICINE

## 2025-03-10 PROCEDURE — 1157F ADVNC CARE PLAN IN RCRD: CPT | Performed by: INTERNAL MEDICINE

## 2025-03-10 PROCEDURE — G2211 COMPLEX E/M VISIT ADD ON: HCPCS | Performed by: INTERNAL MEDICINE

## 2025-03-10 PROCEDURE — 3078F DIAST BP <80 MM HG: CPT | Performed by: INTERNAL MEDICINE

## 2025-03-10 RX ORDER — METOPROLOL SUCCINATE 25 MG/1
25 TABLET, EXTENDED RELEASE ORAL NIGHTLY
Qty: 90 TABLET | Refills: 0 | OUTPATIENT
Start: 2025-03-10

## 2025-03-10 NOTE — PROGRESS NOTES
Subjective       Ms Iverson is a 83-year-old female with past medical history of CKD stage III, anemia, A. fib, congestive heart failure, hypertension, coronary artery disease, GI bleed on PPI, hypomagnesia and hypokalemia, asthma, recurrent UTI, aortic valve stenosis status post replacement who is coming to see me today for CKD follow-up.    Patient was last seen in office in August 2024. Since that visit lab work showed a decreased GFR from 38 to 27. Spironolactone was decreased to 12.5mg daily. Uric acid was elevated at 8.1 and Allopurinol was increased to 200mg daily. Her latest labs were from 09/30/24 showed an improvement in GFR from 27 to 35. There is currently no recent lab work done due to lab issues. Pt. was seen today in office with her son. Pt. is getting lab work done today after visit. Discussed that will continue same regimen unless lab work indicates otherwise. Has not seen her cardiologist in a while. Advised pt. to schedule an appt with cardiology and ask for the indication of Plavix. Will follow up in 6 months.     Prior Note    09/16/24  Last office visit November 2023.  Shaina came today with her son/Javi.  No kidney recent complaints or concerns.  She did not get blood work done prior to the visit.  Blood pressures accepted.  Good adherence to medications.  Will get blood work today and follow-up with results    Per prior notes prior notes    Last visit April 2023. Today pt is here with her son Javi. She was in the hospital 10/2023 for back pain. She was found to be c diff positive, she states she did not take PO abx for this. Saw her PCP after. Has intermittent diarrhea. Reviewed CT imaging which showed normal appearing kidneys.   Her BP is good this visit. Denies checking it at home, though has machine. Continues to not take Farxiga. Takes torsemide 2 pills daily and 4 pills on Thursdays. Denies dysuria,  symptoms.       Per prior note     Last office visit December 2022. At that time we  started allopurinol 100 mg po qd for elevated uric acid. Since then, she reports doing well. She recently underwent left total hip arthroplasty and otherwise has no complaints or concerns. Today, she is accompanied by her son. /68 today. She has not had interval blood work, but will obtain today. We will call and update her with results and any changes to management. She has continued off of Farxiga 2/2 past DAVID and hypotension. She continues on iron supplementation for iron deficiency without anemia. She continues on magnesium supplements. She has not had significant leg swelling, shortness of breath or weight gain. She watches the salt in her diet.      Per prior notes     Last office visit November 2022. She came initially to see me for worsening kidney function. We had some concerns about hypertension over medications. We adjusted her blood pressure. In the interim, she feels well. She came today with her son. No complaints or concerns. Blood pressure is excellent. She had recent blood work done and all results were discussed with them in details including improved serum creatinine 1.3 and GFR back to baseline 40. With normal electrolytes. She has no anemia. Blood work showed iron deficiency. She continues to be on iron supplement. She continues to be on magnesium supplements. She still has significant hip pain with plans for possible hip surgery. She is a wheelchair-bound at this time. No leg swelling or shortness of breath. No significant weight gain since last office visit. She watches salt in diet. No history of gout     Per prior notes     Shaina came today with her son. She reports frequent UTIs. She had frequent rounds of antibiotic recently. She is not aware of history of chronic kidney disease. She does not check blood pressure at home. She denies recent history of NSAID use however she had remote history of ibuprofen intake. She suffers from chronic hip pain and she was hoping for hip surgery  "clearance this coming week. She has A. fib and she is undergoing pacemaker replacement later this week. She reports good urine output. No lower urine tract symptoms. No leg swelling or shortness of breath. She does not follow specific diet. She never had kidney stones in the past. She suffered from GI bleed for which she is on chronic PPI. She denies blood or foam in the urine.     Family history: Mom had multiple kidney issues-unknown exact disease. No one in the family is on dialysis  Social history: Non-smoker, no drugs  Surgical history: Irrelevant      Review of Systems     Constitutional: no fever,~no chills,~no recent weight gain~and~no recent weight loss.   Eyes: eyesight problems, but~no blurred vision~and~no diplopia.   ENT: no nasal discharge, ~no hearing loss,~no earache,~no sore throat~and~no swollen glands in the neck.   Cardiovascular: no chest pain,~no palpitations~and~no lower extremity edema.   Respiratory: no shortness of breath,~no chronic cough~and~no shortness of breath during exertion.   Gastrointestinal: diarrhea, no abdominal pain,~no constipation,~no heartburn,~no vomiting,~no bloody stools  Genitourinary: no dysuria~and~no hematuria.   Musculoskeletal: no arthralgias~and~difficulty walking, but~no myalgias.   Skin: no rashes~and~no skin lesions.   Neurological: no headaches~and~no dizziness.   Psychiatric: no confusion,~no depression~and~no anxiety.   Endocrine: no heat intolerance,~no cold intolerance,~appetite not increased,~no thyroid disorder,~no increased urinary frequency~and~no dry skin.   Hematologic/Lymphatic: does not bleed easily~and~does not bruise easily.   All other systems have been reviewed and are negative for complaint.        Objective   /84 (BP Location: Left arm, Patient Position: Standing, BP Cuff Size: Adult)   Pulse 82   Temp 36.4 °C (97.5 °F)   Ht 1.626 m (5' 4\")   Wt 64.4 kg (142 lb)   SpO2 94%   BMI 24.37 kg/m²   Wt Readings from Last 3 Encounters: " "  03/10/25 64.4 kg (142 lb)   12/17/24 63.5 kg (140 lb)   09/19/24 64.3 kg (141 lb 12.8 oz)       Physical Exam    General appearance: no distress awake and alert on room air, euvolemic on exam  Eyes: non-icteric  HEENT: atrumatic head, PEERLA, moist mucosa  Skin: no apparent rash  Heart: NSR, S1, S2 normal, no murmur or gallop  Lungs: Symmetrical expansion,CTA bilat no wheezing/crackles  Abdomen: soft, nt/nd, obese  Extremities: Trace non-pitting edema bilaterally R >L.  Neuro: No FND,asterixis, no focal deficits noticed        Data Review                     No lab exists for component: \"LABALBU\"    Lab Results   Component Value Date    URICACID 8.1 (H) 09/16/2024       No components found for: \"[id number]\"      Lab Results   Component Value Date    HGBA1C 5.0 11/03/2022         Lab Results   Component Value Date    CALCIUM 9.9 09/30/2024    PHOS 3.7 09/30/2024         No results found for requested labs within last 365 days.    Recent Labs     09/30/24  1620 09/16/24  1524 10/11/23  0435 10/10/23  0454 10/09/23  0517 10/08/23  2009 10/04/23  1546 06/14/23  1519 04/06/23  1510 01/24/23  0120 01/23/23  0452 01/22/23  0449    139 135* 134* 133* 135* 135*   < > 137 138 139 138   K 4.4 4.5 4.1 4.2 3.9 4.2 4.0   < > 4.6 3.9 3.7 3.9   CO2 25 29 23 23 26 27 25   < > 26 27 24 23*   BUN 33* 33* 42* 40* 37* 42* 35*   < > 34* 27* 29* 39*   GLUCOSE 77 78 94 124* 96 96 88   < > 72* 93 123* 94   CALCIUM 9.9 9.3 8.9 9.0 8.8 9.6 8.9   < > 9.7 9.1 9.3 9.6   PHOS 3.7 3.7  --   --   --   --  3.5  --  3.6 1.7* 1.7* 2.0*   CREATININE 1.50* 1.85* 1.40* 1.60* 1.66* 1.95* 1.61*   < > 1.26* 1.0 1.0 1.2   EGFR 35* 27* 38* 32* 31* 26* 32*  --   --  57 57 46   ANIONGAP 16 15 13 16 10 15 14   < > 16 11 13 16    < > = values in this interval not displayed.         Assessment and Plan     Ms Iverson is a 82-year-old female with past medical history of CKD stage III, anemia, A. fib, congestive heart failure, hypertension, coronary " artery disease, GI bleed on PPI, hypomagnesia and hypokalemia, asthma, recurrent UTI, aortic valve stenosis status post replacement who is coming to see me today for CKD follow-up.     Impression  #Chronic kidney disease stage III  - Baseline serum creatinine 1.1-1.3, baseline GFR 40-50 mill per minute per 1.73 mÂ² up until October 2022. Possibly atherosclerotic cardiovascular disease and cardiorenal syndrome type II.   - Kidney ultrasound done in November 2022 was reviewed and showed shrunk kidney bilateral consistent with history of chronic kidney disease--albeit, right kidney 7.6 cm, left kidney 8.4 cm with cyst 1.7 cm x 1.7 cm. No obstruction.   - In October 2023, serum creatinine is 1.4 and GFR 38 mils per minute per 1.73 mÂ² and has been stable  - Lab work from 09/16/24 showed a decreased GFR from 38 to 27. Spironolactone was decreased to 12.5mg daily and Allopurinol was increased to 200mg daily. Her latest labs were from 09/30/24 which showed an improvement in GFR from 27 to 35. No recent lab work since then   - Will continue current regimen unless otherwise indicated by lab wok done today  - Pending lab work done today      #Worsened renal function prior to Dec 2022 visit, improved   - Per prior note: Started October 2022 serum creatinine has been worsening last 2.5 and GFR 18 mill per minute per 1.73 mÂ² in November 2022. Differential diagnoses included acute interstitial nephritis in the setting of multiple antibiotic rounds. Urine earlier showed large leukocyte esterase, +1 protein, moderate blood. Prior urine culture recently is negative. She is asymptomatic with no pain or burning with urination. She has been on PPI for a long time for melena - I am hesitant to hold at this time. Since Dec 2022, kidney function improved to baseline. I will followed conservative measures and adjusted blood pressure medication.   - improved kidney function 10/2023. Repeat labs next visit      #Hypertension -  well-controlled  - Blood pressure accepted today   -Lab work 09/16/24  showed a decreased GFR from 38 to 27. Spironolactone was decreased to 12.5mg daily. As long as kidney function remains stable will continue spironolactone. If worsening kidney function will consider discontinuing  - Current medications: Valsartan 40 mg qd, Spironolactone 12.5 daily, Torsemide daily (20 mg x2, thursdays 20 mg x4), metoprolol succinate XL 25mg nightly  - Continue holding Farxiga. In the spite of evidence of benefits from SGL 2 inhibitor intake, recurrent UTI might be limiting use of SGLT2 inhibitors.     #Hyperuricemia   - no history of gout  - Lab work on 09/16/24 showed Uric acid: 8.1. Increased Allopurinol from 100mg qid to 200mg daily    - Current Medications: Allopurinol 200mg daily   - Continue low animal-based protein diet  - Continue to monitor      #CHF/A. fib-  - Current medications: Torsemide daily (20 mg x2, thursdays 20 mg x4), Valsartan 40mg daily, Eliquis 2.5 daily, and Plavix 75mg daily  - Unsure why pt is on Plavix. Discussed with pt to talk to cardiologist   - Follow up with Cardiology    - No changes      #chronic iron deficiency anemia -   - Last lab work on 09/16/24 showed Hemoglobin: 13.8   - Current medications: FeroSul tablet BID   - Pending recent iron studies and CBC from today      #Hypomagnesemia, hypokalemia  - Pending recent labwork     #CKD/MBD  - Pending recent calcium, phosphorus, PTH, and vitamin D labs     Follow-up in 6 months with repeat blood work and urinalysis prior to next visit     Carmen Pat MD, MS, BENNIE YUNG  Clinical  - [facility] [employer]  Nephrologist - [facility]

## 2025-03-11 LAB
25(OH)D3+25(OH)D2 SERPL-MCNC: 29 NG/ML (ref 30–100)
ALBUMIN SERPL-MCNC: 4.4 G/DL (ref 3.6–5.1)
ALBUMIN/CREAT UR: 1279 MG/G CREAT
BUN SERPL-MCNC: 30 MG/DL (ref 7–25)
BUN/CREAT SERPL: 24 (CALC) (ref 6–22)
CALCIUM SERPL-MCNC: 9.7 MG/DL (ref 8.6–10.4)
CHLORIDE SERPL-SCNC: 106 MMOL/L (ref 98–110)
CO2 SERPL-SCNC: 25 MMOL/L (ref 20–32)
CREAT SERPL-MCNC: 1.23 MG/DL (ref 0.6–0.95)
CREAT UR-MCNC: 43 MG/DL (ref 20–275)
EGFRCR SERPLBLD CKD-EPI 2021: 44 ML/MIN/1.73M2
ERYTHROCYTE [DISTWIDTH] IN BLOOD BY AUTOMATED COUNT: 14 % (ref 11–15)
FERRITIN SERPL-MCNC: 95 NG/ML (ref 16–288)
GLUCOSE SERPL-MCNC: 87 MG/DL (ref 65–99)
HCT VFR BLD AUTO: 41.2 % (ref 35–45)
HGB BLD-MCNC: 13.7 G/DL (ref 11.7–15.5)
IRON SATN MFR SERPL: 34 % (CALC) (ref 16–45)
IRON SERPL-MCNC: 99 MCG/DL (ref 45–160)
MCH RBC QN AUTO: 31.8 PG (ref 27–33)
MCHC RBC AUTO-ENTMCNC: 33.3 G/DL (ref 32–36)
MCV RBC AUTO: 95.6 FL (ref 80–100)
MICROALBUMIN UR-MCNC: 55 MG/DL
PHOSPHATE SERPL-MCNC: 3.2 MG/DL (ref 2.1–4.3)
PLATELET # BLD AUTO: 137 THOUSAND/UL (ref 140–400)
PMV BLD REES-ECKER: 9.5 FL (ref 7.5–12.5)
POTASSIUM SERPL-SCNC: 3.8 MMOL/L (ref 3.5–5.3)
PTH-INTACT SERPL-MCNC: 158 PG/ML (ref 16–77)
RBC # BLD AUTO: 4.31 MILLION/UL (ref 3.8–5.1)
SODIUM SERPL-SCNC: 142 MMOL/L (ref 135–146)
TIBC SERPL-MCNC: 287 MCG/DL (CALC) (ref 250–450)
URATE SERPL-MCNC: 5 MG/DL (ref 2.5–7)
WBC # BLD AUTO: 5.6 THOUSAND/UL (ref 3.8–10.8)

## 2025-03-13 DIAGNOSIS — I10 BENIGN ESSENTIAL HTN: ICD-10-CM

## 2025-03-13 RX ORDER — METOPROLOL SUCCINATE 25 MG/1
25 TABLET, EXTENDED RELEASE ORAL NIGHTLY
Qty: 90 TABLET | Refills: 1 | Status: SHIPPED | OUTPATIENT
Start: 2025-03-13

## 2025-03-17 ENCOUNTER — APPOINTMENT (OUTPATIENT)
Dept: NEPHROLOGY | Facility: CLINIC | Age: 85
End: 2025-03-17
Payer: MEDICARE

## 2025-03-17 DIAGNOSIS — I10 ESSENTIAL HYPERTENSION: ICD-10-CM

## 2025-03-17 DIAGNOSIS — I48.19 PERSISTENT ATRIAL FIBRILLATION (MULTI): ICD-10-CM

## 2025-03-18 ENCOUNTER — APPOINTMENT (OUTPATIENT)
Dept: PRIMARY CARE | Facility: CLINIC | Age: 85
End: 2025-03-18
Payer: MEDICARE

## 2025-03-18 VITALS
BODY MASS INDEX: 24.07 KG/M2 | WEIGHT: 141 LBS | HEIGHT: 64 IN | HEART RATE: 78 BPM | TEMPERATURE: 97.5 F | OXYGEN SATURATION: 97 % | SYSTOLIC BLOOD PRESSURE: 112 MMHG | DIASTOLIC BLOOD PRESSURE: 62 MMHG

## 2025-03-18 DIAGNOSIS — I48.19 PERSISTENT ATRIAL FIBRILLATION (MULTI): ICD-10-CM

## 2025-03-18 DIAGNOSIS — N39.0 ACUTE UTI: ICD-10-CM

## 2025-03-18 DIAGNOSIS — Z00.00 MEDICARE ANNUAL WELLNESS VISIT, SUBSEQUENT: Primary | ICD-10-CM

## 2025-03-18 DIAGNOSIS — N18.32 STAGE 3B CHRONIC KIDNEY DISEASE (MULTI): ICD-10-CM

## 2025-03-18 DIAGNOSIS — J45.30 MILD PERSISTENT ASTHMA WITHOUT COMPLICATION (HHS-HCC): ICD-10-CM

## 2025-03-18 DIAGNOSIS — R73.9 BORDERLINE HYPERGLYCEMIA: ICD-10-CM

## 2025-03-18 DIAGNOSIS — J84.9 ILD (INTERSTITIAL LUNG DISEASE) (MULTI): ICD-10-CM

## 2025-03-18 DIAGNOSIS — I50.42 CHRONIC COMBINED SYSTOLIC AND DIASTOLIC HEART FAILURE: ICD-10-CM

## 2025-03-18 PROBLEM — Z79.899 MEDICATION MANAGEMENT: Status: RESOLVED | Noted: 2023-09-08 | Resolved: 2025-03-18

## 2025-03-18 LAB — POC HEMOGLOBIN A1C: 5.4 % (ref 4.2–6.5)

## 2025-03-18 PROCEDURE — 1160F RVW MEDS BY RX/DR IN RCRD: CPT | Performed by: FAMILY MEDICINE

## 2025-03-18 PROCEDURE — G0439 PPPS, SUBSEQ VISIT: HCPCS | Performed by: FAMILY MEDICINE

## 2025-03-18 PROCEDURE — 83036 HEMOGLOBIN GLYCOSYLATED A1C: CPT | Performed by: FAMILY MEDICINE

## 2025-03-18 PROCEDURE — 1036F TOBACCO NON-USER: CPT | Performed by: FAMILY MEDICINE

## 2025-03-18 PROCEDURE — 3078F DIAST BP <80 MM HG: CPT | Performed by: FAMILY MEDICINE

## 2025-03-18 PROCEDURE — 3074F SYST BP LT 130 MM HG: CPT | Performed by: FAMILY MEDICINE

## 2025-03-18 PROCEDURE — 1157F ADVNC CARE PLAN IN RCRD: CPT | Performed by: FAMILY MEDICINE

## 2025-03-18 PROCEDURE — 1170F FXNL STATUS ASSESSED: CPT | Performed by: FAMILY MEDICINE

## 2025-03-18 PROCEDURE — 1159F MED LIST DOCD IN RCRD: CPT | Performed by: FAMILY MEDICINE

## 2025-03-18 RX ORDER — CLOPIDOGREL BISULFATE 75 MG/1
75 TABLET ORAL DAILY
Qty: 90 TABLET | Refills: 1 | Status: SHIPPED | OUTPATIENT
Start: 2025-03-18

## 2025-03-18 RX ORDER — TORSEMIDE 20 MG/1
TABLET ORAL
Qty: 192 TABLET | Refills: 1 | Status: SHIPPED | OUTPATIENT
Start: 2025-03-18

## 2025-03-18 RX ORDER — CEPHALEXIN 500 MG/1
500 CAPSULE ORAL 2 TIMES DAILY
Qty: 14 CAPSULE | Refills: 0 | Status: SHIPPED | OUTPATIENT
Start: 2025-03-18 | End: 2025-03-21 | Stop reason: ALTCHOICE

## 2025-03-18 ASSESSMENT — ENCOUNTER SYMPTOMS
OCCASIONAL FEELINGS OF UNSTEADINESS: 0
DEPRESSION: 0
LOSS OF SENSATION IN FEET: 0

## 2025-03-18 ASSESSMENT — ACTIVITIES OF DAILY LIVING (ADL)
TAKING_MEDICATION: NEEDS ASSISTANCE
BATHING: INDEPENDENT
DRESSING: INDEPENDENT
MANAGING_FINANCES: TOTAL CARE
GROCERY_SHOPPING: TOTAL CARE
DOING_HOUSEWORK: TOTAL CARE

## 2025-03-18 NOTE — PROGRESS NOTES
"Subjective   Reason for Visit: Shaina Iverson is an 84 y.o. female here for a Medicare Wellness visit.     Past Medical, Surgical, and Family History reviewed and updated in chart.    Reviewed all medications by prescribing practitioner or clinical pharmacist (such as prescriptions, OTCs, herbal therapies and supplements) and documented in the medical record.    Chief Complaint   Patient presents with    Medicare Annual Wellness Visit Subsequent       HPI  Here for medicare annual visit  Has CHF and A-fib, compliant with metoprolol and torsemide. Been years since she saw cardiology, will schedule appt  As well, been having dysuria x few days, no urgency, no fever, no suprapubic pain. Admits she does not drink enough water.  Asthma under control. Has interstitial lung disease following with pulmonology. Up to date on CT scan.   Has stage 3 CKD, following with renal    Patient Care Team:  Steven Hdz MD as PCP - General     Review of Systems  General: no fever  Eyes: no blurry vision  ENT: no sore throat, no ear pain  Resp: no cough, sob or wheezing  Cardio: no chest pain, no palpitations  Abd: no nausea/vomiting  : see HPI    Objective   Vitals:  /62   Pulse 78   Temp 36.4 °C (97.5 °F)   Ht 1.626 m (5' 4\")   Wt 64 kg (141 lb)   SpO2 97%   BMI 24.20 kg/m²       Physical Exam  Gen: NAD, alert  Head: normocephalic/atraumatic  Eyes: conjunctivae normal  Ears: canals clear bilaterally, TM normal   Nose: external nose normal   Oropharynx: clear   Resp: Clear to auscultation  CVS: Regular rate and rhythm  Abdomen: soft, NT, ND  Ext: no edema, NT of lower extremities     Assessment & Plan  Medicare annual wellness visit, subsequent         Persistent atrial fibrillation (Multi)  Continue follow up with cardiology       Chronic combined systolic and diastolic heart failure  Continue follow up with cardiology       Stage 3b chronic kidney disease (Multi)  stable       ILD (interstitial lung " disease) (Multi)  Continue follow up with pulmonology       Mild persistent asthma without complication (HHS-HCC)  Continue follow up with pulmonology       Acute UTI    Orders:    Urinalysis with Reflex Microscopic    Urine Culture    cephalexin (Keflex) 500 mg capsule; Take 1 capsule (500 mg) by mouth 2 times a day for 7 days.    Borderline hyperglycemia    Orders:    POCT glycosylated hemoglobin (Hb A1C) manually resulted

## 2025-03-20 DIAGNOSIS — I35.9 AORTIC VALVE DISORDER: ICD-10-CM

## 2025-03-20 DIAGNOSIS — I48.19 PERSISTENT ATRIAL FIBRILLATION (MULTI): Primary | ICD-10-CM

## 2025-03-20 DIAGNOSIS — Z95.3 S/P AORTIC VALVE REPLACEMENT WITH BIOPROSTHETIC VALVE: ICD-10-CM

## 2025-03-20 LAB
APPEARANCE UR: ABNORMAL
BACTERIA #/AREA URNS HPF: ABNORMAL /HPF
BILIRUB UR QL STRIP: NEGATIVE
COLOR UR: YELLOW
GLUCOSE UR QL STRIP: NEGATIVE
HGB UR QL STRIP: ABNORMAL
HYALINE CASTS #/AREA URNS LPF: ABNORMAL /LPF
KETONES UR QL STRIP: NEGATIVE
LEUKOCYTE ESTERASE UR QL STRIP: ABNORMAL
NITRITE UR QL STRIP: NEGATIVE
PH UR STRIP: ABNORMAL [PH] (ref 5–8)
PROT UR QL STRIP: ABNORMAL
RBC #/AREA URNS HPF: ABNORMAL /HPF
SERVICE CMNT-IMP: ABNORMAL
SP GR UR STRIP: 1.01 (ref 1–1.03)
SQUAMOUS #/AREA URNS HPF: ABNORMAL /HPF
TRI-PHOS CRY #/AREA URNS HPF: ABNORMAL /HPF
WBC #/AREA URNS HPF: ABNORMAL /HPF

## 2025-03-21 DIAGNOSIS — N39.0 ACUTE UTI: Primary | ICD-10-CM

## 2025-03-21 LAB — BACTERIA UR CULT: ABNORMAL

## 2025-03-21 RX ORDER — NITROFURANTOIN 25; 75 MG/1; MG/1
100 CAPSULE ORAL 2 TIMES DAILY
Qty: 14 CAPSULE | Refills: 0 | Status: SHIPPED | OUTPATIENT
Start: 2025-03-21 | End: 2025-03-28

## 2025-03-24 DIAGNOSIS — I49.5 SICK SINUS SYNDROME DUE TO SA NODE DYSFUNCTION (MULTI): ICD-10-CM

## 2025-03-24 DIAGNOSIS — Z95.0 PACEMAKER: Primary | ICD-10-CM

## 2025-03-26 ENCOUNTER — HOSPITAL ENCOUNTER (OUTPATIENT)
Dept: CARDIOLOGY | Facility: HOSPITAL | Age: 85
Discharge: HOME | End: 2025-03-26
Payer: MEDICARE

## 2025-03-26 DIAGNOSIS — I48.19 PERSISTENT ATRIAL FIBRILLATION (MULTI): ICD-10-CM

## 2025-03-26 DIAGNOSIS — E03.9 HYPOTHYROIDISM, UNSPECIFIED TYPE: ICD-10-CM

## 2025-03-26 DIAGNOSIS — I35.9 AORTIC VALVE DISORDER: ICD-10-CM

## 2025-03-26 DIAGNOSIS — Z95.3 S/P AORTIC VALVE REPLACEMENT WITH BIOPROSTHETIC VALVE: ICD-10-CM

## 2025-03-26 PROCEDURE — 93280 PM DEVICE PROGR EVAL DUAL: CPT | Performed by: NURSE PRACTITIONER

## 2025-03-26 PROCEDURE — 93280 PM DEVICE PROGR EVAL DUAL: CPT

## 2025-03-26 RX ORDER — LEVOTHYROXINE SODIUM 88 UG/1
TABLET ORAL
Qty: 90 TABLET | Refills: 0 | Status: SHIPPED | OUTPATIENT
Start: 2025-03-26

## 2025-04-10 DIAGNOSIS — D50.9 IRON DEFICIENCY ANEMIA, UNSPECIFIED IRON DEFICIENCY ANEMIA TYPE: ICD-10-CM

## 2025-04-10 RX ORDER — LANOLIN ALCOHOL/MO/W.PET/CERES
1 CREAM (GRAM) TOPICAL DAILY
Qty: 90 TABLET | Refills: 3 | Status: SHIPPED | OUTPATIENT
Start: 2025-04-10

## 2025-04-16 DIAGNOSIS — I10 ESSENTIAL HYPERTENSION: ICD-10-CM

## 2025-04-17 RX ORDER — VALSARTAN 40 MG/1
40 TABLET ORAL DAILY
Qty: 90 TABLET | Refills: 0 | Status: SHIPPED | OUTPATIENT
Start: 2025-04-17

## 2025-04-23 DIAGNOSIS — K20.90 ESOPHAGITIS: ICD-10-CM

## 2025-04-23 RX ORDER — PANTOPRAZOLE SODIUM 40 MG/1
40 TABLET, DELAYED RELEASE ORAL 2 TIMES DAILY
Qty: 180 TABLET | Refills: 1 | Status: SHIPPED | OUTPATIENT
Start: 2025-04-23

## 2025-04-24 DIAGNOSIS — I10 BENIGN ESSENTIAL HTN: ICD-10-CM

## 2025-04-26 RX ORDER — SPIRONOLACTONE 25 MG/1
25 TABLET ORAL DAILY
Qty: 90 TABLET | Refills: 0 | Status: SHIPPED | OUTPATIENT
Start: 2025-04-26

## 2025-05-13 ENCOUNTER — HOSPITAL ENCOUNTER (OUTPATIENT)
Dept: CARDIOLOGY | Facility: CLINIC | Age: 85
Discharge: HOME | End: 2025-05-13
Payer: MEDICARE

## 2025-05-13 DIAGNOSIS — I49.5 SICK SINUS SYNDROME (MULTI): ICD-10-CM

## 2025-05-13 DIAGNOSIS — Z95.0 PRESENCE OF CARDIAC PACEMAKER: ICD-10-CM

## 2025-05-20 ENCOUNTER — APPOINTMENT (OUTPATIENT)
Dept: CARDIOLOGY | Facility: CLINIC | Age: 85
End: 2025-05-20
Payer: MEDICARE

## 2025-06-02 ENCOUNTER — OFFICE VISIT (OUTPATIENT)
Dept: CARDIOLOGY | Facility: HOSPITAL | Age: 85
End: 2025-06-02
Payer: MEDICARE

## 2025-06-02 VITALS
WEIGHT: 143.52 LBS | SYSTOLIC BLOOD PRESSURE: 152 MMHG | OXYGEN SATURATION: 96 % | DIASTOLIC BLOOD PRESSURE: 74 MMHG | HEART RATE: 71 BPM | BODY MASS INDEX: 24.64 KG/M2

## 2025-06-02 DIAGNOSIS — I48.19 PERSISTENT ATRIAL FIBRILLATION (MULTI): ICD-10-CM

## 2025-06-02 DIAGNOSIS — I10 ESSENTIAL HYPERTENSION: ICD-10-CM

## 2025-06-02 DIAGNOSIS — I48.91 ATRIAL FIBRILLATION, UNSPECIFIED TYPE (MULTI): ICD-10-CM

## 2025-06-02 DIAGNOSIS — I10 BENIGN ESSENTIAL HTN: ICD-10-CM

## 2025-06-02 LAB
ATRIAL RATE: 300 BPM
Q ONSET: 206 MS
QRS COUNT: 12 BEATS
QRS DURATION: 164 MS
QT INTERVAL: 496 MS
QTC CALCULATION(BAZETT): 553 MS
QTC FREDERICIA: 534 MS
R AXIS: -66 DEGREES
T AXIS: 119 DEGREES
T OFFSET: 454 MS
VENTRICULAR RATE: 75 BPM

## 2025-06-02 PROCEDURE — 3078F DIAST BP <80 MM HG: CPT | Performed by: PHYSICIAN ASSISTANT

## 2025-06-02 PROCEDURE — 3077F SYST BP >= 140 MM HG: CPT | Performed by: PHYSICIAN ASSISTANT

## 2025-06-02 PROCEDURE — 99214 OFFICE O/P EST MOD 30 MIN: CPT | Performed by: PHYSICIAN ASSISTANT

## 2025-06-02 PROCEDURE — 93005 ELECTROCARDIOGRAM TRACING: CPT | Performed by: PHYSICIAN ASSISTANT

## 2025-06-02 PROCEDURE — 1036F TOBACCO NON-USER: CPT | Performed by: PHYSICIAN ASSISTANT

## 2025-06-02 PROCEDURE — 1159F MED LIST DOCD IN RCRD: CPT | Performed by: PHYSICIAN ASSISTANT

## 2025-06-02 RX ORDER — SPIRONOLACTONE 25 MG/1
12.5 TABLET ORAL DAILY
Qty: 90 TABLET | Refills: 0 | Status: SHIPPED | OUTPATIENT
Start: 2025-06-02

## 2025-06-02 RX ORDER — METOPROLOL SUCCINATE 25 MG/1
25 TABLET, EXTENDED RELEASE ORAL NIGHTLY
Qty: 90 TABLET | Refills: 1 | Status: SHIPPED | OUTPATIENT
Start: 2025-06-02

## 2025-06-02 RX ORDER — VALSARTAN 40 MG/1
40 TABLET ORAL DAILY
Qty: 90 TABLET | Refills: 0 | Status: SHIPPED | OUTPATIENT
Start: 2025-06-02

## 2025-06-02 RX ORDER — TORSEMIDE 20 MG/1
20 TABLET ORAL DAILY
Qty: 90 TABLET | Refills: 3 | Status: SHIPPED | OUTPATIENT
Start: 2025-06-02 | End: 2026-06-02

## 2025-06-02 RX ORDER — CLOPIDOGREL BISULFATE 75 MG/1
75 TABLET ORAL DAILY
Qty: 90 TABLET | Refills: 3 | Status: SHIPPED | OUTPATIENT
Start: 2025-06-02 | End: 2026-06-02

## 2025-06-02 NOTE — PROGRESS NOTES
Chief Complaint:   No chief complaint on file.     History Of Present Illness:    Shaina Iverson is a 84 y.o. female presenting for follow up. Patient is doing well overall. Denies any chest pain, chest pressure, palpitations, dizziness, cough, shortness of breath, orthopnea, edema.  Activity limited d/t leg discomfort, uses a wheelchair for long distances.   No issues w/ medications. Does not check blood pressure at home.      Last Recorded Vitals:  Vitals:    06/02/25 1520   BP: 152/74   Pulse: 71   SpO2: 96%   Weight: 65.1 kg (143 lb 8.3 oz)       Past Medical History:  She has a past medical history of Aneurysm of the ascending aorta, without rupture (01/28/2020), Atherosclerotic heart disease of native coronary artery with other forms of angina pectoris (11/15/2019), Gross hematuria (09/08/2023), H/O ascending aortic replacement (02/01/2023), Heart failure, unspecified (08/26/2019), History of GI bleed (09/08/2023), History of total hip replacement (09/08/2023), Hypertension, Nonrheumatic aortic (valve) insufficiency (06/23/2022), S/P cardiac pacemaker procedure (02/01/2023), S/P coronary artery stent placement (02/01/2023), Severe aortic regurgitation (02/01/2023), Sinus node dysfunction (Multi) (02/01/2023), 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    Past Surgical History:  She has a past surgical history that includes Other surgical history (09/21/2017); Bladder surgery (04/06/2017); Hysterectomy (04/06/2017); Other surgical history (04/06/2017); Cardiac pacemaker placement (04/06/2017); and Other surgical history (03/13/2019).      Social History:  She reports that she has never smoked. She has never used smokeless tobacco. She reports that she does not drink alcohol and does not use drugs.    Family History:  Family History[1]     Allergies:  Farxiga  [dapagliflozin], Iodides, Iodinated contrast media, Ketorolac, Lisinopril, Nsaids (non-steroidal anti-inflammatory drug), and Shellfish containing products    Outpatient Medications:  Current Outpatient Medications   Medication Instructions    albuterol (ProAir HFA) 90 mcg/actuation inhaler 2 puffs, inhalation, Every 4 hours PRN    allopurinol (ZYLOPRIM) 200 mg, oral, Daily    ammonium lactate (Lac-Hydrin) 12 % lotion Apply thin layer at least 2-3 times a day as needed for dry skin    apixaban (ELIQUIS) 2.5 mg, oral, 2 times daily    clopidogrel (PLAVIX) 75 mg, oral, Daily    FeroSuL 325 mg, oral, 2 times daily    fluticasone furoate-vilanteroL (Breo Ellipta) 100-25 mcg/dose inhaler 1 puff, inhalation, Daily, Rinse mouth with water after use to reduce aftertaste and incidence of candidiasis. Do not swallow.    gabapentin (NEURONTIN) 300 mg, oral, 2 times daily    levothyroxine (Synthroid, Levoxyl) 88 mcg tablet TAKE ONE TABLET BY MOUTH EVERY DAY BEFORE BREAKFAST ON AN EMPTY STOMACH.    magnesium oxide (MAG-OX) 400 mg, oral, Daily    metoprolol succinate XL (TOPROL-XL) 25 mg, oral, Nightly    pantoprazole (PROTONIX) 40 mg, oral, 2 times daily    spironolactone (ALDACTONE) 25 mg, oral, Daily    tiZANidine (ZANAFLEX) 2 mg, oral, Every 12 hours PRN    torsemide (DEMADEX) 20 mg, oral, Daily    triamcinolone (Kenalog) 0.1 % cream Apply to affected area 1-2 times daily as needed for rash . Avoid face and groin.    valsartan (DIOVAN) 40 mg, oral, Daily       Physical Exam:  Physical Exam  Constitutional:       General: She is not in acute distress.  HENT:      Head: Normocephalic.      Nose: Nose normal.      Mouth/Throat:      Mouth: Mucous membranes are moist.   Eyes:      Conjunctiva/sclera: Conjunctivae normal.   Neck:      Vascular: No carotid bruit.      Comments: No JVD  Cardiovascular:      Rate and Rhythm: Normal rate and regular rhythm.      Pulses: Normal pulses.      Heart sounds: Normal heart sounds. No murmur  heard.  Pulmonary:      Effort: Pulmonary effort is normal. No respiratory distress.      Breath sounds: Normal breath sounds.   Abdominal:      General: Abdomen is flat.   Musculoskeletal:         General: No swelling.      Cervical back: Neck supple.      Right lower leg: No edema.      Left lower leg: No edema.   Skin:     General: Skin is warm.   Neurological:      General: No focal deficit present.      Mental Status: She is alert. Mental status is at baseline.   Psychiatric:         Mood and Affect: Mood normal.         Behavior: Behavior normal.         Last Labs:  CBC -  Lab Results   Component Value Date    WBC 5.6 03/10/2025    HGB 13.7 03/10/2025    HCT 41.2 03/10/2025    MCV 95.6 03/10/2025     (L) 03/10/2025       CMP -  Lab Results   Component Value Date    CALCIUM 9.7 03/10/2025    PHOS 3.2 03/10/2025    PROT 7.4 10/08/2023    ALBUMIN 4.4 03/10/2025    AST 16 10/08/2023    ALT 9 10/08/2023    ALKPHOS 129 10/08/2023    BILITOT 1.0 10/08/2023       LIPID PANEL -   Lab Results   Component Value Date    CHOL 179 09/16/2024    TRIG 244 (H) 09/16/2024    HDL 35.4 09/16/2024    CHHDL 5.1 09/16/2024    LDLF 22 07/09/2020    VLDL 49 (H) 09/16/2024    NHDL 144 09/16/2024       RENAL FUNCTION PANEL -   Lab Results   Component Value Date    GLUCOSE 87 03/10/2025     03/10/2025    K 3.8 03/10/2025     03/10/2025    CO2 25 03/10/2025    ANIONGAP 16 09/30/2024    BUN 30 (H) 03/10/2025    CREATININE 1.23 (H) 03/10/2025    CALCIUM 9.7 03/10/2025    PHOS 3.2 03/10/2025    ALBUMIN 4.4 03/10/2025        Lab Results   Component Value Date     (H) 05/20/2022    HGBA1C 5.4 03/18/2025       Last Cardiology Tests:  ECG:  EKG 06/02/2025 15:23 Afib, 75 bpm. RBBB, LAD    Echo:  Transthoracic Echo 11/22/2021  CONCLUSIONS:   1. The left ventricular systolic function is mildly to moderately decreased with a 40-45% estimated ejection fraction.   2. There is global hypokinesis of the left ventricle with  minor regional variations.   3. Spectral Doppler shows an abnormal pattern of left ventricular diastolic filling.   4. The left atrium is severely dilated.   5. There is a stentless aortic bioprosthesis, functioning normally.   6. Poorly visualized anatomical structures due to suboptimal image quality.    Transthoracic Echo 09/05/2020  CONCLUSIONS:   1. The left ventricular systolic function is mildly decreased with a 40-45% estimated ejection fraction.   2. The left atrium is severely dilated.   3. RVSP within normal limits.   4. There is a bioprosthetic aortic valve.    Transthoracic Echo 06/11/2019  CONCLUSIONS:   1. The left ventricular systolic function is mildly decreased with a 40-45% estimated ejection fraction.   2. Abnormal septal motion consistent with RV pacemaker.   3. The left atrium is severely dilated.   4. The right atrium is moderately dilated.   5. Moderately elevated right ventricular systolic pressure.   6. There is a bioprosthetic aortic valve.    Intraoperative DORYS 02/07/2019  CONCLUSIONS:   1. The left ventricular systolic function is moderately decreased with a 40% estimated ejection fraction.   2. Spectral Doppler shows an impaired relaxation pattern of left ventricular diastolic filling.   3. The left atrium is severely dilated.   4. There is severe aortic valve regurgitation.   5. Aneurysm of the ascending aorta.   6. No left atrial thrombus.   7. There is no evidence of a patent foramen ovale.   8. There is moderate dilatation of the aortic arch.   9. There is severe dilatation of the ascending aorta.    Transesophageal Echo 09/13/2018  CONCLUSIONS:   1. The left ventricular systolic function is moderately decreased with a 35-40% estimated ejection fraction.   2. The left atrium is moderate to severely dilated.   3. There is moderate to severe aortic valve regurgitation.    Transthoracic Echocardiogram 06/20/2018  CONCLUSIONS:   1. The left ventricular systolic function is mildly to  moderately decreased with a 35-40% estimated ejection fraction.   2. Abnormal septal motion consistent with RV pacemaker.   3. There is global hypokinesis of the left ventricle with minor regional variations.   4. Spectral Doppler shows a pseudonormal pattern of left ventricular diastolic filling.   5. There is severe eccentric left ventricular hypertrophy.   6. The left atrium is severely dilated.   7. Trileaflet AV with calicified leaflet tips with moderate somewhat posteriorly directed AI, which originated centrally, in part due to the dilated ascending aorta.   8. There is moderate aortic valve regurgitation.   9. Moderately dilated ascending aorta ( mid 4.6 cm).  10. Compared to prior echo on 05/09/2017, LV function is unchanged. ( Note there was an echo done 6/22/2017 in [place] with the LVEF reported as 50% and the LV appearing more dynamic, however the apical images were quite foreshortened causing the overcall of the LVEF. Also the ascending aorta appears to be more dilated, however possibly seen more distal on today's exam. The AI appears similar to echo from 5/9/2017.    Transthoracic Echocardiogram 05/09/2017  CONCLUSIONS:   1. The left ventricular systolic function is moderately decreased with a 30-35% estimated ejection fraction.   2. There is global hypokinesis of the left ventricle with minor regional variations.   3. Abnormal septal motion consistent with RV pacemaker.   4. There is moderate concentric left ventricular hypertrophy.   5. The left atrium is moderately dilated.   6. The right atrium is mild to moderately dilated.   7. There is moderate aortic valve regurgitation.   8. Moderate mitral valve regurgitation.   9. There is mild to moderate tricuspid regurgitation.    Transthoracic Echocardiogram 04/20/2017  CONCLUSIONS:   1. The left ventricular systolic function is mildly decreased with a 45% estimated ejection fraction.   2. Abnormal septal motion consistent with RV pacemaker.   3. There  "is mild hypokinesis of the anteroseptal wall.   4. There is moderate concentric left ventricular hypertrophy.   5. The left atrium is moderate to severely dilated.   6. Moderate mitral valve regurgitation.   7. RVSP within normal limits.   8. Mild aortic valve stenosis.   9. The aortic valve appears tricuspid with restriction.  10. There is mild to moderate aortic valve regurgitation.  11. The estimated PASP is 30 mmHg.  12. There is plaque visualized in the ascending aorta.    Ejection Fractions:  No results found for: \"EF\"    Cath:  [facility] 08/14/2018  CONCLUSIONS:   1. The mid circumflex coronary artery showed 80% stenosis s/p Xience Mckayla 3.0 x 8 mm BLAYNE post-dilated with a Euphora 3.5 mm x 6 mm NC balloon.   2. The proximal to mid right coronary artery showed 99% stenosis s/p Xience Mckayla 3.5 x 18 mm BLAYNE post-dilated with a Euphora 3.50 x 12 mm NC balloon.   3. Patient loaded with Prasugrel in the cath lab. Will continue Prasugrel for at least one year.    [facility] 05/17/2017  CONCLUSIONS:   1. Succesful PCI with single BLAYNE of the RCA.     Stress Test:  Nuclear Stress Test 09/21/2022  IMPRESSION:  1. Negative myocardial perfusion study without evidence of inducible  myocardial ischemia or prior infarction.  2. The left ventricle is normal in size.  3. Normal LV wall motion with a stress LV EF estimated at 53%.    Nuclear Stress Test 04/20/2017  Summary:   1. No clinical evidence for ischemia at a maximal infusion.   2. Non-diagnostic stress test based on ECG.   3. Nuclear image results are reported separately.    Cardiac Imaging:  CT Chest High Resolution 09/19/2014  IMPRESSION:  1. Stable findings of nonspecific linear parenchymal opacities and  subpleural reticulation involving the lower lobes and right middle  lobe without significant underlying fibrosis to suggest interstitial  lung disease.  2. Stable findings diffuse air trapping on expiratory phase,  suggestive of small airways disease. Additional stable " findings of  bronchiectasis involving the right middle lobe and  left-greater-than-right lower lobes.  3. Stable bilateral pulmonary nodules as described. No new or  enlarging nodules.  4. Severe cardiomegaly with multichamber involvement.  5. Severe coronary artery calcifications with multiple coronary  artery stents.  6. Dilated main pulmonary artery as can be seen with pulmonary  hypertension.  7. Bilateral glenohumeral joint osteoarthritis and interval  development of subchondral collapse involving the left humeral head.  Correlate with patient's symptoms.      Lab review: I have Chemistry CMP:   Lab Results   Component Value Date    ALBUMIN 4.4 03/10/2025    CALCIUM 9.7 03/10/2025    CO2 25 03/10/2025    CREATININE 1.23 (H) 03/10/2025    GLUCOSE 87 03/10/2025    BILITOT 1.0 10/08/2023    PROT 7.4 10/08/2023    ALT 9 10/08/2023    AST 16 10/08/2023    ALKPHOS 129 10/08/2023   , Chemistry BMP   Lab Results   Component Value Date    GLUCOSE 87 03/10/2025    CALCIUM 9.7 03/10/2025    CO2 25 03/10/2025    CREATININE 1.23 (H) 03/10/2025   , CBC:  Lab Results   Component Value Date    WBC 5.6 03/10/2025    RBC 4.31 03/10/2025    HGB 13.7 03/10/2025    HCT 41.2 03/10/2025    MCV 95.6 03/10/2025    MCH 31.8 03/10/2025    MCHC 33.3 03/10/2025    RDW 14.0 03/10/2025    MPV 9.5 03/10/2025    NRBC 0.0 09/16/2024   , Coags:   Lab Results   Component Value Date    APTT 33 06/14/2023    FIBRINOGEN 227 02/07/2019    INR 1.2 (H) 06/14/2023   , and Lipids:   Lab Results   Component Value Date    CHOL 179 09/16/2024    HDL 35.4 09/16/2024    LDLCALC 95 09/16/2024    TRIG 244 (H) 09/16/2024     Diagnostic review: I have personally reviewed the result(s) of the EKG and Echocardiogram .   Imaging review: I have  personally reviewed the result(s) [facility].     Assessment/Plan   Problem List Items Addressed This Visit           ICD-10-CM    Persistent atrial fibrillation (Multi) I48.19    Relevant Medications    clopidogrel (Plavix) 75 mg  tablet    apixaban (Eliquis) 2.5 mg tablet    metoprolol succinate XL (Toprol-XL) 25 mg 24 hr tablet    Other Relevant Orders    ECG 12 lead (Clinic Performed) (Completed)    Transthoracic echo (TTE) complete    Benign essential HTN I10    Relevant Medications    metoprolol succinate XL (Toprol-XL) 25 mg 24 hr tablet    spironolactone (Aldactone) 25 mg tablet    Essential hypertension I10    Relevant Medications    torsemide (Demadex) 20 mg tablet    valsartan (Diovan) 40 mg tablet     Other Visit Diagnoses         Codes      Atrial fibrillation, unspecified type (Multi)     I48.91    Relevant Medications    clopidogrel (Plavix) 75 mg tablet    apixaban (Eliquis) 2.5 mg tablet    metoprolol succinate XL (Toprol-XL) 25 mg 24 hr tablet          Shaina Iverson is an 85 yo female with a PMH of Severe AS s/p AoV, Aortic root replacement (#27mm Freestyle Bioprosthetic Ascending Aneurysm repair w/ 28mm Gelweave Graft)- [facility] 2/2019, HFrEF (EF 30-35% --> 40-45% post valve replacement), ASCVD s/p PCI to Lcx, mid-RCA (July 2020), Hypothyroidism, COPD (on O2), Chronic Afib (on Eliquis) s/p PPM who presents to reestablish care.     CAD s/p PCI to Lcx, Mid RCA 7/2020  Severe AS s/p AVR, Ascending aneurysm repair (2/2019)  Chronic systolic heart failure w/ recovered EF, euvolemic on exam  HTN  HLD    Repeat echocardiogram to evaluate valve, structural and functional evaluation.   Rate controlled Afib.   Continue with Eliquis for primary prevention of stroke in setting of Afib.   C/w GDMT for systolic heart failure including Toprol, Robert, Valsartan.   C/w GDMT for CAD including Eliquis, Plavix.         Jennifer Dowell PA-C         [1]   Family History  Problem Relation Name Age of Onset    Diabetes Mother      Hypertension Brother      Other (Cardiac Disorder) Brother      Liver cancer Brother      Diabetes Mother's Sister      Diabetes Mother's Brother

## 2025-06-04 ENCOUNTER — TELEPHONE (OUTPATIENT)
Dept: PRIMARY CARE | Facility: CLINIC | Age: 85
End: 2025-06-04
Payer: MEDICARE

## 2025-06-04 DIAGNOSIS — N39.0 ACUTE UTI: Primary | ICD-10-CM

## 2025-06-04 RX ORDER — NITROFURANTOIN 25; 75 MG/1; MG/1
100 CAPSULE ORAL 2 TIMES DAILY
Qty: 14 CAPSULE | Refills: 0 | Status: SHIPPED | OUTPATIENT
Start: 2025-06-04 | End: 2025-06-11

## 2025-06-04 NOTE — TELEPHONE ENCOUNTER
Last couple days has been having UTI symptoms. Odor, frequency, urgency. Asking for an abx and lab culture. Please advise.

## 2025-06-07 LAB — BACTERIA UR CULT: NORMAL

## 2025-06-11 ENCOUNTER — HOSPITAL ENCOUNTER (OUTPATIENT)
Dept: CARDIOLOGY | Facility: HOSPITAL | Age: 85
Discharge: HOME | End: 2025-06-11
Payer: MEDICARE

## 2025-06-11 DIAGNOSIS — I48.19 PERSISTENT ATRIAL FIBRILLATION (MULTI): ICD-10-CM

## 2025-06-11 LAB
AORTIC VALVE MEAN GRADIENT: 1 MMHG
AORTIC VALVE PEAK VELOCITY: 0.86 M/S
AV PEAK GRADIENT: 3 MMHG
AVA (PEAK VEL): 3.08 CM2
AVA (VTI): 3.21 CM2
EJECTION FRACTION APICAL 4 CHAMBER: 59.8
EJECTION FRACTION: 53 %
LEFT ATRIUM VOLUME AREA LENGTH INDEX BSA: 72.9 ML/M2
LEFT VENTRICLE INTERNAL DIMENSION DIASTOLE: 4.27 CM (ref 3.5–6)
LEFT VENTRICULAR OUTFLOW TRACT DIAMETER: 2.2 CM
RIGHT VENTRICLE FREE WALL PEAK S': 9.41 CM/S
RIGHT VENTRICLE PEAK SYSTOLIC PRESSURE: 42 MMHG
TRICUSPID ANNULAR PLANE SYSTOLIC EXCURSION: 1.2 CM

## 2025-06-11 PROCEDURE — 93306 TTE W/DOPPLER COMPLETE: CPT | Performed by: INTERNAL MEDICINE

## 2025-06-11 PROCEDURE — 93306 TTE W/DOPPLER COMPLETE: CPT

## 2025-06-17 ENCOUNTER — TELEPHONE (OUTPATIENT)
Dept: CARDIOLOGY | Facility: HOSPITAL | Age: 85
End: 2025-06-17
Payer: MEDICARE

## 2025-06-17 NOTE — TELEPHONE ENCOUNTER
"----- Message from Jennifer Dowell sent at 6/17/2025  7:41 AM EDT -----  Hi can we please schedule this patient for a 6 month follow up instead of 1 year. I'd like to see her twice a year given her most recent echocardiogram.   Pt Message \"Hello I have reviewed your echocardiogram. Your overall heart pump function is normal however the right side of your heart appears weaker than the last echocardiogram in 2021. For that   reason I'd like to keep a closer eye on you and see you twice a year instead of yearly, or sooner if needed\"  ----- Message -----  From: Interface, [facility] - Cardiology Results In  Sent: 6/11/2025   7:29 PM EDT  To: Jennifer Dowell PA-C    "

## 2025-06-30 ENCOUNTER — HOSPITAL ENCOUNTER (OUTPATIENT)
Dept: CARDIOLOGY | Facility: CLINIC | Age: 85
Discharge: HOME | End: 2025-06-30
Payer: MEDICARE

## 2025-06-30 DIAGNOSIS — Z95.0 PRESENCE OF CARDIAC PACEMAKER: ICD-10-CM

## 2025-06-30 DIAGNOSIS — I49.5 SICK SINUS SYNDROME (MULTI): ICD-10-CM

## 2025-06-30 DIAGNOSIS — E03.9 HYPOTHYROIDISM, UNSPECIFIED TYPE: ICD-10-CM

## 2025-06-30 PROCEDURE — 93296 REM INTERROG EVL PM/IDS: CPT

## 2025-07-01 RX ORDER — LEVOTHYROXINE SODIUM 88 UG/1
TABLET ORAL
Qty: 90 TABLET | Refills: 3 | Status: SHIPPED | OUTPATIENT
Start: 2025-07-01

## 2025-07-10 ENCOUNTER — TELEPHONE (OUTPATIENT)
Dept: PRIMARY CARE | Facility: CLINIC | Age: 85
End: 2025-07-10
Payer: MEDICARE

## 2025-07-10 DIAGNOSIS — N39.0 ACUTE UTI: ICD-10-CM

## 2025-07-10 DIAGNOSIS — R39.9 URINARY SYMPTOM OR SIGN: Primary | ICD-10-CM

## 2025-07-10 RX ORDER — NITROFURANTOIN 25; 75 MG/1; MG/1
100 CAPSULE ORAL 2 TIMES DAILY
Qty: 14 CAPSULE | Refills: 0 | Status: SHIPPED | OUTPATIENT
Start: 2025-07-10 | End: 2025-07-17

## 2025-07-10 NOTE — TELEPHONE ENCOUNTER
Shaina has UTI symptoms, will you put in an order for ua at lab and antibiotics?  Lei (son) will take her Friday to give urine sample if order is in.

## 2025-07-13 LAB
APPEARANCE UR: CLEAR
BACTERIA #/AREA URNS HPF: ABNORMAL /HPF
BACTERIA UR CULT: ABNORMAL
BACTERIA UR CULT: ABNORMAL
BILIRUB UR QL STRIP: NEGATIVE
COLOR UR: YELLOW
GLUCOSE UR QL STRIP: NEGATIVE
HGB UR QL STRIP: ABNORMAL
HYALINE CASTS #/AREA URNS LPF: ABNORMAL /LPF
KETONES UR QL STRIP: NEGATIVE
LEUKOCYTE ESTERASE UR QL STRIP: ABNORMAL
NITRITE UR QL STRIP: NEGATIVE
PH UR STRIP: 7 [PH] (ref 5–8)
PROT UR QL STRIP: ABNORMAL
RBC #/AREA URNS HPF: ABNORMAL /HPF
SERVICE CMNT-IMP: ABNORMAL
SP GR UR STRIP: 1.01 (ref 1–1.03)
SQUAMOUS #/AREA URNS HPF: ABNORMAL /HPF
WBC #/AREA URNS HPF: ABNORMAL /HPF

## 2025-08-10 DIAGNOSIS — D50.0 IRON DEFICIENCY ANEMIA DUE TO CHRONIC BLOOD LOSS: ICD-10-CM

## 2025-08-11 ENCOUNTER — HOSPITAL ENCOUNTER (OUTPATIENT)
Dept: CARDIOLOGY | Facility: CLINIC | Age: 85
Discharge: HOME | End: 2025-08-11
Payer: MEDICARE

## 2025-08-11 DIAGNOSIS — I49.5 SICK SINUS SYNDROME DUE TO SA NODE DYSFUNCTION (MULTI): ICD-10-CM

## 2025-08-11 DIAGNOSIS — Z95.0 PACEMAKER: ICD-10-CM

## 2025-08-11 RX ORDER — FERROUS SULFATE 325(65) MG
1 TABLET ORAL 2 TIMES DAILY
Qty: 180 TABLET | Refills: 0 | Status: SHIPPED | OUTPATIENT
Start: 2025-08-11

## 2025-08-14 ENCOUNTER — TELEPHONE (OUTPATIENT)
Dept: PRIMARY CARE | Facility: CLINIC | Age: 85
End: 2025-08-14
Payer: MEDICARE

## 2025-08-14 DIAGNOSIS — N39.0 ACUTE UTI: Primary | ICD-10-CM

## 2025-08-17 LAB
APPEARANCE UR: ABNORMAL
BACTERIA #/AREA URNS HPF: ABNORMAL /HPF
BACTERIA UR CULT: ABNORMAL
BACTERIA UR CULT: ABNORMAL
BILIRUB UR QL STRIP: NEGATIVE
COLOR UR: YELLOW
GLUCOSE UR QL STRIP: NEGATIVE
HGB UR QL STRIP: ABNORMAL
HYALINE CASTS #/AREA URNS LPF: ABNORMAL /LPF
KETONES UR QL STRIP: NEGATIVE
LEUKOCYTE ESTERASE UR QL STRIP: ABNORMAL
NITRITE UR QL STRIP: NEGATIVE
PH UR STRIP: ABNORMAL [PH] (ref 5–8)
PROT UR QL STRIP: ABNORMAL
RBC #/AREA URNS HPF: ABNORMAL /HPF
SERVICE CMNT-IMP: ABNORMAL
SP GR UR STRIP: 1.01 (ref 1–1.03)
SQUAMOUS #/AREA URNS HPF: ABNORMAL /HPF
TRI-PHOS CRY #/AREA URNS HPF: ABNORMAL /HPF
WBC #/AREA URNS HPF: ABNORMAL /HPF

## 2025-08-19 ENCOUNTER — TELEPHONE (OUTPATIENT)
Dept: PRIMARY CARE | Facility: CLINIC | Age: 85
End: 2025-08-19
Payer: MEDICARE

## 2025-08-19 DIAGNOSIS — N39.0 ACUTE UTI: Primary | ICD-10-CM

## 2025-08-19 DIAGNOSIS — B37.9 YEAST INFECTION: ICD-10-CM

## 2025-08-19 RX ORDER — CEPHALEXIN 500 MG/1
500 CAPSULE ORAL 2 TIMES DAILY
Qty: 14 CAPSULE | Refills: 0 | Status: SHIPPED | OUTPATIENT
Start: 2025-08-19 | End: 2025-08-26

## 2025-08-19 RX ORDER — MICONAZOLE NITRATE 2 %
CREAM WITH APPLICATOR VAGINAL
Qty: 45 G | Refills: 0 | Status: SHIPPED | OUTPATIENT
Start: 2025-08-19

## 2025-09-15 ENCOUNTER — APPOINTMENT (OUTPATIENT)
Dept: NEPHROLOGY | Facility: CLINIC | Age: 85
End: 2025-09-15
Payer: MEDICARE

## 2025-09-18 ENCOUNTER — APPOINTMENT (OUTPATIENT)
Dept: PRIMARY CARE | Facility: CLINIC | Age: 85
End: 2025-09-18
Payer: MEDICARE